# Patient Record
Sex: MALE | Race: WHITE | Employment: FULL TIME | URBAN - METROPOLITAN AREA
[De-identification: names, ages, dates, MRNs, and addresses within clinical notes are randomized per-mention and may not be internally consistent; named-entity substitution may affect disease eponyms.]

---

## 2021-10-05 ENCOUNTER — OFFICE VISIT (OUTPATIENT)
Dept: URGENT CARE | Facility: CLINIC | Age: 56
End: 2021-10-05
Payer: COMMERCIAL

## 2021-10-05 ENCOUNTER — APPOINTMENT (OUTPATIENT)
Dept: RADIOLOGY | Facility: CLINIC | Age: 56
End: 2021-10-05
Payer: COMMERCIAL

## 2021-10-05 VITALS
HEIGHT: 72 IN | HEART RATE: 96 BPM | BODY MASS INDEX: 31.02 KG/M2 | TEMPERATURE: 99.7 F | OXYGEN SATURATION: 98 % | WEIGHT: 229 LBS | DIASTOLIC BLOOD PRESSURE: 92 MMHG | RESPIRATION RATE: 22 BRPM | SYSTOLIC BLOOD PRESSURE: 165 MMHG

## 2021-10-05 DIAGNOSIS — J18.9 WALKING PNEUMONIA: Primary | ICD-10-CM

## 2021-10-05 DIAGNOSIS — R05.9 COUGH: ICD-10-CM

## 2021-10-05 PROCEDURE — 99213 OFFICE O/P EST LOW 20 MIN: CPT | Performed by: PHYSICIAN ASSISTANT

## 2021-10-05 PROCEDURE — U0003 INFECTIOUS AGENT DETECTION BY NUCLEIC ACID (DNA OR RNA); SEVERE ACUTE RESPIRATORY SYNDROME CORONAVIRUS 2 (SARS-COV-2) (CORONAVIRUS DISEASE [COVID-19]), AMPLIFIED PROBE TECHNIQUE, MAKING USE OF HIGH THROUGHPUT TECHNOLOGIES AS DESCRIBED BY CMS-2020-01-R: HCPCS | Performed by: PHYSICIAN ASSISTANT

## 2021-10-05 PROCEDURE — 71046 X-RAY EXAM CHEST 2 VIEWS: CPT

## 2021-10-05 PROCEDURE — U0005 INFEC AGEN DETEC AMPLI PROBE: HCPCS | Performed by: PHYSICIAN ASSISTANT

## 2021-10-05 RX ORDER — ATORVASTATIN CALCIUM 40 MG/1
40 TABLET, FILM COATED ORAL DAILY
COMMUNITY

## 2021-10-05 RX ORDER — LISINOPRIL 20 MG/1
20 TABLET ORAL DAILY
COMMUNITY

## 2021-10-05 RX ORDER — DOXYCYCLINE 100 MG/1
100 TABLET ORAL 2 TIMES DAILY
Qty: 14 TABLET | Refills: 0 | Status: SHIPPED | OUTPATIENT
Start: 2021-10-05 | End: 2021-10-12

## 2021-10-05 RX ORDER — OMEPRAZOLE 20 MG/1
20 CAPSULE, DELAYED RELEASE ORAL DAILY
COMMUNITY

## 2021-10-06 LAB — SARS-COV-2 RNA RESP QL NAA+PROBE: NEGATIVE

## 2024-01-14 ENCOUNTER — OFFICE VISIT (OUTPATIENT)
Dept: URGENT CARE | Facility: CLINIC | Age: 59
End: 2024-01-14
Payer: COMMERCIAL

## 2024-01-14 VITALS
DIASTOLIC BLOOD PRESSURE: 84 MMHG | SYSTOLIC BLOOD PRESSURE: 126 MMHG | TEMPERATURE: 97.9 F | RESPIRATION RATE: 18 BRPM | OXYGEN SATURATION: 98 % | HEART RATE: 64 BPM

## 2024-01-14 DIAGNOSIS — R05.1 ACUTE COUGH: ICD-10-CM

## 2024-01-14 DIAGNOSIS — B34.9 VIRAL ILLNESS: Primary | ICD-10-CM

## 2024-01-14 PROBLEM — E11.40 DIABETIC NEUROPATHY ASSOCIATED WITH TYPE 2 DIABETES MELLITUS (HCC): Status: ACTIVE | Noted: 2023-10-15

## 2024-01-14 PROBLEM — K21.9 GASTROESOPHAGEAL REFLUX DISEASE WITHOUT ESOPHAGITIS: Status: ACTIVE | Noted: 2023-10-15

## 2024-01-14 PROBLEM — E11.65 TYPE 2 DIABETES MELLITUS WITH HYPERGLYCEMIA (HCC): Status: ACTIVE | Noted: 2023-10-15

## 2024-01-14 PROBLEM — I10 ESSENTIAL HYPERTENSION: Status: ACTIVE | Noted: 2023-10-15

## 2024-01-14 PROBLEM — I35.0 AORTIC VALVE STENOSIS: Status: ACTIVE | Noted: 2023-10-15

## 2024-01-14 PROBLEM — J96.01 ACUTE RESPIRATORY FAILURE WITH HYPOXIA (HCC): Status: ACTIVE | Noted: 2023-10-15

## 2024-01-14 PROBLEM — N17.9 AKI (ACUTE KIDNEY INJURY) (HCC): Status: ACTIVE | Noted: 2023-10-15

## 2024-01-14 PROBLEM — R79.89 ELEVATED TROPONIN: Status: ACTIVE | Noted: 2020-01-15

## 2024-01-14 PROBLEM — I25.10 CAD (CORONARY ARTERY DISEASE): Status: ACTIVE | Noted: 2023-10-15

## 2024-01-14 PROBLEM — I50.9 ACUTE EXACERBATION OF CHF (CONGESTIVE HEART FAILURE) (HCC): Status: ACTIVE | Noted: 2023-10-15

## 2024-01-14 PROCEDURE — G0382 LEV 3 HOSP TYPE B ED VISIT: HCPCS

## 2024-01-14 PROCEDURE — 87636 SARSCOV2 & INF A&B AMP PRB: CPT

## 2024-01-14 PROCEDURE — 99283 EMERGENCY DEPT VISIT LOW MDM: CPT

## 2024-01-14 RX ORDER — ASPIRIN 81 MG/1
81 TABLET ORAL DAILY
COMMUNITY

## 2024-01-14 RX ORDER — CLOPIDOGREL BISULFATE 75 MG/1
TABLET ORAL
COMMUNITY
Start: 2023-10-26

## 2024-01-14 RX ORDER — AMLODIPINE BESYLATE 10 MG/1
10 TABLET ORAL DAILY
COMMUNITY
Start: 2023-09-01

## 2024-01-14 NOTE — PROGRESS NOTES
[facility]    NAME: Zain Gayle is a 58 y.o. male  : 1965    MRN: [id number]  DATE: 2024  TIME: 9:45 AM    Assessment and Plan   Viral illness [B34.9]  1. Viral illness        2. Acute cough  Cov/Flu - Lab Collect        Symptoms consistent with viral illness. Swabbed for COVID/Flu. Noted medical history -- pt outside of 48 hour range for considering Tamiflu.   Given education on supportive measures for symptoms in discharge instructions.  Follow up with primary care in 3-5 days.  Go to ER if symptoms get worse.     Patient Instructions     --Rest, drink plenty of fluids     --For nasal/sinus congestion, oral pills do not help symptoms. If you do not have hypertension/cardiac conditions -- take nasal sprays such as Afrin (for 3 days use only) or Sudafed (buy at the pharmacy counter). You can also try Flonase, steroid nasal sprays to help.     --For cough, you can take an OTC expectorant such as plain Robitussion or Mucinex. A spoonful of honey at bedtime may also be helpful.    --For sore throat, you can take OTC lozenges, use warm gargles (salt water or apple cider vinegar and honey), herbal teas, or an OTC throat spray (Chloraseptic).    --You can take Tylenol or Motrin/Advil as needed for fever, headache, body aches. Do not take Motrin/Advil if you have a history of heart disease, bleeding ulcers, or if you take blood thinners.     -For cold symptoms with high blood pressure take Coricidin cough/cold.     --You should contact your primary care provider and/or go to the ER if your symptoms are not improved or get worse over the next 7 days. This includes new onset fever, localized ear pain, sinus pain, as well as worsening cough, chest pain, shortness of breath, or significant weakness/fatigue.      Chief Complaint     Chief Complaint   Patient presents with    Flu Symptoms     Pt c/o fever, chills, body aches, cough with head and chest congestion that started 2 days ago          History of Present Illness       Presents with sick symptoms including fever, chills, body aches, cough and head/chest congestion that began 2 days ago. Known sick contacts. He has not taken medications for symptoms. Recently started job and would need a doctors note.         Review of Systems   Review of Systems   Constitutional:  Positive for chills and fever.   HENT:  Positive for congestion. Negative for ear pain and sore throat.    Respiratory:  Positive for cough. Negative for shortness of breath.    Cardiovascular:  Negative for chest pain and palpitations.   Gastrointestinal:  Negative for diarrhea, nausea and vomiting.   Musculoskeletal:  Positive for myalgias.   Skin:  Negative for color change and rash.   Neurological:  Positive for light-headedness. Negative for syncope.   Psychiatric/Behavioral:  Negative for confusion.    All other systems reviewed and are negative.        Current Medications       Current Outpatient Medications:     amLODIPine (NORVASC) 10 mg tablet, Take 10 mg by mouth daily, Disp: , Rfl:     aspirin (ECOTRIN LOW STRENGTH) 81 mg EC tablet, Take 81 mg by mouth daily, Disp: , Rfl:     atorvastatin (LIPITOR) 40 mg tablet, Take 40 mg by mouth daily, Disp: , Rfl:     clopidogrel (PLAVIX) 75 mg tablet, , Disp: , Rfl:     omeprazole (PriLOSEC) 20 mg delayed release capsule, Take 20 mg by mouth daily, Disp: , Rfl:     lisinopril (ZESTRIL) 20 mg tablet, Take 20 mg by mouth daily (Patient not taking: Reported on 1/14/2024), Disp: , Rfl:     pseudoephedrine-acetaminophen (TYLENOL SINUS)  MG TABS, Take 1 tablet by mouth every 4 (four) hours as needed for congestion, Disp: , Rfl:     Current Allergies     Allergies as of 01/14/2024    (No Known Allergies)            The following portions of the patient's history were reviewed and updated as appropriate: allergies, current medications, past family history, past medical history, past social history, past surgical history and problem  list.     Past Medical History:   Diagnosis Date    Allergic     Arthritis     Diabetes mellitus (HCC)     Hypertension     MRSA exposure        Past Surgical History:   Procedure Laterality Date    CARPAL TUNNEL RELEASE      KNEE SURGERY      TOE AMPUTATION         Family History   Problem Relation Age of Onset    Heart disease Mother     Heart disease Father     Cancer Father          Medications have been verified.        Objective   /84   Pulse 64   Temp 97.9 °F (36.6 °C) (Oral)   Resp 18   SpO2 98%        Physical Exam     Physical Exam  Vitals reviewed.   Constitutional:       General: He is not in acute distress.     Appearance: Normal appearance.   HENT:      Right Ear: Tympanic membrane, ear canal and external ear normal.      Left Ear: Tympanic membrane, ear canal and external ear normal.      Nose: Nose normal.      Mouth/Throat:      Mouth: Mucous membranes are moist.      Pharynx: No posterior oropharyngeal erythema.   Eyes:      Conjunctiva/sclera: Conjunctivae normal.   Cardiovascular:      Rate and Rhythm: Normal rate and regular rhythm.      Pulses: Normal pulses.      Heart sounds: Normal heart sounds. No murmur heard.  Pulmonary:      Effort: Pulmonary effort is normal. No respiratory distress.      Breath sounds: Normal breath sounds.   Skin:     General: Skin is warm and dry.   Neurological:      General: No focal deficit present.      Mental Status: He is alert and oriented to person, place, and time.   Psychiatric:         Mood and Affect: Mood normal.         Behavior: Behavior normal.

## 2024-01-14 NOTE — LETTER
January 14, 2024     Patient: Zain Gayle   YOB: 1965   Date of Visit: 1/14/2024       To Whom it May Concern:    Zain Gayle was seen in my clinic on 1/14/2024. He should be excused 1/15/24 and until fever free 24 hours.     If you have any questions or concerns, please don't hesitate to call.         Sincerely,          CHERIE Maldonado        CC: No Recipients

## 2024-01-15 LAB
FLUAV RNA RESP QL NAA+PROBE: NEGATIVE
FLUBV RNA RESP QL NAA+PROBE: NEGATIVE
SARS-COV-2 RNA RESP QL NAA+PROBE: NEGATIVE

## 2024-01-17 NOTE — RESULT ENCOUNTER NOTE
Your COVID/Flu test result was negative. Continue supportive care with over the counter medications. If any additional problems/concerns please follow up with your family doctor or return visit to care now. I hope you feel better soon!

## 2024-01-22 ENCOUNTER — OFFICE VISIT (OUTPATIENT)
Dept: URGENT CARE | Facility: CLINIC | Age: 59
End: 2024-01-22
Payer: COMMERCIAL

## 2024-01-22 VITALS
TEMPERATURE: 99.6 F | OXYGEN SATURATION: 96 % | SYSTOLIC BLOOD PRESSURE: 176 MMHG | DIASTOLIC BLOOD PRESSURE: 86 MMHG | HEART RATE: 75 BPM | RESPIRATION RATE: 22 BRPM

## 2024-01-22 DIAGNOSIS — J20.9 ACUTE BRONCHITIS, UNSPECIFIED ORGANISM: Primary | ICD-10-CM

## 2024-01-22 DIAGNOSIS — R05.1 ACUTE COUGH: ICD-10-CM

## 2024-01-22 PROCEDURE — 99283 EMERGENCY DEPT VISIT LOW MDM: CPT

## 2024-01-22 PROCEDURE — G0382 LEV 3 HOSP TYPE B ED VISIT: HCPCS

## 2024-01-22 RX ORDER — AZITHROMYCIN 250 MG/1
TABLET, FILM COATED ORAL
Qty: 6 TABLET | Refills: 0 | Status: SHIPPED | OUTPATIENT
Start: 2024-01-22 | End: 2024-01-26

## 2024-01-22 RX ORDER — BENZONATATE 200 MG/1
200 CAPSULE ORAL 3 TIMES DAILY PRN
Qty: 20 CAPSULE | Refills: 0 | Status: SHIPPED | OUTPATIENT
Start: 2024-01-22

## 2024-01-22 NOTE — PROGRESS NOTES
[facility]        NAME: Zain Gayle is a 58 y.o. male  : 1965    MRN: [id number]  DATE: 2024  TIME: 2:33 PM    Assessment and Plan   Acute bronchitis, unspecified organism [J20.9]  1. Acute bronchitis, unspecified organism  azithromycin (ZITHROMAX) 250 mg tablet      2. Acute cough  benzonatate (TESSALON) 200 MG capsule        Declined COVID/flu testing.  Work note given.     Patient Instructions     Start azithromycin.  Mucinex over-the-counter for cough and congestion.  Benzonatate as needed for cough.   Tylenol and Motrin over-the-counter for pain and fever.    Follow up with PCP.  Go to the nearest emergency department if you have difficulty breathing, worsening symptoms.     Chief Complaint     Chief Complaint   Patient presents with    Cough     Cough and chest congestion worsening since here last on .  States cough is making abdomen and ribs hurt in addition to coughing fits are making him dizzy         History of Present Illness       The patient presents today with complaints of productive cough for yellow/green sputum, chest congestion x 1 week. Yesterday he missed work due to nausea and diarrhea. He states he will cough to the point of vomiting at times. He was seen here on 23 and tested negative for COVID/flu and diagnosed with a viral URI. He denies fever/chills, SOB, wheezing. He is a former smoker. Is requesting a work note.         Review of Systems   Review of Systems   Constitutional:  Negative for chills and fever.   HENT:  Negative for congestion, ear pain, postnasal drip, rhinorrhea and sore throat.    Respiratory:  Positive for cough. Negative for shortness of breath and wheezing.    Gastrointestinal:  Positive for diarrhea, nausea and vomiting. Negative for abdominal pain.   Musculoskeletal:  Positive for myalgias (related to coughing).   Skin:  Negative for rash.   Neurological:  Positive for dizziness (when coughing).         Current Medications        Current Outpatient Medications:     azithromycin (ZITHROMAX) 250 mg tablet, Take 2 tablets today then 1 tablet daily x 4 days, Disp: 6 tablet, Rfl: 0    benzonatate (TESSALON) 200 MG capsule, Take 1 capsule (200 mg total) by mouth 3 (three) times a day as needed for cough, Disp: 20 capsule, Rfl: 0    amLODIPine (NORVASC) 10 mg tablet, Take 10 mg by mouth daily, Disp: , Rfl:     aspirin (ECOTRIN LOW STRENGTH) 81 mg EC tablet, Take 81 mg by mouth daily, Disp: , Rfl:     atorvastatin (LIPITOR) 40 mg tablet, Take 40 mg by mouth daily, Disp: , Rfl:     clopidogrel (PLAVIX) 75 mg tablet, , Disp: , Rfl:     lisinopril (ZESTRIL) 20 mg tablet, Take 20 mg by mouth daily (Patient not taking: Reported on 1/14/2024), Disp: , Rfl:     omeprazole (PriLOSEC) 20 mg delayed release capsule, Take 20 mg by mouth daily, Disp: , Rfl:     pseudoephedrine-acetaminophen (TYLENOL SINUS)  MG TABS, Take 1 tablet by mouth every 4 (four) hours as needed for congestion, Disp: , Rfl:     Current Allergies     Allergies as of 01/22/2024    (No Known Allergies)            The following portions of the patient's history were reviewed and updated as appropriate: allergies, current medications, past family history, past medical history, past social history, past surgical history and problem list.     Past Medical History:   Diagnosis Date    Allergic     Arthritis     Diabetes mellitus (HCC)     Hypertension     MRSA exposure        Past Surgical History:   Procedure Laterality Date    CARPAL TUNNEL RELEASE      KNEE SURGERY      TOE AMPUTATION         Family History   Problem Relation Age of Onset    Heart disease Mother     Heart disease Father     Cancer Father          Medications have been verified.        Objective   BP (!) 176/86   Pulse 75   Temp 99.6 °F (37.6 °C)   Resp 22   SpO2 96%        Physical Exam     Physical Exam  Vitals and nursing note reviewed.   Constitutional:       General: He is not in acute distress.      Appearance: Normal appearance. He is not ill-appearing.   HENT:      Head: Normocephalic and atraumatic.      Right Ear: Tympanic membrane, ear canal and external ear normal.      Left Ear: Tympanic membrane, ear canal and external ear normal.      Nose: Nose normal. No congestion or rhinorrhea.      Mouth/Throat:      Lips: Pink.      Mouth: Mucous membranes are moist.      Pharynx: No oropharyngeal exudate or posterior oropharyngeal erythema.      Tonsils: No tonsillar exudate.   Eyes:      General: Vision grossly intact.      Extraocular Movements: Extraocular movements intact.      Pupils: Pupils are equal, round, and reactive to light.   Cardiovascular:      Rate and Rhythm: Normal rate and regular rhythm.      Heart sounds: Normal heart sounds. No murmur heard.  Pulmonary:      Effort: Pulmonary effort is normal. No respiratory distress.      Breath sounds: Normal breath sounds. No decreased air movement. No decreased breath sounds, wheezing, rhonchi or rales.   Musculoskeletal:         General: Normal range of motion.      Cervical back: Normal range of motion.   Skin:     General: Skin is warm.      Findings: No rash.   Neurological:      Mental Status: He is alert and oriented to person, place, and time.      Motor: Motor function is intact.      Gait: Gait is intact.   Psychiatric:         Attention and Perception: Attention normal.         Mood and Affect: Mood normal.

## 2024-01-22 NOTE — PATIENT INSTRUCTIONS
Start azithromycin.  Mucinex over-the-counter for cough and congestion.  Benzonatate as needed for cough.   Tylenol and Motrin over-the-counter for pain and fever.    Follow up with PCP.  Go to the nearest emergency department if you have difficulty breathing, worsening symptoms.         Acute Bronchitis   WHAT YOU NEED TO KNOW:   Acute bronchitis is swelling and irritation in your lungs. It is usually caused by a virus and most often happens in the winter. Bronchitis may also be caused by bacteria or by a chemical irritant, such as smoke.  DISCHARGE INSTRUCTIONS:   Return to the emergency department if:   You cough up blood.     Your lips or fingernails turn blue.     You feel like you are not getting enough air when you breathe.     Call your doctor if:   Your symptoms do not go away or get worse, even after treatment.     Your cough does not get better within 4 weeks.     You have questions or concerns about your condition or care.     Medicines:  You may  need any of the following:  Cough suppressants  decrease your urge to cough.      Decongestants  help loosen mucus in your lungs and make it easier to cough up. This can help you breathe easier.     Inhalers  may be given. Your healthcare provider may give you one or more inhalers to help you breathe easier and cough less. An inhaler gives your medicine to open your airways. Ask your healthcare provider to show you how to use your inhaler correctly.          Antibiotics  may be given for up to 5 days if your bronchitis is caused by bacteria.     Acetaminophen  decreases pain and fever. It is available without a doctor's order. Ask how much to take and how often to take it. Follow directions. Read the labels of all other medicines you are using to see if they also contain acetaminophen, or ask your doctor or pharmacist. Acetaminophen can cause liver damage if not taken correctly. Do not use more than 4 grams (4,000 milligrams) total of acetaminophen in one day.       NSAIDs  help decrease swelling and pain or fever. This medicine is available with or without a doctor's order. NSAIDs can cause stomach bleeding or kidney problems in certain people. If you take blood thinner medicine, always ask your healthcare provider if NSAIDs are safe for you. Always read the medicine label and follow directions.     Take your medicine as directed.  Contact your healthcare provider if you think your medicine is not helping or if you have side effects. Tell him of her if you are allergic to any medicine. Keep a list of the medicines, vitamins, and herbs you take. Include the amounts, and when and why you take them. Bring the list or the pill bottles to follow-up visits. Carry your medicine list with you in case of an emergency.     Self-care:   Drink liquids as directed.  You may need to drink more liquids than usual to stay hydrated. Ask how much liquid to drink each day and which liquids are best for you.     Use a cool mist humidifier  to increase air moisture in your home. This may make it easier for you to breathe and help decrease your cough.      Get more rest.  Rest helps your body to heal. Slowly start to do more each day. Rest when you feel it is needed.     Avoid irritants in the air.  Avoid chemicals, fumes, and dust. Wear a face mask if you must work around dust or fumes. Stay inside on days when air pollution levels are high. If you have allergies, stay inside when pollen counts are high. Do not use aerosol products, such as spray-on deodorant, bug spray, and hair spray.     Do not smoke or be around others who are smoking.  Nicotine and other chemicals in cigarettes and cigars can cause lung damage. Ask your healthcare provider for information if you currently smoke and need help to quit. E-cigarettes or smokeless tobacco still contain nicotine. Talk to your healthcare provider before you use these products.     Prevent acute bronchitis:       Ask about vaccines you may need.   Get a flu vaccine each year as soon as recommended, usually in September or October. Ask your healthcare provider if you should also get a pneumonia or COVID-19 vaccine. Your healthcare provider can tell you if you should also get other vaccines, and when to get them.     Prevent the spread of germs.  You can decrease your risk for acute bronchitis and other illnesses by doing the following:      Wash your hands often with soap and water. Carry germ-killing hand lotion or gel with you. You can use the lotion or gel to clean your hands when soap and water are not available.          Do not touch your eyes, nose, or mouth unless you have washed your hands first.     Always cover your mouth when you cough to prevent the spread of germs. It is best to cough into a tissue or your shirt sleeve instead of into your hand. Ask those around you to cover their mouths when they cough.     Try to avoid people who have a cold or the flu. If you are sick, stay away from others as much as possible.     Follow up with your doctor as directed:  Write down questions you have so you will remember to ask them during your follow-up visits.  © Copyright [employer] 2021 Information is for End User's use only and may not be sold, redistributed or otherwise used for commercial purposes. All illustrations and images included in CareNotes® are the copyrighted property of [employer] or [employer]  The above information is an  only. It is not intended as medical advice for individual conditions or treatments. Talk to your doctor, nurse or pharmacist before following any medical regimen to see if it is safe and effective for you.

## 2024-01-22 NOTE — LETTER
January 22, 2024     Patient: Zain Gayle   YOB: 1965   Date of Visit: 1/22/2024       To Whom it May Concern:    Zain Gayle was seen in my clinic on 1/22/2024. He may return to work on 1/25/2024 .    If you have any questions or concerns, please don't hesitate to call.         Sincerely,          CHERIE Joseph        CC: No Recipients

## 2024-08-20 ENCOUNTER — APPOINTMENT (EMERGENCY)
Dept: RADIOLOGY | Facility: HOSPITAL | Age: 59
End: 2024-08-20
Payer: COMMERCIAL

## 2024-08-20 ENCOUNTER — APPOINTMENT (EMERGENCY)
Dept: CT IMAGING | Facility: HOSPITAL | Age: 59
End: 2024-08-20
Payer: COMMERCIAL

## 2024-08-20 ENCOUNTER — HOSPITAL ENCOUNTER (EMERGENCY)
Facility: HOSPITAL | Age: 59
Discharge: HOME | End: 2024-08-20
Attending: EMERGENCY MEDICINE
Payer: COMMERCIAL

## 2024-08-20 VITALS
SYSTOLIC BLOOD PRESSURE: 105 MMHG | DIASTOLIC BLOOD PRESSURE: 61 MMHG | OXYGEN SATURATION: 98 % | HEART RATE: 62 BPM | RESPIRATION RATE: 18 BRPM | TEMPERATURE: 98.2 F

## 2024-08-20 DIAGNOSIS — R00.2 PALPITATIONS: Primary | ICD-10-CM

## 2024-08-20 DIAGNOSIS — R06.02 SOB (SHORTNESS OF BREATH): ICD-10-CM

## 2024-08-20 LAB
2HR DELTA HS TROPONIN: -4 NG/L
ANION GAP SERPL CALCULATED.3IONS-SCNC: 7 MMOL/L (ref 4–13)
ATRIAL RATE: 74 BPM
BASOPHILS # BLD AUTO: 0.07 THOUSANDS/ÂΜL (ref 0–0.1)
BASOPHILS NFR BLD AUTO: 1 % (ref 0–1)
BNP SERPL-MCNC: 1013 PG/ML (ref 0–100)
BUN SERPL-MCNC: 18 MG/DL (ref 5–25)
CALCIUM SERPL-MCNC: 9.3 MG/DL (ref 8.4–10.2)
CARDIAC TROPONIN I PNL SERPL HS: 41 NG/L
CARDIAC TROPONIN I PNL SERPL HS: 45 NG/L
CHLORIDE SERPL-SCNC: 100 MMOL/L (ref 96–108)
CO2 SERPL-SCNC: 28 MMOL/L (ref 21–32)
CREAT SERPL-MCNC: 0.99 MG/DL (ref 0.6–1.3)
D DIMER PPP FEU-MCNC: 1.01 UG/ML FEU
EOSINOPHIL # BLD AUTO: 0.18 THOUSAND/ÂΜL (ref 0–0.61)
EOSINOPHIL NFR BLD AUTO: 2 % (ref 0–6)
ERYTHROCYTE [DISTWIDTH] IN BLOOD BY AUTOMATED COUNT: 13.4 % (ref 11.6–15.1)
FLUAV RNA RESP QL NAA+PROBE: NEGATIVE
FLUBV RNA RESP QL NAA+PROBE: NEGATIVE
GFR SERPL CREATININE-BSD FRML MDRD: 84 ML/MIN/1.73SQ M
GLUCOSE SERPL-MCNC: 260 MG/DL (ref 65–140)
HCT VFR BLD AUTO: 44.5 % (ref 36.5–49.3)
HGB BLD-MCNC: 14.1 G/DL (ref 12–17)
IMM GRANULOCYTES # BLD AUTO: 0.12 THOUSAND/UL (ref 0–0.2)
IMM GRANULOCYTES NFR BLD AUTO: 1 % (ref 0–2)
LYMPHOCYTES # BLD AUTO: 1.25 THOUSANDS/ÂΜL (ref 0.6–4.47)
LYMPHOCYTES NFR BLD AUTO: 11 % (ref 14–44)
MCH RBC QN AUTO: 27.1 PG (ref 26.8–34.3)
MCHC RBC AUTO-ENTMCNC: 31.7 G/DL (ref 31.4–37.4)
MCV RBC AUTO: 85 FL (ref 82–98)
MONOCYTES # BLD AUTO: 0.39 THOUSAND/ÂΜL (ref 0.17–1.22)
MONOCYTES NFR BLD AUTO: 4 % (ref 4–12)
NEUTROPHILS # BLD AUTO: 8.95 THOUSANDS/ÂΜL (ref 1.85–7.62)
NEUTS SEG NFR BLD AUTO: 81 % (ref 43–75)
NRBC BLD AUTO-RTO: 0 /100 WBCS
P AXIS: 54 DEGREES
PLATELET # BLD AUTO: 370 THOUSANDS/UL (ref 149–390)
PMV BLD AUTO: 10.1 FL (ref 8.9–12.7)
POTASSIUM SERPL-SCNC: 4.5 MMOL/L (ref 3.5–5.3)
PR INTERVAL: 168 MS
QRS AXIS: 47 DEGREES
QRSD INTERVAL: 104 MS
QT INTERVAL: 414 MS
QTC INTERVAL: 459 MS
RBC # BLD AUTO: 5.21 MILLION/UL (ref 3.88–5.62)
RSV RNA RESP QL NAA+PROBE: NEGATIVE
SARS-COV-2 RNA RESP QL NAA+PROBE: NEGATIVE
SODIUM SERPL-SCNC: 135 MMOL/L (ref 135–147)
T WAVE AXIS: 15 DEGREES
TSH SERPL DL<=0.05 MIU/L-ACNC: 2.01 UIU/ML (ref 0.45–4.5)
VENTRICULAR RATE: 74 BPM
WBC # BLD AUTO: 10.96 THOUSAND/UL (ref 4.31–10.16)

## 2024-08-20 PROCEDURE — 99285 EMERGENCY DEPT VISIT HI MDM: CPT

## 2024-08-20 PROCEDURE — 0241U HB NFCT DS VIR RESP RNA 4 TRGT: CPT

## 2024-08-20 PROCEDURE — 71046 X-RAY EXAM CHEST 2 VIEWS: CPT

## 2024-08-20 PROCEDURE — 93010 ELECTROCARDIOGRAM REPORT: CPT | Performed by: INTERNAL MEDICINE

## 2024-08-20 PROCEDURE — 84484 ASSAY OF TROPONIN QUANT: CPT

## 2024-08-20 PROCEDURE — 85379 FIBRIN DEGRADATION QUANT: CPT

## 2024-08-20 PROCEDURE — 83880 ASSAY OF NATRIURETIC PEPTIDE: CPT

## 2024-08-20 PROCEDURE — 84443 ASSAY THYROID STIM HORMONE: CPT

## 2024-08-20 PROCEDURE — 71275 CT ANGIOGRAPHY CHEST: CPT

## 2024-08-20 PROCEDURE — 36415 COLL VENOUS BLD VENIPUNCTURE: CPT

## 2024-08-20 PROCEDURE — 85025 COMPLETE CBC W/AUTO DIFF WBC: CPT

## 2024-08-20 PROCEDURE — 80048 BASIC METABOLIC PNL TOTAL CA: CPT

## 2024-08-20 PROCEDURE — 93005 ELECTROCARDIOGRAM TRACING: CPT

## 2024-08-20 RX ORDER — DULAGLUTIDE 0.75 MG/.5ML
0.75 INJECTION, SOLUTION SUBCUTANEOUS
Status: ON HOLD | COMMUNITY

## 2024-08-20 RX ORDER — METOPROLOL SUCCINATE 50 MG/1
50 TABLET, EXTENDED RELEASE ORAL DAILY
Status: ON HOLD | COMMUNITY

## 2024-08-20 RX ORDER — FUROSEMIDE 40 MG
40 TABLET ORAL DAILY
Status: ON HOLD | COMMUNITY

## 2024-08-20 RX ADMIN — IOHEXOL 85 ML: 350 INJECTION, SOLUTION INTRAVENOUS at 09:38

## 2024-08-20 NOTE — DISCHARGE INSTRUCTIONS
Follow-up with cardiology.   Follow-up with your PCP and return to the ER for any worsening symptoms.

## 2024-08-20 NOTE — ED PROVIDER NOTES
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 Hx of CHF     Patient is a 59-year-old male who presents to the emergency room for symptoms that started this morning.  Patient reports he woke up around 5:15AM and did not feel right.  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  Reports palpitations and shortness of breath with inspiration.  Denies any other symptoms.  Patient reports he is asymptomatic on initial evaluation.  No medication for symptoms.          Prior to Admission Medications   Prescriptions Last Dose Informant Patient Reported? Taking?   amLODIPine (NORVASC) 10 mg tablet 8/20/2024  Yes Yes   Sig: Take 10 mg by mouth daily   aspirin (ECOTRIN LOW STRENGTH) 81 mg EC tablet 8/20/2024  Yes Yes   Sig: Take 81 mg by mouth daily   atorvastatin (LIPITOR) 40 mg tablet 8/19/2024  Yes Yes   Sig: Take 40 mg by mouth daily   benzonatate (TESSALON) 200 MG capsule Not Taking  No No   Sig: Take 1 capsule (200 mg total) by mouth 3 (three) times a day as needed for cough   Patient not taking: Reported on 8/20/2024   clopidogrel (PLAVIX) 75 mg tablet Not Taking  Yes No   Patient not taking: Reported on 8/20/2024   dulaglutide (Trulicity) 0.75 MG/0.5ML injection Past Week  Yes Yes   Sig: Inject 0.75 mg under the skin every 7 days Pt. Takes on Fridays   furosemide (LASIX) 40 mg tablet 8/20/2024  Yes Yes   Sig: Take 40 mg by mouth daily   lisinopril (ZESTRIL) 20 mg tablet Not Taking  Yes No   Sig: Take 20 mg by mouth daily   Patient not taking: Reported on 1/14/2024   metoprolol succinate (TOPROL-XL) 50 mg 24 hr tablet 8/20/2024  Yes Yes   Sig: Take 50 mg by mouth daily   omeprazole (PriLOSEC) 20 mg delayed release capsule 8/20/2024  Yes Yes   Sig: Take 20 mg by mouth daily   pseudoephedrine-acetaminophen (TYLENOL SINUS)  MG TABS Not Taking  Yes No   Sig: Take 1 tablet by mouth  every 4 (four) hours as needed for congestion   Patient not taking: Reported on 8/20/2024      Facility-Administered Medications: None       Past Medical History:   Diagnosis Date    Allergic     Arthritis     Diabetes mellitus (HCC)     Hypertension     MRSA exposure        Past Surgical History:   Procedure Laterality Date    CARPAL TUNNEL RELEASE      KNEE SURGERY      TOE AMPUTATION         Family History   Problem Relation Age of Onset    Heart disease Mother     Heart disease Father     Cancer Father      I have reviewed and agree with the history as documented.    E-Cigarette/Vaping    E-Cigarette Use Never User      E-Cigarette/Vaping Substances     Social History     Tobacco Use    Smoking status: Former    Smokeless tobacco: Never   Vaping Use    Vaping status: Never Used       Review of Systems   Constitutional:  Negative for chills and fever.   HENT:  Negative for ear pain, sore throat, trouble swallowing and voice change.    Eyes:  Negative for pain and visual disturbance.   Respiratory:  Positive for shortness of breath. Negative for cough.    Cardiovascular:  Positive for palpitations. Negative for chest pain.   Gastrointestinal:  Negative for abdominal pain, nausea and vomiting.   Genitourinary:  Negative for dysuria and hematuria.   Musculoskeletal:  Negative for arthralgias and back pain.   Skin:  Negative for color change and rash.   Neurological:  Negative for seizures and syncope.   Psychiatric/Behavioral:  Negative for confusion.    All other systems reviewed and are negative.      Physical Exam  Physical Exam  Vitals and nursing note reviewed.   Constitutional:       General: He is not in acute distress.     Appearance: Normal appearance. He is well-developed.   HENT:      Head: Normocephalic and atraumatic.   Eyes:      Conjunctiva/sclera: Conjunctivae normal.   Cardiovascular:      Rate and Rhythm: Normal rate and regular rhythm.      Pulses: Normal pulses.   Pulmonary:      Effort:  Pulmonary effort is normal. No respiratory distress.      Breath sounds: Normal breath sounds.   Abdominal:      Palpations: Abdomen is soft.      Tenderness: There is no abdominal tenderness.   Musculoskeletal:         General: No swelling.      Cervical back: Neck supple.   Skin:     General: Skin is warm and dry.      Capillary Refill: Capillary refill takes less than 2 seconds.   Neurological:      Mental Status: He is alert and oriented to person, place, and time.   Psychiatric:         Mood and Affect: Mood normal.         Vital Signs  ED Triage Vitals   Temperature Pulse Respirations Blood Pressure SpO2   08/20/24 0740 08/20/24 0740 08/20/24 0740 08/20/24 0740 08/20/24 0740   98.2 °F (36.8 °C) 72 18 157/89 94 %      Temp src Heart Rate Source Patient Position - Orthostatic VS BP Location FiO2 (%)   -- 08/20/24 0740 08/20/24 1153 08/20/24 1153 --    Monitor Sitting Right arm       Pain Score       08/20/24 1153       No Pain           Vitals:    08/20/24 0740 08/20/24 1153   BP: 157/89 105/61   Pulse: 72 62   Patient Position - Orthostatic VS:  Sitting         Visual Acuity      ED Medications  Medications   iohexol (OMNIPAQUE) 350 MG/ML injection (MULTI-DOSE) 85 mL (85 mL Intravenous Given 8/20/24 0938)       Diagnostic Studies  Results Reviewed       Procedure Component Value Units Date/Time    HS Troponin I 2hr [[id number]]  (Normal) Collected: 08/20/24 1151    Lab Status: Final result Specimen: Blood from Arm, Left Updated: 08/20/24 1233     hs TnI 2hr 41 ng/L      Delta 2hr hsTnI -4 ng/L     FLU/RSV/COVID - if FLU/RSV clinically relevant [[id number]]  (Normal) Collected: 08/20/24 0841    Lab Status: Final result Specimen: Nares from Nose Updated: 08/20/24 0939     SARS-CoV-2 Negative     INFLUENZA A PCR Negative     INFLUENZA B PCR Negative     RSV PCR Negative    Narrative:      This test has been performed using the CoV-2/Flu/RSV plus assay on the "[employer]" GeneXpert platform. This test has been validated  by the  and verified by the performing laboratory.     This test is designed to amplify and detect the following: nucleocapsid (N), envelope (E), and RNA-dependent RNA polymerase (RdRP) genes of the SARS-CoV-2 genome; matrix (M), basic polymerase (PB2), and acidic protein (PA) segments of the influenza A genome; matrix (M) and non-structural protein (NS) segments of the influenza B genome, and the nucleocapsid genes of RSV A and RSV B.     Positive results are indicative of the presence of Flu A, Flu B, RSV, and/or SARS-CoV-2 RNA. Positive results for SARS-CoV-2 or suspected novel influenza should be reported to state, local, or federal health departments according to local reporting requirements.      All results should be assessed in conjunction with clinical presentation and other laboratory markers for clinical management.     FOR PEDIATRIC PATIENTS - copy/paste COVID Guidelines URL to browser: [email or web address]       TSH, 3rd generation with Free T4 reflex [[id number]]  (Normal) Collected: 08/20/24 0841    Lab Status: Final result Specimen: Blood from Arm, Left Updated: 08/20/24 0927     TSH 3RD GENERATON 2.006 uIU/mL     D-Dimer [[id number]]  (Abnormal) Collected: 08/20/24 0841    Lab Status: Final result Specimen: Blood from Arm, Left Updated: 08/20/24 0917     D-Dimer, Quant 1.01 ug/ml FEU     Narrative:      In the evaluation for possible pulmonary embolism, in the appropriate (Well's Score of 4 or less) patient, the age adjusted d-dimer cutoff for this patient can be calculated as:    Age x 0.01 (in ug/mL) for Age-adjusted D-dimer exclusion threshold for a patient over 50 years.    B-Type Natriuretic Peptide(BNP) [[id number]]  (Abnormal) Collected: 08/20/24 0841    Lab Status: Final result Specimen: Blood from Arm, Left Updated: 08/20/24 0916     BNP 1,013 pg/mL     HS Troponin 0hr (reflex protocol) [[id number]]  (Normal) Collected: 08/20/24  0841    Lab Status: Final result Specimen: Blood from Arm, Left Updated: 08/20/24 0915     hs TnI 0hr 45 ng/L     Basic metabolic panel [[id number]]  (Abnormal) Collected: 08/20/24 0841    Lab Status: Final result Specimen: Blood from Arm, Left Updated: 08/20/24 0908     Sodium 135 mmol/L      Potassium 4.5 mmol/L      Chloride 100 mmol/L      CO2 28 mmol/L      ANION GAP 7 mmol/L      BUN 18 mg/dL      Creatinine 0.99 mg/dL      Glucose 260 mg/dL      Calcium 9.3 mg/dL      eGFR 84 ml/min/1.73sq m     Narrative:      National Kidney Disease Foundation guidelines for Chronic Kidney Disease (CKD):     Stage 1 with normal or high GFR (GFR > 90 mL/min/1.73 square meters)    Stage 2 Mild CKD (GFR = 60-89 mL/min/1.73 square meters)    Stage 3A Moderate CKD (GFR = 45-59 mL/min/1.73 square meters)    Stage 3B Moderate CKD (GFR = 30-44 mL/min/1.73 square meters)    Stage 4 Severe CKD (GFR = 15-29 mL/min/1.73 square meters)    Stage 5 End Stage CKD (GFR <15 mL/min/1.73 square meters)  Note: GFR calculation is accurate only with a steady state creatinine    CBC and differential [[id number]]  (Abnormal) Collected: 08/20/24 0841    Lab Status: Final result Specimen: Blood from Arm, Left Updated: 08/20/24 0852     WBC 10.96 Thousand/uL      RBC 5.21 Million/uL      Hemoglobin 14.1 g/dL      Hematocrit 44.5 %      MCV 85 fL      MCH 27.1 pg      MCHC 31.7 g/dL      RDW 13.4 %      MPV 10.1 fL      Platelets 370 Thousands/uL      nRBC 0 /100 WBCs      Segmented % 81 %      Immature Grans % 1 %      Lymphocytes % 11 %      Monocytes % 4 %      Eosinophils Relative 2 %      Basophils Relative 1 %      Absolute Neutrophils 8.95 Thousands/µL      Absolute Immature Grans 0.12 Thousand/uL      Absolute Lymphocytes 1.25 Thousands/µL      Absolute Monocytes 0.39 Thousand/µL      Eosinophils Absolute 0.18 Thousand/µL      Basophils Absolute 0.07 Thousands/µL                    CTA ED chest PE Study   Final Result by Angie Quintanilla MD (08/20  0953)      No evidence for pulmonary embolism.                  Workstation performed: [id number]         XR chest 2 views   ED Interpretation by Nia Moreno PA-C (08/20 1001)   No acute cardiopulmonary abnormality                 Procedures  ECG 12 Lead Documentation Only    Date/Time: 8/20/2024 7:44 AM    Performed by: Nia Moreno PA-C  Authorized by: Nia Moreno PA-C    Indications / Diagnosis:  Cardiac work-up  ECG reviewed by me, the ED Provider: yes    Patient location:  ED  Interpretation:     Interpretation: normal    Rate:     ECG rate:  74    ECG rate assessment: normal    Rhythm:     Rhythm: sinus rhythm    ST segments:     ST segments:  Normal  T waves:     T waves: normal             ED Course  ED Course as of 08/20/24 1433   Tue Aug 20, 2024   1001 CTA ED chest PE Study  IMPRESSION:     No evidence for pulmonary embolism.                 HEART Risk Score      Flowsheet Row Most Recent Value   Heart Score Risk Calculator    History 1 Filed at: 08/20/2024 1253   ECG 0 Filed at: 08/20/2024 1253   Age 1 Filed at: 08/20/2024 1253   Risk Factors 2 Filed at: 08/20/2024 1253   Troponin 2 Filed at: 08/20/2024 1253   HEART Score 6 Filed at: 08/20/2024 1253                          SBIRT 22yo+      Flowsheet Row Most Recent Value   Initial Alcohol Screen: US AUDIT-C     1. How often do you have a drink containing alcohol? 1 Filed at: 08/20/2024 0742   2. How many drinks containing alcohol do you have on a typical day you are drinking?  1 Filed at: 08/20/2024 0742   3a. Male UNDER 65: How often do you have five or more drinks on one occasion? 1 Filed at: 08/20/2024 0742   Audit-C Score 3 Filed at: 08/20/2024 0742   AJAY: How many times in the past year have you...    Used an illegal drug or used a prescription medication for non-medical reasons? Never Filed at: 08/20/2024 0742            Naveen' Criteria for PE      Flowsheet Row Most Recent Value   Wells' Criteria for PE    Clinical signs and  symptoms of DVT 0 Filed at: 08/20/2024 0922   PE is primary diagnosis or equally likely 3 Filed at: 08/20/2024 0922   HR >100 0 Filed at: 08/20/2024 0922   Immobilization at least 3 days or Surgery in the previous 4 weeks 0 Filed at: 08/20/2024 0922   Previous, objectively diagnosed PE or DVT 0 Filed at: 08/20/2024 0922   Hemoptysis 0 Filed at: 08/20/2024 0922   Malignancy with treatment within 6 months or palliative 0 Filed at: 08/20/2024 0922   Wells' Criteria Total 3 Filed at: 08/20/2024 0922                  Medical Decision Making  59-year-old male presenting for palpitations and SOB with inspiration x1 day.  Asymptomatic on initial evaluation and throughout ED course.  Vital signs stable throughout ED course.  No acute physical exam findings.  Elevated D-dimer, CT PE study with no evidence of PE.  Otherwise, labs stable.  Negative cardiac workup.  Troponin stable.  Provided patient education and discussed return precautions.  Patient to follow-up with cardiology, referral placed.  Patient to follow-up with his PCP and return to the ER for any worsening symptoms.  Patient understands and agrees with discharge plan.    Amount and/or Complexity of Data Reviewed  Labs: ordered.  Radiology: ordered and independent interpretation performed. Decision-making details documented in ED Course.    Risk  Prescription drug management.                 Disposition  Final diagnoses:   Palpitations   SOB (shortness of breath)     Time reflects when diagnosis was documented in both MDM as applicable and the Disposition within this note       Time User Action Codes Description Comment    8/20/2024 12:56 PM Nia Moreno Add [R00.2] Palpitations     8/20/2024 12:56 PM Nia Moreno Add [R06.02] SOB (shortness of breath)           ED Disposition       ED Disposition   Discharge    Condition   Stable    Date/Time   Tue Aug 20, 2024 1256    Comment   Zain Gayle discharge to home/self care.                   Follow-up Information        Follow up With Specialties Details Why Contact Info Additional Information    Your PCP         [facility] Emergency Medicine Go to  If symptoms worsen [address] [facility] [address] [phone] [facility], [address], [place], [address]    [facility]   [address]  [address]  [facility] [address] [phone] [facility], [address], [address], [place], [place], [id number] [phone]            Discharge Medication List as of 8/20/2024 12:58 PM        CONTINUE these medications which have NOT CHANGED    Details   amLODIPine (NORVASC) 10 mg tablet Take 10 mg by mouth daily, Starting Fri 9/1/2023, Historical Med      aspirin (ECOTRIN LOW STRENGTH) 81 mg EC tablet Take 81 mg by mouth daily, Historical Med      atorvastatin (LIPITOR) 40 mg tablet Take 40 mg by mouth daily, Historical Med      dulaglutide (Trulicity) 0.75 MG/0.5ML injection Inject 0.75 mg under the skin every 7 days Pt. Takes on Fridays, Historical Med      furosemide (LASIX) 40 mg tablet Take 40 mg by mouth daily, Historical Med      metoprolol succinate (TOPROL-XL) 50 mg 24 hr tablet Take 50 mg by mouth daily, Historical Med      omeprazole (PriLOSEC) 20 mg delayed release capsule Take 20 mg by mouth daily, Historical Med      benzonatate (TESSALON) 200 MG capsule Take 1 capsule (200 mg total) by mouth 3 (three) times a day as needed for cough, Starting Mon 1/22/2024, Normal      clopidogrel (PLAVIX) 75 mg tablet Historical Med      lisinopril (ZESTRIL) 20 mg tablet Take 20 mg by mouth daily, Historical Med      pseudoephedrine-acetaminophen (TYLENOL SINUS)  MG TABS Take 1 tablet by mouth every 4 (four) hours as needed for congestion, Historical Med                 PDMP Review       None             ED Provider  Electronically Signed by             Nia Moreno PA-C  08/20/24 2196

## 2024-09-01 ENCOUNTER — APPOINTMENT (EMERGENCY)
Dept: CT IMAGING | Facility: HOSPITAL | Age: 59
DRG: 710 | End: 2024-09-01
Payer: COMMERCIAL

## 2024-09-01 ENCOUNTER — APPOINTMENT (INPATIENT)
Dept: RADIOLOGY | Facility: HOSPITAL | Age: 59
DRG: 710 | End: 2024-09-01
Payer: COMMERCIAL

## 2024-09-01 ENCOUNTER — HOSPITAL ENCOUNTER (INPATIENT)
Facility: HOSPITAL | Age: 59
LOS: 11 days | Discharge: HOME HEALTH | DRG: 710 | End: 2024-09-12
Attending: EMERGENCY MEDICINE | Admitting: STUDENT IN AN ORGANIZED HEALTH CARE EDUCATION/TRAINING PROGRAM
Payer: COMMERCIAL

## 2024-09-01 ENCOUNTER — APPOINTMENT (INPATIENT)
Dept: NON INVASIVE DIAGNOSTICS | Facility: HOSPITAL | Age: 59
DRG: 710 | End: 2024-09-01
Payer: COMMERCIAL

## 2024-09-01 ENCOUNTER — APPOINTMENT (EMERGENCY)
Dept: RADIOLOGY | Facility: HOSPITAL | Age: 59
DRG: 710 | End: 2024-09-01
Payer: COMMERCIAL

## 2024-09-01 DIAGNOSIS — I10 ESSENTIAL HYPERTENSION: ICD-10-CM

## 2024-09-01 DIAGNOSIS — M86.171 ACUTE OSTEOMYELITIS OF RIGHT FOOT (HCC): ICD-10-CM

## 2024-09-01 DIAGNOSIS — R09.02 HYPOXIA: Primary | ICD-10-CM

## 2024-09-01 DIAGNOSIS — I50.9 CHF (CONGESTIVE HEART FAILURE) (HCC): ICD-10-CM

## 2024-09-01 DIAGNOSIS — L97.519 RIGHT FOOT ULCER (HCC): ICD-10-CM

## 2024-09-01 DIAGNOSIS — L08.9 DIABETIC FOOT INFECTION  (HCC): ICD-10-CM

## 2024-09-01 DIAGNOSIS — J96.01 ACUTE RESPIRATORY FAILURE WITH HYPOXIA (HCC): ICD-10-CM

## 2024-09-01 DIAGNOSIS — I50.9 ACUTE EXACERBATION OF CHF (CONGESTIVE HEART FAILURE) (HCC): ICD-10-CM

## 2024-09-01 DIAGNOSIS — L97.529: ICD-10-CM

## 2024-09-01 DIAGNOSIS — R05.8 PRODUCTIVE COUGH: ICD-10-CM

## 2024-09-01 DIAGNOSIS — E11.628 DIABETIC FOOT INFECTION  (HCC): ICD-10-CM

## 2024-09-01 DIAGNOSIS — R65.10 SIRS (SYSTEMIC INFLAMMATORY RESPONSE SYNDROME) (HCC): ICD-10-CM

## 2024-09-01 PROBLEM — L89.899: Status: ACTIVE | Noted: 2024-09-01

## 2024-09-01 LAB
2HR DELTA HS TROPONIN: 51 NG/L
4HR DELTA HS TROPONIN: 426 NG/L
ALBUMIN SERPL BCG-MCNC: 3.7 G/DL (ref 3.5–5)
ALP SERPL-CCNC: 124 U/L (ref 34–104)
ALT SERPL W P-5'-P-CCNC: 15 U/L (ref 7–52)
ANION GAP SERPL CALCULATED.3IONS-SCNC: 10 MMOL/L (ref 4–13)
AORTIC ROOT: 3.6 CM
AORTIC VALVE MEAN VELOCITY: 23.7 M/S
ASCENDING AORTA: 3.2 CM
AST SERPL W P-5'-P-CCNC: 16 U/L (ref 13–39)
ATRIAL RATE: 126 BPM
AV AREA BY CONTINUOUS VTI: 1 CM2
AV AREA PEAK VELOCITY: 0.8 CM2
AV LVOT MEAN GRADIENT: 1 MMHG
AV LVOT PEAK GRADIENT: 2 MMHG
AV MEAN PRESS GRAD SYS DOP V1V2: 27 MMHG
AV ORIFICE AREA US: 1.03 CM2
AV PEAK GRADIENT: 53 MMHG
AV VELOCITY RATIO: 0.2
AV VMAX SYS DOP: 3.65 M/S
BASOPHILS # BLD AUTO: 0.11 THOUSANDS/ÂΜL (ref 0–0.1)
BASOPHILS NFR BLD AUTO: 1 % (ref 0–1)
BILIRUB SERPL-MCNC: 0.45 MG/DL (ref 0.2–1)
BNP SERPL-MCNC: 655 PG/ML (ref 0–100)
BSA FOR ECHO PROCEDURE: 2.17 M2
BUN SERPL-MCNC: 31 MG/DL (ref 5–25)
CALCIUM SERPL-MCNC: 9.2 MG/DL (ref 8.4–10.2)
CARDIAC TROPONIN I PNL SERPL HS: 105 NG/L
CARDIAC TROPONIN I PNL SERPL HS: 1205 NG/L (ref 8–18)
CARDIAC TROPONIN I PNL SERPL HS: 480 NG/L
CARDIAC TROPONIN I PNL SERPL HS: 54 NG/L
CHLORIDE SERPL-SCNC: 101 MMOL/L (ref 96–108)
CO2 SERPL-SCNC: 24 MMOL/L (ref 21–32)
CREAT SERPL-MCNC: 1.57 MG/DL (ref 0.6–1.3)
DOP CALC AO VTI: 85.22 CM
DOP CALC LVOT AREA: 4.15 CM2
DOP CALC LVOT CARDIAC INDEX: 2.91 L/MIN/M2
DOP CALC LVOT CARDIAC OUTPUT: 6.33 L/MIN
DOP CALC LVOT DIAMETER: 2.3 CM
DOP CALC LVOT PEAK VEL VTI: 21.05 CM
DOP CALC LVOT PEAK VEL: 0.72 M/S
DOP CALC LVOT STROKE INDEX: 41.3 ML/M2
DOP CALC LVOT STROKE VOLUME: 87.41
E WAVE DECELERATION TIME: 152 MS
E/A RATIO: 1.17
EOSINOPHIL # BLD AUTO: 0.47 THOUSAND/ÂΜL (ref 0–0.61)
EOSINOPHIL NFR BLD AUTO: 3 % (ref 0–6)
ERYTHROCYTE [DISTWIDTH] IN BLOOD BY AUTOMATED COUNT: 13.7 % (ref 11.6–15.1)
EST. AVERAGE GLUCOSE BLD GHB EST-MCNC: 235 MG/DL
FLUAV RNA RESP QL NAA+PROBE: NEGATIVE
FLUBV RNA RESP QL NAA+PROBE: NEGATIVE
FRACTIONAL SHORTENING: 20 (ref 28–44)
GFR SERPL CREATININE-BSD FRML MDRD: 48 ML/MIN/1.73SQ M
GLUCOSE SERPL-MCNC: 309 MG/DL (ref 65–140)
GLUCOSE SERPL-MCNC: 361 MG/DL (ref 65–140)
GLUCOSE SERPL-MCNC: 391 MG/DL (ref 65–140)
GLUCOSE SERPL-MCNC: 421 MG/DL (ref 65–140)
GLUCOSE SERPL-MCNC: 500 MG/DL (ref 65–140)
HBA1C MFR BLD: 9.8 %
HCT VFR BLD AUTO: 43.4 % (ref 36.5–49.3)
HGB BLD-MCNC: 13.8 G/DL (ref 12–17)
IMM GRANULOCYTES # BLD AUTO: 0.21 THOUSAND/UL (ref 0–0.2)
IMM GRANULOCYTES NFR BLD AUTO: 1 % (ref 0–2)
INTERVENTRICULAR SEPTUM IN DIASTOLE (PARASTERNAL SHORT AXIS VIEW): 1.2 CM
INTERVENTRICULAR SEPTUM: 1.2 CM (ref 0.6–1.1)
LAAS-AP2: 21.2 CM2
LAAS-AP4: 20.7 CM2
LACTATE SERPL-SCNC: 1.7 MMOL/L (ref 0.5–2)
LEFT ATRIUM SIZE: 3.9 CM
LEFT ATRIUM VOLUME (MOD BIPLANE): 66 ML
LEFT ATRIUM VOLUME INDEX (MOD BIPLANE): 30.3 ML/M2
LEFT INTERNAL DIMENSION IN SYSTOLE: 3.9 CM (ref 2.1–4)
LEFT VENTRICULAR INTERNAL DIMENSION IN DIASTOLE: 4.9 CM (ref 3.5–6)
LEFT VENTRICULAR POSTERIOR WALL IN END DIASTOLE: 1.1 CM
LEFT VENTRICULAR STROKE VOLUME: 47 ML
LV EF US.2D.A4C+ESTIMATED: 47 %
LVSV (TEICH): 47 ML
LYMPHOCYTES # BLD AUTO: 4.73 THOUSANDS/ÂΜL (ref 0.6–4.47)
LYMPHOCYTES NFR BLD AUTO: 27 % (ref 14–44)
MCH RBC QN AUTO: 27.2 PG (ref 26.8–34.3)
MCHC RBC AUTO-ENTMCNC: 31.8 G/DL (ref 31.4–37.4)
MCV RBC AUTO: 85 FL (ref 82–98)
MONOCYTES # BLD AUTO: 1.31 THOUSAND/ÂΜL (ref 0.17–1.22)
MONOCYTES NFR BLD AUTO: 8 % (ref 4–12)
MV E'TISSUE VEL-SEP: 6 CM/S
MV PEAK A VEL: 0.94 M/S
MV PEAK E VEL: 110 CM/S
MV STENOSIS PRESSURE HALF TIME: 44 MS
MV VALVE AREA P 1/2 METHOD: 5
NEUTROPHILS # BLD AUTO: 10.62 THOUSANDS/ÂΜL (ref 1.85–7.62)
NEUTS SEG NFR BLD AUTO: 60 % (ref 43–75)
NRBC BLD AUTO-RTO: 0 /100 WBCS
P AXIS: 74 DEGREES
PLATELET # BLD AUTO: 286 THOUSANDS/UL (ref 149–390)
PLATELET # BLD AUTO: 423 THOUSANDS/UL (ref 149–390)
PMV BLD AUTO: 10.5 FL (ref 8.9–12.7)
PMV BLD AUTO: 10.7 FL (ref 8.9–12.7)
POTASSIUM SERPL-SCNC: 3.6 MMOL/L (ref 3.5–5.3)
PR INTERVAL: 156 MS
PROCALCITONIN SERPL-MCNC: 0.09 NG/ML
PROT SERPL-MCNC: 8.4 G/DL (ref 6.4–8.4)
QRS AXIS: 61 DEGREES
QRSD INTERVAL: 94 MS
QT INTERVAL: 300 MS
QTC INTERVAL: 434 MS
RBC # BLD AUTO: 5.08 MILLION/UL (ref 3.88–5.62)
RIGHT ATRIUM AREA SYSTOLE A4C: 18.7 CM2
RIGHT VENTRICLE ID DIMENSION: 3.9 CM
RSV RNA RESP QL NAA+PROBE: NEGATIVE
SARS-COV-2 RNA RESP QL NAA+PROBE: NEGATIVE
SL CV LEFT ATRIUM LENGTH A2C: 5.3 CM
SL CV PED ECHO LEFT VENTRICLE DIASTOLIC VOLUME (MOD BIPLANE) 2D: 115 ML
SL CV PED ECHO LEFT VENTRICLE SYSTOLIC VOLUME (MOD BIPLANE) 2D: 68 ML
SODIUM SERPL-SCNC: 135 MMOL/L (ref 135–147)
T WAVE AXIS: 20 DEGREES
TRICUSPID ANNULAR PLANE SYSTOLIC EXCURSION: 2.3 CM
VENTRICULAR RATE: 126 BPM
WBC # BLD AUTO: 17.45 THOUSAND/UL (ref 4.31–10.16)

## 2024-09-01 PROCEDURE — 93010 ELECTROCARDIOGRAM REPORT: CPT | Performed by: STUDENT IN AN ORGANIZED HEALTH CARE EDUCATION/TRAINING PROGRAM

## 2024-09-01 PROCEDURE — 99285 EMERGENCY DEPT VISIT HI MDM: CPT

## 2024-09-01 PROCEDURE — 84484 ASSAY OF TROPONIN QUANT: CPT | Performed by: EMERGENCY MEDICINE

## 2024-09-01 PROCEDURE — 36600 WITHDRAWAL OF ARTERIAL BLOOD: CPT

## 2024-09-01 PROCEDURE — 82948 REAGENT STRIP/BLOOD GLUCOSE: CPT

## 2024-09-01 PROCEDURE — 93306 TTE W/DOPPLER COMPLETE: CPT | Performed by: STUDENT IN AN ORGANIZED HEALTH CARE EDUCATION/TRAINING PROGRAM

## 2024-09-01 PROCEDURE — 94760 N-INVAS EAR/PLS OXIMETRY 1: CPT

## 2024-09-01 PROCEDURE — 36415 COLL VENOUS BLD VENIPUNCTURE: CPT

## 2024-09-01 PROCEDURE — 85049 AUTOMATED PLATELET COUNT: CPT | Performed by: NURSE PRACTITIONER

## 2024-09-01 PROCEDURE — 71275 CT ANGIOGRAPHY CHEST: CPT

## 2024-09-01 PROCEDURE — 84484 ASSAY OF TROPONIN QUANT: CPT

## 2024-09-01 PROCEDURE — 73620 X-RAY EXAM OF FOOT: CPT

## 2024-09-01 PROCEDURE — 83605 ASSAY OF LACTIC ACID: CPT

## 2024-09-01 PROCEDURE — 87040 BLOOD CULTURE FOR BACTERIA: CPT

## 2024-09-01 PROCEDURE — 84145 PROCALCITONIN (PCT): CPT

## 2024-09-01 PROCEDURE — 84484 ASSAY OF TROPONIN QUANT: CPT | Performed by: NURSE PRACTITIONER

## 2024-09-01 PROCEDURE — 99223 1ST HOSP IP/OBS HIGH 75: CPT | Performed by: STUDENT IN AN ORGANIZED HEALTH CARE EDUCATION/TRAINING PROGRAM

## 2024-09-01 PROCEDURE — 96374 THER/PROPH/DIAG INJ IV PUSH: CPT

## 2024-09-01 PROCEDURE — 96375 TX/PRO/DX INJ NEW DRUG ADDON: CPT

## 2024-09-01 PROCEDURE — 71045 X-RAY EXAM CHEST 1 VIEW: CPT

## 2024-09-01 PROCEDURE — 83036 HEMOGLOBIN GLYCOSYLATED A1C: CPT | Performed by: NURSE PRACTITIONER

## 2024-09-01 PROCEDURE — 85025 COMPLETE CBC W/AUTO DIFF WBC: CPT | Performed by: EMERGENCY MEDICINE

## 2024-09-01 PROCEDURE — 0241U HB NFCT DS VIR RESP RNA 4 TRGT: CPT

## 2024-09-01 PROCEDURE — 99222 1ST HOSP IP/OBS MODERATE 55: CPT | Performed by: STUDENT IN AN ORGANIZED HEALTH CARE EDUCATION/TRAINING PROGRAM

## 2024-09-01 PROCEDURE — 93306 TTE W/DOPPLER COMPLETE: CPT

## 2024-09-01 PROCEDURE — 87081 CULTURE SCREEN ONLY: CPT | Performed by: NURSE PRACTITIONER

## 2024-09-01 PROCEDURE — 93005 ELECTROCARDIOGRAM TRACING: CPT

## 2024-09-01 PROCEDURE — 94002 VENT MGMT INPAT INIT DAY: CPT

## 2024-09-01 PROCEDURE — 80053 COMPREHEN METABOLIC PANEL: CPT | Performed by: EMERGENCY MEDICINE

## 2024-09-01 PROCEDURE — 83880 ASSAY OF NATRIURETIC PEPTIDE: CPT

## 2024-09-01 RX ORDER — VANCOMYCIN HYDROCHLORIDE 750 MG/150ML
750 INJECTION, SOLUTION INTRAVENOUS EVERY 12 HOURS
Status: DISCONTINUED | OUTPATIENT
Start: 2024-09-01 | End: 2024-09-02

## 2024-09-01 RX ORDER — INSULIN LISPRO 100 [IU]/ML
1-6 INJECTION, SOLUTION INTRAVENOUS; SUBCUTANEOUS
Status: DISCONTINUED | OUTPATIENT
Start: 2024-09-01 | End: 2024-09-01

## 2024-09-01 RX ORDER — ONDANSETRON 2 MG/ML
4 INJECTION INTRAMUSCULAR; INTRAVENOUS EVERY 6 HOURS PRN
Status: DISCONTINUED | OUTPATIENT
Start: 2024-09-01 | End: 2024-09-12 | Stop reason: HOSPADM

## 2024-09-01 RX ORDER — FUROSEMIDE 10 MG/ML
20 INJECTION INTRAMUSCULAR; INTRAVENOUS ONCE
Status: COMPLETED | OUTPATIENT
Start: 2024-09-01 | End: 2024-09-01

## 2024-09-01 RX ORDER — FUROSEMIDE 20 MG/1
20 TABLET ORAL ONCE
Status: DISCONTINUED | OUTPATIENT
Start: 2024-09-01 | End: 2024-09-01

## 2024-09-01 RX ORDER — PANTOPRAZOLE SODIUM 40 MG/1
40 TABLET, DELAYED RELEASE ORAL
Status: DISCONTINUED | OUTPATIENT
Start: 2024-09-01 | End: 2024-09-12 | Stop reason: HOSPADM

## 2024-09-01 RX ORDER — FUROSEMIDE 40 MG/1
40 TABLET ORAL DAILY
Status: DISCONTINUED | OUTPATIENT
Start: 2024-09-01 | End: 2024-09-12 | Stop reason: HOSPADM

## 2024-09-01 RX ORDER — HEPARIN SODIUM 5000 [USP'U]/ML
5000 INJECTION, SOLUTION INTRAVENOUS; SUBCUTANEOUS EVERY 8 HOURS SCHEDULED
Status: DISCONTINUED | OUTPATIENT
Start: 2024-09-01 | End: 2024-09-12 | Stop reason: HOSPADM

## 2024-09-01 RX ORDER — CHLORHEXIDINE GLUCONATE ORAL RINSE 1.2 MG/ML
15 SOLUTION DENTAL EVERY 12 HOURS SCHEDULED
Status: DISCONTINUED | OUTPATIENT
Start: 2024-09-01 | End: 2024-09-02

## 2024-09-01 RX ORDER — LISINOPRIL 20 MG/1
20 TABLET ORAL DAILY
Status: DISCONTINUED | OUTPATIENT
Start: 2024-09-01 | End: 2024-09-03

## 2024-09-01 RX ORDER — CLOPIDOGREL BISULFATE 75 MG/1
75 TABLET ORAL DAILY
Status: DISCONTINUED | OUTPATIENT
Start: 2024-09-01 | End: 2024-09-01

## 2024-09-01 RX ORDER — ATORVASTATIN CALCIUM 40 MG/1
40 TABLET, FILM COATED ORAL DAILY
Status: DISCONTINUED | OUTPATIENT
Start: 2024-09-01 | End: 2024-09-12 | Stop reason: HOSPADM

## 2024-09-01 RX ORDER — METOPROLOL SUCCINATE 50 MG/1
50 TABLET, EXTENDED RELEASE ORAL DAILY
Status: DISCONTINUED | OUTPATIENT
Start: 2024-09-01 | End: 2024-09-12 | Stop reason: HOSPADM

## 2024-09-01 RX ORDER — INSULIN LISPRO 100 [IU]/ML
1-6 INJECTION, SOLUTION INTRAVENOUS; SUBCUTANEOUS
Status: DISCONTINUED | OUTPATIENT
Start: 2024-09-01 | End: 2024-09-12 | Stop reason: HOSPADM

## 2024-09-01 RX ORDER — INSULIN LISPRO 100 [IU]/ML
2-12 INJECTION, SOLUTION INTRAVENOUS; SUBCUTANEOUS
Status: DISCONTINUED | OUTPATIENT
Start: 2024-09-01 | End: 2024-09-12 | Stop reason: HOSPADM

## 2024-09-01 RX ORDER — METHYLPREDNISOLONE SOD SUCC 125 MG
1 VIAL (EA) INJECTION ONCE
Status: COMPLETED | OUTPATIENT
Start: 2024-09-01 | End: 2024-09-01

## 2024-09-01 RX ORDER — IPRATROPIUM BROMIDE AND ALBUTEROL SULFATE .5; 3 MG/3ML; MG/3ML
2 SOLUTION RESPIRATORY (INHALATION) ONCE
Status: COMPLETED | OUTPATIENT
Start: 2024-09-01 | End: 2024-09-01

## 2024-09-01 RX ORDER — AMLODIPINE BESYLATE 10 MG/1
10 TABLET ORAL DAILY
Status: DISCONTINUED | OUTPATIENT
Start: 2024-09-01 | End: 2024-09-10

## 2024-09-01 RX ADMIN — INSULIN LISPRO 12 UNITS: 100 INJECTION, SOLUTION INTRAVENOUS; SUBCUTANEOUS at 11:16

## 2024-09-01 RX ADMIN — HEPARIN SODIUM 5000 UNITS: 5000 INJECTION INTRAVENOUS; SUBCUTANEOUS at 21:09

## 2024-09-01 RX ADMIN — HEPARIN SODIUM 5000 UNITS: 5000 INJECTION INTRAVENOUS; SUBCUTANEOUS at 07:55

## 2024-09-01 RX ADMIN — FUROSEMIDE 20 MG: 10 INJECTION, SOLUTION INTRAMUSCULAR; INTRAVENOUS at 04:07

## 2024-09-01 RX ADMIN — CEFTRIAXONE SODIUM 1000 MG: 10 INJECTION, POWDER, FOR SOLUTION INTRAVENOUS at 05:59

## 2024-09-01 RX ADMIN — INSULIN LISPRO 6 UNITS: 100 INJECTION, SOLUTION INTRAVENOUS; SUBCUTANEOUS at 21:09

## 2024-09-01 RX ADMIN — CLOPIDOGREL 75 MG: 75 TABLET ORAL at 08:23

## 2024-09-01 RX ADMIN — CHLORHEXIDINE GLUCONATE 0.12% ORAL RINSE 15 ML: 1.2 LIQUID ORAL at 21:09

## 2024-09-01 RX ADMIN — FUROSEMIDE 40 MG: 40 TABLET ORAL at 08:23

## 2024-09-01 RX ADMIN — LISINOPRIL 20 MG: 20 TABLET ORAL at 08:23

## 2024-09-01 RX ADMIN — AMLODIPINE BESYLATE 10 MG: 10 TABLET ORAL at 08:23

## 2024-09-01 RX ADMIN — PANTOPRAZOLE SODIUM 40 MG: 40 TABLET, DELAYED RELEASE ORAL at 07:55

## 2024-09-01 RX ADMIN — VANCOMYCIN HYDROCHLORIDE 750 MG: 750 INJECTION, SOLUTION INTRAVENOUS at 19:39

## 2024-09-01 RX ADMIN — ASPIRIN 81 MG: 81 TABLET, COATED ORAL at 08:23

## 2024-09-01 RX ADMIN — INSULIN LISPRO 12 UNITS: 100 INJECTION, SOLUTION INTRAVENOUS; SUBCUTANEOUS at 08:22

## 2024-09-01 RX ADMIN — VANCOMYCIN HYDROCHLORIDE 2000 MG: 1 INJECTION, POWDER, LYOPHILIZED, FOR SOLUTION INTRAVENOUS at 08:22

## 2024-09-01 RX ADMIN — CHLORHEXIDINE GLUCONATE 0.12% ORAL RINSE 15 ML: 1.2 LIQUID ORAL at 08:23

## 2024-09-01 RX ADMIN — ATORVASTATIN CALCIUM 40 MG: 40 TABLET, FILM COATED ORAL at 08:23

## 2024-09-01 RX ADMIN — HEPARIN SODIUM 5000 UNITS: 5000 INJECTION INTRAVENOUS; SUBCUTANEOUS at 17:10

## 2024-09-01 RX ADMIN — IOHEXOL 100 ML: 350 INJECTION, SOLUTION INTRAVENOUS at 04:46

## 2024-09-01 RX ADMIN — INSULIN LISPRO 8 UNITS: 100 INJECTION, SOLUTION INTRAVENOUS; SUBCUTANEOUS at 17:10

## 2024-09-01 RX ADMIN — METOPROLOL SUCCINATE 50 MG: 50 TABLET, EXTENDED RELEASE ORAL at 08:23

## 2024-09-01 RX ADMIN — ONDANSETRON 4 MG: 2 INJECTION INTRAMUSCULAR; INTRAVENOUS at 11:30

## 2024-09-01 NOTE — ASSESSMENT & PLAN NOTE
Baseline crt 1.1-1.5  Presenting with crt 1.57  Likely secondary to chf exacerbation   Appears overall euvolemic   Will monitor scrt  Avoid nephrotoxic agents

## 2024-09-01 NOTE — ASSESSMENT & PLAN NOTE
Lab Results   Component Value Date    HGBA1C 9.0 (H) 10/15/2023       Recent Labs     09/01/24  0722 09/01/24  1042   POCGLU 500* 421*       Blood Sugar Average: Last 72 hrs:  (P) 460.5

## 2024-09-01 NOTE — PLAN OF CARE
Problem: SAFETY ADULT  Goal: Patient will remain free of falls  Description: INTERVENTIONS:  - Educate patient/family on patient safety including physical limitations  - Instruct patient to call for assistance with activity   - Consult OT/PT to assist with strengthening/mobility   - Keep Call bell within reach  - Keep bed low and locked with side rails adjusted as appropriate  - Keep care items and personal belongings within reach  - Initiate and maintain comfort rounds  - Make Fall Risk Sign visible to staff  - Initiate/Maintain bed alarm  - Apply yellow socks and bracelet for high fall risk patients  - Consider moving patient to room near nurses station  Outcome: Progressing

## 2024-09-01 NOTE — H&P
[facility]  H&P  Name: Zain Gayle 59 y.o. male I MRN: [id number]  Unit/Bed#: ICU 12 I Date of Admission: 9/1/2024   Date of Service: 9/1/2024 I Hospital Day: 0      Assessment & Plan   Acute respiratory failure with hypoxia (HCC)  Assessment & Plan  Likely secondary to CHF exacerbation   CT negative for PE with overall appearance of volume overload  Required BIPAP for a short period of time   Rapid improvement in respiratory status with 20 mg IV lasix  Will continue with PRN IV lasix to maintain negative fluid balance   Can restart home dose lasix tomorrow likely   Continue with traditional nasal cannula   Early mobilization   Incentive spirometry     Acute exacerbation of CHF (congestive heart failure) (Formerly [facility])  Assessment & Plan  Wt Readings from Last 3 Encounters:   09/01/24 97.7 kg (215 lb 6.2 oz)   10/05/21 104 kg (229 lb)     Presented with acute onset shortness of breath  CT negative for PE with overall appearance of volume overload  Clinically, he appears fairly euvolemic  Rapid improvement in respiratory symptoms with diuretic challenge   Has history of moderate aortic stenosis  Patient states that he has been discussing valve replacement options with his cardiologist  Will repeat echocardiogram to evaluate for advanced stenosis   Consult to cardiology   Will continue lasix as needed to maintain negative fluid balance           Aortic valve stenosis  Assessment & Plan  Noted to have moderate aortic stenosis with fused valve on echocardiogram in 2023  Patient has been discussing valve replacement with his outpatient cardiology   Has had several admissions for heart failure exacerbation     ODALYS (acute kidney injury) (Formerly [facility])  Assessment & Plan  Baseline crt 1.1-1.5  Presenting with crt 1.57  Likely secondary to chf exacerbation   Appears overall euvolemic   Will monitor scrt  Avoid nephrotoxic agents     Bilateral pressure ulcer of feet  Assessment & Plan  Right foot ulcer initially  "caused by brown recluse spider 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  Patient states that he \"stubbed his toe\"  Foul smelling drainage with area of wound bed necrosis  Will obtain xrays of each feet  Consult wound care   Consult podiatry     Type 2 diabetes mellitus with hyperglycemia (HCC)  Assessment & Plan  Lab Results   Component Value Date    HGBA1C 9.0 (H) 10/15/2023       Recent Labs     09/01/24  0722   POCGLU 500*       Blood Sugar Average: Last 72 hrs:  (P) 500      Gastroesophageal reflux disease without esophagitis  Assessment & Plan  Continue with PPI     Essential hypertension  Assessment & Plan  Will continue with home dose antihypertensives            History of Present Illness     HPI: Zain Gayle is a 59 y.o. who presents with a past medical history of CAD, CHF, DM, HTN, bilateral foot ulcerations, moderate aortic stenosis.  He presents to the emergency department for acute onset of shortness of breath at approximately 0230.  He denies fever, chills, chest pain, palpitations.  He feels this is similar to previous chf exacerbations that he has had.  In the emergency department he required brief trial of BIPAP.  He is referred to the SD unit for further management and care.      History obtained from chart review and the patient.  Review of Systems: Review of Systems   Constitutional:  Negative for chills, diaphoresis, fatigue and fever.   HENT: Negative.     Eyes: Negative.    Respiratory:  Positive for shortness of breath.    Cardiovascular: Negative.    Gastrointestinal: Negative.    Endocrine: Negative.    Genitourinary: Negative.    Musculoskeletal: Negative.    Skin: Negative.    Allergic/Immunologic: Negative.    Neurological: Negative.    Hematological: Negative.    Psychiatric/Behavioral: Negative.       Disposition: Stepdown Level 1  Historical Information   Past Medical History:  No date: Allergic  No date: Arthritis  No date: " Diabetes mellitus (HCC)  No date: Hypertension  No date: MRSA exposure Past Surgical History:  No date: CARPAL TUNNEL RELEASE  No date: KNEE SURGERY  No date: TOE AMPUTATION   Current Outpatient Medications   Medication Instructions    amLODIPine (NORVASC) 10 mg, Oral, Daily    aspirin (ECOTRIN LOW STRENGTH) 81 mg, Oral, Daily    atorvastatin (LIPITOR) 40 mg, Oral, Daily    benzonatate (TESSALON) 200 mg, Oral, 3 times daily PRN    clopidogrel (PLAVIX) 75 mg tablet     furosemide (LASIX) 40 mg, Oral, Daily    lisinopril (ZESTRIL) 20 mg, Daily    metoprolol succinate (TOPROL-XL) 50 mg, Oral, Daily    omeprazole (PRILOSEC) 20 mg, Oral, Daily    pseudoephedrine-acetaminophen (TYLENOL SINUS)  MG TABS 1 tablet, Every 4 hours PRN    Trulicity 0.75 mg, Subcutaneous, Every 7 days, Pt. Takes on Fridays     No Known Allergies   Social History     Tobacco Use    Smoking status: Former    Smokeless tobacco: Never   Vaping Use    Vaping status: Never Used    Family History   Problem Relation Age of Onset    Heart disease Mother     Heart disease Father     Cancer Father           Objective                            Vitals I/O      Most Recent Min/Max in 24hrs   Temp 98.3 °F (36.8 °C) Temp  Min: 98.3 °F (36.8 °C)  Max: 98.5 °F (36.9 °C)   Pulse 78 Pulse  Min: 78  Max: 126   Resp (!) 29 Resp  Min: 24  Max: 29   /75 BP  Min: 114/61  Max: 171/101   O2 Sat 96 % SpO2  Min: 91 %  Max: 96 %      Intake/Output Summary (Last 24 hours) at 9/1/2024 0827  Last data filed at 9/1/2024 0801  Gross per 24 hour   Intake 480 ml   Output 400 ml   Net 80 ml       Diet Cardiovascular; Cardiac; Consistent Carbohydrate Diet Level 3 (6 carb servings/90 grams CHO/meal)    Invasive Monitoring           Physical Exam   Physical Exam  Eyes:      General: Lids are normal.      Extraocular Movements: Extraocular movements intact.      Conjunctiva/sclera: Conjunctivae normal.   Feet:      Comments: Chronic ulcerations of bilateral feet   Chronic  digit amputation   Skin:     General: Skin is warm and dry.   HENT:      Head: Normocephalic and atraumatic.   Neck:      Trachea: Trachea normal.   Cardiovascular:      Rate and Rhythm: Normal rate.      Pulses: Normal pulses.   Musculoskeletal:      Cervical back: Normal range of motion.      Right lower leg: No edema.      Left lower leg: No edema.      Right Lower Extremity: Right leg is amputated below ankle.   Abdominal:      Palpations: Abdomen is soft.      Tenderness: There is abdominal tenderness.   Constitutional:       General: He is awake.      Appearance: He is overweight.      Interventions: Nasal cannula in place.   Pulmonary:      Breath sounds: Decreased breath sounds present.   Psychiatric:         Behavior: Behavior is cooperative.   Neurological:      Mental Status: He is alert.      GCS: GCS eye subscore is 4. GCS verbal subscore is 5. GCS motor subscore is 6.      Sensory: Sensation is intact.      Coordination: Coordination is intact.            Diagnostic Studies      EKG: NSR   Imaging:   CTA ED chest PE Study   Final Result   Diffuse bilateral multifocal groundglass opacities throughout the lungs most consistent with multifocal infectious process. Early ARDS can have a similar appearance. Clinical correlation is advised   No central or segmental pulmonary embolus.                  Workstation performed: [id number]         XR chest 1 view portable   ED Interpretation   Mild pulmonary edema      XR foot 2 vw right    (Results Pending)   XR foot 2 vw left    (Results Pending)         Medications:  Scheduled PRN   amLODIPine, 10 mg, Daily  aspirin, 81 mg, Daily  atorvastatin, 40 mg, Daily  [START ON 9/2/2024] cefTRIAXone, 1,000 mg, Q24H  chlorhexidine, 15 mL, Q12H CHRISTIANO  clopidogrel, 75 mg, Daily  furosemide, 40 mg, Daily  heparin (porcine), 5,000 Units, Q8H CHRISTIANO  insulin lispro, 1-6 Units, HS  insulin lispro, 2-12 Units, TID AC  lisinopril, 20 mg, Daily  metoprolol succinate, 50 mg,  Daily  pantoprazole, 40 mg, Early Morning  vancomycin, 25 mg/kg (Adjusted), Once  vancomycin, 750 mg, Q12H          Continuous          Labs:    CBC    Recent Labs     09/01/24  0351 09/01/24  0756   WBC 17.45*  --    HGB 13.8  --    HCT 43.4  --    * 286     BMP    Recent Labs     09/01/24  0351   SODIUM 135   K 3.6      CO2 24   AGAP 10   BUN 31*   CREATININE 1.57*   CALCIUM 9.2       Coags    No recent results     Additional Electrolytes  No recent results       Blood Gas    No recent results  No recent results LFTs  Recent Labs     09/01/24  0351   ALT 15   AST 16   ALKPHOS 124*   ALB 3.7   TBILI 0.45       Infectious  Recent Labs     09/01/24  0547   PROCALCITONI 0.09     Glucose  Recent Labs     09/01/24  0351   GLUC 391*             Anticipated Length of Stay is > 2 midnights  CHERIE Bates

## 2024-09-01 NOTE — ASSESSMENT & PLAN NOTE
Wt Readings from Last 3 Encounters:   09/01/24 97.5 kg (215 lb)   10/05/21 104 kg (229 lb)   Responded well to Lasix 20 mg IV on admission.  Patient now appears euvolemic  Transition to patient's home dose of Lasix 40 mg p.o. daily.

## 2024-09-01 NOTE — ASSESSMENT & PLAN NOTE
S/p BiPAP and IV Lasix.  Significantly improved  CT scan concerning for respiratory illness as well, on IV abx, management per primary team

## 2024-09-01 NOTE — ASSESSMENT & PLAN NOTE
Lab Results   Component Value Date    HGBA1C 9.0 (H) 10/15/2023       Recent Labs     09/01/24  0722   POCGLU 500*       Blood Sugar Average: Last 72 hrs:  (P) 500

## 2024-09-01 NOTE — ED PROVIDER NOTES
History  Chief Complaint   Patient presents with    Shortness of Breath     Pt. Arrives ED via ambulance for acute SOB with cough. Duoneb x2 and solumed 125mg. enroute. Home air sat was 88%     Patient is a 59-year-old male who presents to the emergency room for shortness of breath.  Patient reports he woke up around 2:30 AM with shortness of breath and productive cough.  Denies any other symptoms.  Patient reports he feels fluid overloaded, feels similar to previous CHF exacerbations. Patient is compliant with home Lasix.  Patient was given DuoNebs and Solu-Medrol en route by EMS.  Per EMS, home O2 saturation 88%.  New O2 requirement in the ED, requiring 2L NC with 93% O2 on initial evaluation.       Shortness of Breath  Associated symptoms: cough    Associated symptoms: no abdominal pain, no chest pain, no ear pain, no fever, no headaches, no rash, no sore throat and no vomiting        Prior to Admission Medications   Prescriptions Last Dose Informant Patient Reported? Taking?   amLODIPine (NORVASC) 10 mg tablet   Yes No   Sig: Take 10 mg by mouth daily   aspirin (ECOTRIN LOW STRENGTH) 81 mg EC tablet   Yes No   Sig: Take 81 mg by mouth daily   atorvastatin (LIPITOR) 40 mg tablet   Yes No   Sig: Take 40 mg by mouth daily   benzonatate (TESSALON) 200 MG capsule   No No   Sig: Take 1 capsule (200 mg total) by mouth 3 (three) times a day as needed for cough   Patient not taking: Reported on 8/20/2024   clopidogrel (PLAVIX) 75 mg tablet   Yes No   Patient not taking: Reported on 8/20/2024   dulaglutide (Trulicity) 0.75 MG/0.5ML injection   Yes No   Sig: Inject 0.75 mg under the skin every 7 days Pt. Takes on Fridays   furosemide (LASIX) 40 mg tablet   Yes No   Sig: Take 40 mg by mouth daily   lisinopril (ZESTRIL) 20 mg tablet   Yes No   Sig: Take 20 mg by mouth daily   Patient not taking: Reported on 1/14/2024   metoprolol succinate (TOPROL-XL) 50 mg 24 hr tablet   Yes No   Sig: Take 50 mg by mouth daily   omeprazole  (PriLOSEC) 20 mg delayed release capsule   Yes No   Sig: Take 20 mg by mouth daily   pseudoephedrine-acetaminophen (TYLENOL SINUS)  MG TABS   Yes No   Sig: Take 1 tablet by mouth every 4 (four) hours as needed for congestion   Patient not taking: Reported on 8/20/2024      Facility-Administered Medications: None       Past Medical History:   Diagnosis Date    Allergic     Arthritis     Diabetes mellitus (HCC)     Hypertension     MRSA exposure        Past Surgical History:   Procedure Laterality Date    CARPAL TUNNEL RELEASE      KNEE SURGERY      TOE AMPUTATION         Family History   Problem Relation Age of Onset    Heart disease Mother     Heart disease Father     Cancer Father      I have reviewed and agree with the history as documented.    E-Cigarette/Vaping    E-Cigarette Use Never User      E-Cigarette/Vaping Substances     Social History     Tobacco Use    Smoking status: Former    Smokeless tobacco: Never   Vaping Use    Vaping status: Never Used       Review of Systems   Constitutional:  Negative for chills and fever.   HENT:  Negative for ear pain, sore throat, trouble swallowing and voice change.    Eyes:  Negative for pain and visual disturbance.   Respiratory:  Positive for cough and shortness of breath.    Cardiovascular:  Negative for chest pain and palpitations.   Gastrointestinal:  Negative for abdominal pain, nausea and vomiting.   Genitourinary:  Negative for dysuria, flank pain and hematuria.   Musculoskeletal:  Negative for arthralgias and back pain.   Skin:  Negative for color change and rash.   Neurological:  Negative for seizures, syncope and headaches.   Psychiatric/Behavioral:  Negative for confusion.    All other systems reviewed and are negative.      Physical Exam  Physical Exam  Vitals and nursing note reviewed.   Constitutional:       General: He is not in acute distress.     Appearance: He is well-developed. He is obese. He is ill-appearing and diaphoretic.   HENT:      Head:  Normocephalic and atraumatic.   Eyes:      Conjunctiva/sclera: Conjunctivae normal.   Cardiovascular:      Rate and Rhythm: Regular rhythm. Tachycardia present.      Pulses: Normal pulses.      Heart sounds: No murmur heard.  Pulmonary:      Effort: Tachypnea and accessory muscle usage present. No respiratory distress.      Breath sounds: Rhonchi and rales present.   Abdominal:      Palpations: Abdomen is soft.      Tenderness: There is no abdominal tenderness.   Musculoskeletal:         General: No swelling.      Cervical back: Neck supple.   Skin:     General: Skin is warm.      Capillary Refill: Capillary refill takes less than 2 seconds.   Neurological:      General: No focal deficit present.      Mental Status: He is alert and oriented to person, place, and time.   Psychiatric:         Mood and Affect: Mood normal.         Vital Signs  ED Triage Vitals   Temperature Pulse Respirations Blood Pressure SpO2   09/01/24 0349 09/01/24 0345 09/01/24 0345 09/01/24 0345 09/01/24 0345   98.5 °F (36.9 °C) (!) 126 (!) 24 (!) 171/101 93 %      Temp Source Heart Rate Source Patient Position - Orthostatic VS BP Location FiO2 (%)   09/01/24 0349 09/01/24 0345 09/01/24 0345 -- 09/01/24 0600   Oral Monitor Sitting  40      Pain Score       09/01/24 0345       No Pain           Vitals:    09/01/24 0345 09/01/24 0500 09/01/24 0600   BP: (!) 171/101 114/61 120/64   Pulse: (!) 126 94 81   Patient Position - Orthostatic VS: Sitting           Visual Acuity      ED Medications  Medications   ceftriaxone (ROCEPHIN) 1 g/50 mL in dextrose IVPB (1,000 mg Intravenous New Bag 9/1/24 5741)   ipratropium-albuterol (FOR EMS ONLY) (DUO-NEB) 0.5-2.5 mg/3 mL inhalation solution 6 mL (0 mL Does not apply Given to EMS 9/1/24 0351)   methylPREDNISolone sodium succinate (FOR EMS ONLY) (Solu-MEDROL) 125 MG injection 125 mg (0 mg Does not apply Given to EMS 9/1/24 0351)   furosemide (LASIX) injection 20 mg (20 mg Intravenous Given 9/1/24 3957)   iohexol  (OMNIPAQUE) 350 MG/ML injection (MULTI-DOSE) 100 mL (100 mL Intravenous Given 9/1/24 0446)       Diagnostic Studies  Results Reviewed       Procedure Component Value Units Date/Time    Lactic acid, plasma (w/reflex if result > 2.0) [[id number]]  (Normal) Collected: 09/01/24 0547    Lab Status: Final result Specimen: Blood from Arm, Left Updated: 09/01/24 0613     LACTIC ACID 1.7 mmol/L     Narrative:      Result may be elevated if tourniquet was used during collection.    Blood culture #1 [[id number]] Collected: 09/01/24 0557    Lab Status: In process Specimen: Blood from Arm, Right Updated: 09/01/24 0559    Procalcitonin [[id number]] Collected: 09/01/24 0547    Lab Status: In process Specimen: Blood from Arm, Left Updated: 09/01/24 0551    HS Troponin I 2hr [[id number]] Collected: 09/01/24 0547    Lab Status: In process Specimen: Blood from Arm, Left Updated: 09/01/24 0551    Blood culture #2 [[id number]] Collected: 09/01/24 0547    Lab Status: In process Specimen: Blood from Arm, Left Updated: 09/01/24 0551    FLU/RSV/COVID - if FLU/RSV clinically relevant [[id number]]  (Normal) Collected: 09/01/24 0402    Lab Status: Final result Specimen: Nares from Nose Updated: 09/01/24 0450     SARS-CoV-2 Negative     INFLUENZA A PCR Negative     INFLUENZA B PCR Negative     RSV PCR Negative    Narrative:      This test has been performed using the CoV-2/Flu/RSV plus assay on the [employer] GeneXpert platform. This test has been validated by the  and verified by the performing laboratory.     This test is designed to amplify and detect the following: nucleocapsid (N), envelope (E), and RNA-dependent RNA polymerase (RdRP) genes of the SARS-CoV-2 genome; matrix (M), basic polymerase (PB2), and acidic protein (PA) segments of the influenza A genome; matrix (M) and non-structural protein (NS) segments of the influenza B genome, and the nucleocapsid genes of RSV A and RSV B.     Positive results are indicative of the presence  of Flu A, Flu B, RSV, and/or SARS-CoV-2 RNA. Positive results for SARS-CoV-2 or suspected novel influenza should be reported to state, local, or federal health departments according to local reporting requirements.      All results should be assessed in conjunction with clinical presentation and other laboratory markers for clinical management.     FOR PEDIATRIC PATIENTS - copy/paste COVID Guidelines URL to browser: [email or web address]       B-Type Natriuretic Peptide(BNP) [[id number]]  (Abnormal) Collected: 09/01/24 0351    Lab Status: Final result Specimen: Blood from Arm, Left Updated: 09/01/24 0429      pg/mL     HS Troponin 0hr (reflex protocol) [[id number]]  (Abnormal) Collected: 09/01/24 0351    Lab Status: Final result Specimen: Blood from Arm, Left Updated: 09/01/24 0422     hs TnI 0hr 54 ng/L     HS Troponin I 4hr [[id number]]     Lab Status: No result Specimen: Blood     Comprehensive metabolic panel [[id number]]  (Abnormal) Collected: 09/01/24 0351    Lab Status: Final result Specimen: Blood from Arm, Left Updated: 09/01/24 0418     Sodium 135 mmol/L      Potassium 3.6 mmol/L      Chloride 101 mmol/L      CO2 24 mmol/L      ANION GAP 10 mmol/L      BUN 31 mg/dL      Creatinine 1.57 mg/dL      Glucose 391 mg/dL      Calcium 9.2 mg/dL      AST 16 U/L      ALT 15 U/L      Alkaline Phosphatase 124 U/L      Total Protein 8.4 g/dL      Albumin 3.7 g/dL      Total Bilirubin 0.45 mg/dL      eGFR 48 ml/min/1.73sq m     Narrative:      National Kidney Disease Foundation guidelines for Chronic Kidney Disease (CKD):     Stage 1 with normal or high GFR (GFR > 90 mL/min/1.73 square meters)    Stage 2 Mild CKD (GFR = 60-89 mL/min/1.73 square meters)    Stage 3A Moderate CKD (GFR = 45-59 mL/min/1.73 square meters)    Stage 3B Moderate CKD (GFR = 30-44 mL/min/1.73 square meters)    Stage 4 Severe CKD (GFR = 15-29 mL/min/1.73 square meters)    Stage 5 End Stage CKD  (GFR <15 mL/min/1.73 square meters)  Note: GFR calculation is accurate only with a steady state creatinine    CBC and differential [[id number]]  (Abnormal) Collected: 09/01/24 0351    Lab Status: Final result Specimen: Blood from Arm, Left Updated: 09/01/24 0400     WBC 17.45 Thousand/uL      RBC 5.08 Million/uL      Hemoglobin 13.8 g/dL      Hematocrit 43.4 %      MCV 85 fL      MCH 27.2 pg      MCHC 31.8 g/dL      RDW 13.7 %      MPV 10.5 fL      Platelets 423 Thousands/uL      nRBC 0 /100 WBCs      Segmented % 60 %      Immature Grans % 1 %      Lymphocytes % 27 %      Monocytes % 8 %      Eosinophils Relative 3 %      Basophils Relative 1 %      Absolute Neutrophils 10.62 Thousands/µL      Absolute Immature Grans 0.21 Thousand/uL      Absolute Lymphocytes 4.73 Thousands/µL      Absolute Monocytes 1.31 Thousand/µL      Eosinophils Absolute 0.47 Thousand/µL      Basophils Absolute 0.11 Thousands/µL                    CTA ED chest PE Study   Final Result by Justyn Chawla DO (09/01 0507)   Diffuse bilateral multifocal groundglass opacities throughout the lungs most consistent with multifocal infectious process. Early ARDS can have a similar appearance. Clinical correlation is advised   No central or segmental pulmonary embolus.                  Workstation performed: [id number]         XR chest 1 view portable   ED Interpretation by Nia Moreno PA-C (09/01 0406)   Mild pulmonary edema                 Procedures  ECG 12 Lead Documentation Only    Date/Time: 9/1/2024 3:47 AM    Performed by: Nia Moreno PA-C  Authorized by: Nia Moreno PA-C    Indications / Diagnosis:  Cardiac work-up  ECG reviewed by me, the ED Provider: yes    Patient location:  ED  Interpretation:     Interpretation: abnormal    Rate:     ECG rate:  126    ECG rate assessment: tachycardic    Rhythm:     Rhythm: sinus tachycardia    ST segments:     ST segments:  Normal  T waves:     T waves: normal             ED Course    "            HEART Risk Score      Flowsheet Row Most Recent Value   Heart Score Risk Calculator    History 1 Filed at: 09/01/2024 0523   ECG 1 Filed at: 09/01/2024 0523   Age 1 Filed at: 09/01/2024 0523   Risk Factors 2 Filed at: 09/01/2024 0523   Troponin 2 Filed at: 09/01/2024 0523   HEART Score 7 Filed at: 09/01/2024 0523                          SBIRT 20yo+      Flowsheet Row Most Recent Value   Initial Alcohol Screen: US AUDIT-C     1. How often do you have a drink containing alcohol? 1 Filed at: 09/01/2024 0349   2. How many drinks containing alcohol do you have on a typical day you are drinking?  0 Filed at: 09/01/2024 0349   3a. Male UNDER 65: How often do you have five or more drinks on one occasion? 0 Filed at: 09/01/2024 0349   Audit-C Score 1 Filed at: 09/01/2024 0349            Wells' Criteria for PE      Flowsheet Row Most Recent Value   Wells' Criteria for PE    Clinical signs and symptoms of DVT 0 Filed at: 09/01/2024 0402   PE is primary diagnosis or equally likely 3 Filed at: 09/01/2024 0402   HR >100 1.5 Filed at: 09/01/2024 0402   Immobilization at least 3 days or Surgery in the previous 4 weeks 0 Filed at: 09/01/2024 0402   Previous, objectively diagnosed PE or DVT 0 Filed at: 09/01/2024 0402   Hemoptysis 0 Filed at: 09/01/2024 0402   Malignancy with treatment within 6 months or palliative 0 Filed at: 09/01/2024 0402   Wells' Criteria Total 4.5 Filed at: 09/01/2024 0402                  Medical Decision Making  On my initial evaluation, patient appears uncomfortable, ill-appearing, diaphoretic, tachycardic and tachypneic with accessory muscle use. Rales and rhonchi heard throughout lung fields. Sinus tachycardia on EKG with 0-hour troponin 54. Leukocytosis, 17.45. Elevated BNP, 655. Patient on BiPAP in the ED. CT scan showing, \"IMPRESSION:  Diffuse bilateral multifocal groundglass opacities throughout the lungs most consistent with multifocal infectious process. Early ARDS can have a similar " "appearance. Clinical correlation is advised  No central or segmental pulmonary embolus\".  Awaiting Pro-Mono, blood cultures and lactic acid.  Started IV ceftriaxone in the ED to cover for infectious etiology.  IV Lasix given in the ED.  Awaiting ABG.  Due to presentation, lab results and CT findings concerning for early ARDS, discussed with ICU.  Patient admitted to stepdown 1.    Amount and/or Complexity of Data Reviewed  Labs: ordered.  Radiology: ordered and independent interpretation performed.    Risk  Prescription drug management.  Decision regarding hospitalization.                 Disposition  Final diagnoses:   Hypoxia   Productive cough   CHF (congestive heart failure) (HCC)   SIRS (systemic inflammatory response syndrome) (HCC)     Time reflects when diagnosis was documented in both MDM as applicable and the Disposition within this note       Time User Action Codes Description Comment    9/1/2024  5:58 AM Nia Moreno [R09.02] Hypoxia     9/1/2024  5:58 AM Nia Moreno [R05.8] Productive cough     9/1/2024  5:59 AM Nia Moreno [I50.9] CHF (congestive heart failure) (HCC)     9/1/2024  5:59 AM Nia Moreno [R65.10] SIRS (systemic inflammatory response syndrome) ([facility])           ED Disposition       ED Disposition   Admit    Condition   Stable    Date/Time   Sun Sep 1, 2024 0475    Comment   Case was discussed with CHERIE Ortega and the patient's admission status was agreed to be Admission Status: inpatient status to the service of Dr. Sasha Terrell .               Follow-up Information    None         Patient's Medications   Discharge Prescriptions    No medications on file       No discharge procedures on file.    PDMP Review       None            ED Provider  Electronically Signed by             Nia Moreno PA-C  09/01/24 0615    "

## 2024-09-01 NOTE — ASSESSMENT & PLAN NOTE
Noted to have moderate aortic stenosis with fused valve on echocardiogram in 2023  Patient has been discussing valve replacement with his outpatient cardiology   Has had several admissions for heart failure exacerbation

## 2024-09-01 NOTE — RESPIRATORY THERAPY NOTE
09/01/24 0511   Respiratory Assessment   Assessment Type Assess only   General Appearance Awake;Alert   Respiratory Pattern Dyspnea at rest   Chest Assessment Chest expansion symmetrical   Bilateral Breath Sounds Coarse;Crackles   Resp Comments patient placed on BiPAP for resp distress and increased WOB   O2 Device V60   Non-Invasive Information   O2 Interface Device Face mask   Non-Invasive Ventilation Mode BiPAP   $ Continous NIV Initial   $ Pulse Oximetry Spot Check Charge Completed   Non-Invasive Settings   IPAP (cm) 14 cm   EPAP (cm) 8 cm   Rate (Set) 8   FiO2 (%) 40   Pressure Support (cm H2O) 6   Rise Time 2   Trigger Sensitivity Flow (lpm) 2   Non-Invasive Readings   Skin Intervention Skin intact   Total Rate 25   MV (Mech) 21.1   Peak Pressure (Obs) 16   Spontaneous Vt (mL) 839   Leak (lpm) 34   Non-Invasive Alarms   Insp Pressure High (cm H20) 30   Insp Pressure Low (cm H20) 5   Low Insp Pressure Time (sec) 20 sec   MV Low (L/min) 3   Vt High (mL) 1600   Vt Low (mL) 200   High Resp Rate (BPM) 50 BPM   Low Resp Rate (BPM) 8 BPM

## 2024-09-01 NOTE — CONSULTS
Consultation - Cardiology   Zain Gayle 59 y.o. male MRN: [id number]  Unit/Bed#: ICU 12 Encounter: [id number]  09/01/24  11:02 AM    Assessment/ Plan:  Assessment & Plan  Acute exacerbation of CHF (congestive heart failure) (HCC)  Wt Readings from Last 3 Encounters:   09/01/24 97.5 kg (215 lb)   10/05/21 104 kg (229 lb)   Responded well to Lasix 20 mg IV on admission.  Patient now appears euvolemic  Transition to patient's home dose of Lasix 40 mg p.o. daily.  Acute respiratory failure with hypoxia (HCC)  S/p BiPAP and IV Lasix.  Significantly improved  CT scan concerning for respiratory illness as well, on IV abx, management per primary team  Aortic valve stenosis  Noted to have probably bicuspid AV with fusion of the right and left cusps, moderate stenosis  TTE done, report pending  ODALYS (acute kidney injury) (HCC)  Creatinine 1.57  Essential hypertension  Continue metoprolol succinate 50 mg daily.  Type 2 diabetes mellitus with hyperglycemia (formerly [facility])  Lab Results   Component Value Date    HGBA1C 9.0 (H) 10/15/2023       Recent Labs     09/01/24  0722 09/01/24  1042   POCGLU 500* 421*       Blood Sugar Average: Last 72 hrs:  (P) 460.5    Bilateral pressure ulcer of feet          History of Present Illness   Physician Requesting Consult: Sasha Terrell DO    Reason for Consult / Principal Problem: CHF    HPI: Zain Gayle is a 59 y.o. year old male with PMH of CAD, chronic HFpEF, diabetes, hypertension, bilateral foot ulcerations, moderate aortic stenosis/bicuspid aortic valve who presents with shortness of breath. Patient woke up with shortness of breath and called EMS. He has had multiple admission for CHF recently. Patient denies fevers , chills, or known sick contacts. Patient reports adherence to medication.     Upon further evaluation in the ED, patient had CTA which showed diffuse bilateral multifold call groundglass opacities throughout the lungs most consistent with multifocal infectious process.  Patient  also noted to have ODALYS with creatinine of 1.57, , high-sensitivity troponin 54/105/480 and WBC 17.45    [facility]. Patient had cath in 2022 which showed patent stents to circumflex and RCA.     Patient notes that he was due to TTE in September. He was told that he would likely need aortic valve replaced in the next year/year and a half.     Inpatient consult to Cardiology  Consult performed by: CHERIE Weeks  Consult ordered by: CHERIE Bates        EKG: Sinus tachycardia  Nonspecific ST and T wave abnormality      Review of Systems   Constitutional:  Negative for chills, diaphoresis and fever.   Respiratory:  Positive for shortness of breath. Negative for cough and chest tightness.    Cardiovascular:  Negative for chest pain, palpitations and leg swelling.   Gastrointestinal:  Negative for abdominal distention, blood in stool, nausea and vomiting.   Genitourinary:  Negative for difficulty urinating.   Musculoskeletal:  Negative for arthralgias and back pain.   Neurological:  Negative for dizziness, syncope, light-headedness and headaches.   Psychiatric/Behavioral:  Negative for agitation and confusion. The patient is not nervous/anxious.        Historical Information   Past Medical History:   Diagnosis Date    Allergic     Arthritis     Diabetes mellitus (HCC)     Hypertension     MRSA exposure      Past Surgical History:   Procedure Laterality Date    CARPAL TUNNEL RELEASE      KNEE SURGERY      TOE AMPUTATION       Social History     Substance and Sexual Activity   Alcohol Use Not Currently     Social History     Substance and Sexual Activity   Drug Use Not on file     Social History     Tobacco Use   Smoking Status Former   Smokeless Tobacco Never       Family History:   Family History   Problem Relation Age of Onset    Heart disease Mother     Heart disease Father     Cancer Father        Meds/Allergies   all current active meds have been reviewed and current  "meds:   Current Facility-Administered Medications   Medication Dose Route Frequency    amLODIPine (NORVASC) tablet 10 mg  10 mg Oral Daily    aspirin (ECOTRIN LOW STRENGTH) EC tablet 81 mg  81 mg Oral Daily    atorvastatin (LIPITOR) tablet 40 mg  40 mg Oral Daily    [START ON 2024] cefTRIAXone (ROCEPHIN) 1,000 mg in dextrose 5 % 50 mL IVPB  1,000 mg Intravenous Q24H    chlorhexidine (PERIDEX) 0.12 % oral rinse 15 mL  15 mL Mouth/Throat Q12H CHRISTIANO    furosemide (LASIX) tablet 40 mg  40 mg Oral Daily    heparin (porcine) subcutaneous injection 5,000 Units  5,000 Units Subcutaneous Q8H CHRISTIANO    insulin lispro (HumALOG/ADMELOG) 100 units/mL subcutaneous injection 1-6 Units  1-6 Units Subcutaneous HS    insulin lispro (HumALOG/ADMELOG) 100 units/mL subcutaneous injection 2-12 Units  2-12 Units Subcutaneous TID AC    lisinopril (ZESTRIL) tablet 20 mg  20 mg Oral Daily    metoprolol succinate (TOPROL-XL) 24 hr tablet 50 mg  50 mg Oral Daily    ondansetron (ZOFRAN) injection 4 mg  4 mg Intravenous Q6H PRN    pantoprazole (PROTONIX) EC tablet 40 mg  40 mg Oral Early Morning    vancomycin (VANCOCIN) IVPB (premix in dextrose) 750 mg 150 mL  750 mg Intravenous Q12H     No Known Allergies    Objective   Vitals: Blood pressure 134/75, pulse 78, temperature 98.3 °F (36.8 °C), resp. rate (!) 29, height 5' 11\" (1.803 m), weight 97.5 kg (215 lb), SpO2 96%., Body mass index is 29.99 kg/m².,   Orthostatic Blood Pressures      Flowsheet Row Most Recent Value   Blood Pressure 134/75 filed at 2024 0823   Patient Position - Orthostatic VS Sitting filed at 2024 0345            Systolic (24hrs), Av , Min:114 , Max:171     Diastolic (24hrs), Av, Min:61, Max:101        Intake/Output Summary (Last 24 hours) at 2024 1102  Last data filed at 2024 0801  Gross per 24 hour   Intake 480 ml   Output 400 ml   Net 80 ml       Invasive Devices       Peripheral Intravenous Line  Duration             Peripheral IV 24 " Distal;Left;Upper;Ventral (anterior) Arm <1 day    Peripheral IV 09/01/24 Dorsal (posterior);Right Forearm <1 day                        Physical Exam:  Physical Exam  Vitals and nursing note reviewed.   Constitutional:       General: He is not in acute distress.     Appearance: He is well-developed.   HENT:      Head: Normocephalic and atraumatic.   Eyes:      Conjunctiva/sclera: Conjunctivae normal.   Neck:      Vascular: No carotid bruit or JVD.   Cardiovascular:      Rate and Rhythm: Normal rate and regular rhythm.      Heart sounds: Murmur heard.      Systolic murmur is present.   Pulmonary:      Effort: Pulmonary effort is normal. No respiratory distress.      Breath sounds: Normal breath sounds.   Abdominal:      Palpations: Abdomen is soft.      Tenderness: There is no abdominal tenderness.   Musculoskeletal:         General: No swelling.      Cervical back: Neck supple.      Right lower leg: No edema.      Left lower leg: No edema.   Skin:     General: Skin is warm and dry.      Capillary Refill: Capillary refill takes less than 2 seconds.   Neurological:      Mental Status: He is alert and oriented to person, place, and time.   Psychiatric:         Mood and Affect: Mood normal.        Lab Results:     Cardiac Profile:   Results from last 7 days   Lab Units 09/01/24  0800 09/01/24  0547 09/01/24  0351   HS TNI 0HR ng/L  --   --  54*   HS TNI 2HR ng/L  --  105*  --    HSTNI D2 ng/L  --  51*  --    HS TNI 4HR ng/L 480*  --   --    HSTNI D4 ng/L 426*  --   --        CBC with diff:   Results from last 7 days   Lab Units 09/01/24  0756 09/01/24  0351   WBC Thousand/uL  --  17.45*   HEMOGLOBIN g/dL  --  13.8   HEMATOCRIT %  --  43.4   MCV fL  --  85   PLATELETS Thousands/uL 286 423*   RBC Million/uL  --  5.08   MCH pg  --  27.2   MCHC g/dL  --  31.8   RDW %  --  13.7   MPV fL 10.7 10.5   NRBC AUTO /100 WBCs  --  0         CMP:   Results from last 7 days   Lab Units 09/01/24  0351   POTASSIUM mmol/L 3.6   CHLORIDE  mmol/L 101   CO2 mmol/L 24   BUN mg/dL 31*   CREATININE mg/dL 1.57*   CALCIUM mg/dL 9.2   AST U/L 16   ALT U/L 15   ALK PHOS U/L 124*   EGFR ml/min/1.73sq m 48

## 2024-09-01 NOTE — ASSESSMENT & PLAN NOTE
Wt Readings from Last 3 Encounters:   09/01/24 97.7 kg (215 lb 6.2 oz)   10/05/21 104 kg (229 lb)     Presented with acute onset shortness of breath  CT negative for PE with overall appearance of volume overload  Clinically, he appears fairly euvolemic  Rapid improvement in respiratory symptoms with diuretic challenge   Has history of moderate aortic stenosis  Patient states that he has been discussing valve replacement options with his cardiologist  Will repeat echocardiogram to evaluate for advanced stenosis   Consult to cardiology   Will continue lasix as needed to maintain negative fluid balance

## 2024-09-01 NOTE — PROGRESS NOTES
Zain Gayle is a 59 y.o. male who is currently ordered Vancomycin IV with management by the Pharmacy Consult service.  Relevant clinical data and objective / subjective history reviewed.  Vancomycin Assessment:  Indication and Goal AUC/Trough: Soft tissue (goal -600, trough >10); Bone/joint infection (goal -600, trough >10)  Clinical Status: new admission  Micro:     Renal Function:  SCr: 1.57 mg/dL  CrCl: 61.9 mL/min  Renal replacement: Not on dialysis  Days of Therapy: 1  Current Dose: 1500mg IV q24h  Vancomycin Plan:  New Dosinmg IV q12h  Estimated AUC: 448 mcg*hr/mL  Estimated Trough: 14.4 mcg/mL  Next Level: 9/3  Renal Function Monitoring: Daily BMP and UOP  Pharmacy will continue to follow closely for s/sx of nephrotoxicity, infusion reactions and appropriateness of therapy.  BMP and CBC will be ordered per protocol. We will continue to follow the patient’s culture results and clinical progress daily.    Aida Berg, Pharmacist

## 2024-09-01 NOTE — ASSESSMENT & PLAN NOTE
Likely secondary to CHF exacerbation   CT negative for PE with overall appearance of volume overload  Required BIPAP for a short period of time   Rapid improvement in respiratory status with 20 mg IV lasix  Will continue with PRN IV lasix to maintain negative fluid balance   Can restart home dose lasix tomorrow likely   Continue with traditional nasal cannula   Early mobilization   Incentive spirometry

## 2024-09-01 NOTE — SEPSIS NOTE
"  Sepsis Note   Zain Gayle 59 y.o. male MRN: [id number]  Unit/Bed#: ICU 12 Encounter: [id number]       Initial Sepsis Screening       Row Name 09/01/24 0655                Is the patient's history suggestive of a new or worsening infection? Yes (Proceed)  -KS        Suspected source of infection soft tissue  -KS        Indicate SIRS criteria Leukocytosis (WBC > 07585 IJL) OR Leukopenia (WBC <4000 IJL) OR Bandemia (WBC >10% bands);Tachypnea > 20 resp per min;Tachycardia > 90 bpm  -KS        Are two or more of the above signs & symptoms of infection both present and new to the patient? Yes (Proceed)  -KS        Assess for evidence of organ dysfunction: Are any of the below criteria present within 6 hours of suspected infection and SIRS criteria that are NOT considered to be chronic conditions? Creatinine > 2.0 AND > 0.5 above baseline;New need for invasive/non-invasive ventilation  -KS        Date of presentation of severe sepsis 09/01/24  -KS        Time of presentation of severe sepsis 0656  -KS        Sepsis Note: Click \"NEXT\" below (NOT \"close\") to generate sepsis note based on above information. YES (proceed by clicking \"NEXT\")  -KS                  User Key  (r) = Recorded By, (t) = Taken By, (c) = Cosigned By      Initials Name Provider Type    CHERIE Conroy Nurse Practitioner                        Body mass index is 30.04 kg/m².  Wt Readings from Last 1 Encounters:   09/01/24 97.7 kg (215 lb 6.2 oz)     IBW (Ideal Body Weight): 75.3 kg    Ideal body weight: 75.3 kg (166 lb 0.1 oz)  Adjusted ideal body weight: 84.3 kg (185 lb 12.2 oz)    "

## 2024-09-02 LAB
ALBUMIN SERPL BCG-MCNC: 3.1 G/DL (ref 3.5–5)
ALP SERPL-CCNC: 95 U/L (ref 34–104)
ALT SERPL W P-5'-P-CCNC: 13 U/L (ref 7–52)
ANION GAP SERPL CALCULATED.3IONS-SCNC: 7 MMOL/L (ref 4–13)
AST SERPL W P-5'-P-CCNC: 13 U/L (ref 13–39)
ATRIAL RATE: 59 BPM
BASOPHILS # BLD AUTO: 0.03 THOUSANDS/ÂΜL (ref 0–0.1)
BASOPHILS NFR BLD AUTO: 0 % (ref 0–1)
BILIRUB SERPL-MCNC: 0.39 MG/DL (ref 0.2–1)
BUN SERPL-MCNC: 34 MG/DL (ref 5–25)
CA-I BLD-SCNC: 1.12 MMOL/L (ref 1.12–1.32)
CALCIUM ALBUM COR SERPL-MCNC: 9.3 MG/DL (ref 8.3–10.1)
CALCIUM SERPL-MCNC: 8.6 MG/DL (ref 8.4–10.2)
CARDIAC TROPONIN I PNL SERPL HS: 1045 NG/L (ref 8–18)
CHLORIDE SERPL-SCNC: 100 MMOL/L (ref 96–108)
CO2 SERPL-SCNC: 25 MMOL/L (ref 21–32)
CREAT SERPL-MCNC: 1.05 MG/DL (ref 0.6–1.3)
EOSINOPHIL # BLD AUTO: 0.01 THOUSAND/ÂΜL (ref 0–0.61)
EOSINOPHIL NFR BLD AUTO: 0 % (ref 0–6)
ERYTHROCYTE [DISTWIDTH] IN BLOOD BY AUTOMATED COUNT: 13.8 % (ref 11.6–15.1)
GFR SERPL CREATININE-BSD FRML MDRD: 78 ML/MIN/1.73SQ M
GLUCOSE SERPL-MCNC: 212 MG/DL (ref 65–140)
GLUCOSE SERPL-MCNC: 309 MG/DL (ref 65–140)
GLUCOSE SERPL-MCNC: 324 MG/DL (ref 65–140)
GLUCOSE SERPL-MCNC: 330 MG/DL (ref 65–140)
GLUCOSE SERPL-MCNC: 375 MG/DL (ref 65–140)
HCT VFR BLD AUTO: 35.3 % (ref 36.5–49.3)
HGB BLD-MCNC: 11.5 G/DL (ref 12–17)
IMM GRANULOCYTES # BLD AUTO: 0.15 THOUSAND/UL (ref 0–0.2)
IMM GRANULOCYTES NFR BLD AUTO: 1 % (ref 0–2)
LYMPHOCYTES # BLD AUTO: 1.12 THOUSANDS/ÂΜL (ref 0.6–4.47)
LYMPHOCYTES NFR BLD AUTO: 7 % (ref 14–44)
MAGNESIUM SERPL-MCNC: 1.9 MG/DL (ref 1.9–2.7)
MCH RBC QN AUTO: 27.4 PG (ref 26.8–34.3)
MCHC RBC AUTO-ENTMCNC: 32.6 G/DL (ref 31.4–37.4)
MCV RBC AUTO: 84 FL (ref 82–98)
MONOCYTES # BLD AUTO: 0.63 THOUSAND/ÂΜL (ref 0.17–1.22)
MONOCYTES NFR BLD AUTO: 4 % (ref 4–12)
MRSA NOSE QL CULT: NORMAL
NEUTROPHILS # BLD AUTO: 14.78 THOUSANDS/ÂΜL (ref 1.85–7.62)
NEUTS SEG NFR BLD AUTO: 88 % (ref 43–75)
NRBC BLD AUTO-RTO: 0 /100 WBCS
P AXIS: 57 DEGREES
PHOSPHATE SERPL-MCNC: 3 MG/DL (ref 2.7–4.5)
PLATELET # BLD AUTO: 286 THOUSANDS/UL (ref 149–390)
PMV BLD AUTO: 11.2 FL (ref 8.9–12.7)
POTASSIUM SERPL-SCNC: 4.2 MMOL/L (ref 3.5–5.3)
PR INTERVAL: 180 MS
PROCALCITONIN SERPL-MCNC: 0.24 NG/ML
PROT SERPL-MCNC: 6.9 G/DL (ref 6.4–8.4)
QRS AXIS: 56 DEGREES
QRSD INTERVAL: 94 MS
QT INTERVAL: 460 MS
QTC INTERVAL: 455 MS
RBC # BLD AUTO: 4.2 MILLION/UL (ref 3.88–5.62)
SODIUM SERPL-SCNC: 132 MMOL/L (ref 135–147)
T WAVE AXIS: 113 DEGREES
VENTRICULAR RATE: 59 BPM
WBC # BLD AUTO: 16.72 THOUSAND/UL (ref 4.31–10.16)

## 2024-09-02 PROCEDURE — 99232 SBSQ HOSP IP/OBS MODERATE 35: CPT | Performed by: STUDENT IN AN ORGANIZED HEALTH CARE EDUCATION/TRAINING PROGRAM

## 2024-09-02 PROCEDURE — 84145 PROCALCITONIN (PCT): CPT | Performed by: NURSE PRACTITIONER

## 2024-09-02 PROCEDURE — 84484 ASSAY OF TROPONIN QUANT: CPT | Performed by: STUDENT IN AN ORGANIZED HEALTH CARE EDUCATION/TRAINING PROGRAM

## 2024-09-02 PROCEDURE — 85025 COMPLETE CBC W/AUTO DIFF WBC: CPT | Performed by: NURSE PRACTITIONER

## 2024-09-02 PROCEDURE — 82330 ASSAY OF CALCIUM: CPT | Performed by: NURSE PRACTITIONER

## 2024-09-02 PROCEDURE — 82948 REAGENT STRIP/BLOOD GLUCOSE: CPT

## 2024-09-02 PROCEDURE — 93005 ELECTROCARDIOGRAM TRACING: CPT

## 2024-09-02 PROCEDURE — 84100 ASSAY OF PHOSPHORUS: CPT | Performed by: NURSE PRACTITIONER

## 2024-09-02 PROCEDURE — 93010 ELECTROCARDIOGRAM REPORT: CPT | Performed by: STUDENT IN AN ORGANIZED HEALTH CARE EDUCATION/TRAINING PROGRAM

## 2024-09-02 PROCEDURE — 99233 SBSQ HOSP IP/OBS HIGH 50: CPT | Performed by: STUDENT IN AN ORGANIZED HEALTH CARE EDUCATION/TRAINING PROGRAM

## 2024-09-02 PROCEDURE — 83735 ASSAY OF MAGNESIUM: CPT | Performed by: NURSE PRACTITIONER

## 2024-09-02 PROCEDURE — 80053 COMPREHEN METABOLIC PANEL: CPT | Performed by: NURSE PRACTITIONER

## 2024-09-02 RX ORDER — VANCOMYCIN HYDROCHLORIDE 1 G/200ML
1000 INJECTION, SOLUTION INTRAVENOUS EVERY 12 HOURS
Status: DISCONTINUED | OUTPATIENT
Start: 2024-09-02 | End: 2024-09-04

## 2024-09-02 RX ORDER — INSULIN GLARGINE 100 [IU]/ML
15 INJECTION, SOLUTION SUBCUTANEOUS
Status: DISCONTINUED | OUTPATIENT
Start: 2024-09-02 | End: 2024-09-10

## 2024-09-02 RX ORDER — MAGNESIUM SULFATE HEPTAHYDRATE 40 MG/ML
2 INJECTION, SOLUTION INTRAVENOUS ONCE
Status: COMPLETED | OUTPATIENT
Start: 2024-09-02 | End: 2024-09-02

## 2024-09-02 RX ADMIN — CEFTRIAXONE SODIUM 1000 MG: 10 INJECTION, POWDER, FOR SOLUTION INTRAVENOUS at 05:44

## 2024-09-02 RX ADMIN — LISINOPRIL 20 MG: 20 TABLET ORAL at 08:06

## 2024-09-02 RX ADMIN — INSULIN LISPRO 8 UNITS: 100 INJECTION, SOLUTION INTRAVENOUS; SUBCUTANEOUS at 08:06

## 2024-09-02 RX ADMIN — AMLODIPINE BESYLATE 10 MG: 10 TABLET ORAL at 08:06

## 2024-09-02 RX ADMIN — INSULIN LISPRO 8 UNITS: 100 INJECTION, SOLUTION INTRAVENOUS; SUBCUTANEOUS at 11:18

## 2024-09-02 RX ADMIN — INSULIN LISPRO 5 UNITS: 100 INJECTION, SOLUTION INTRAVENOUS; SUBCUTANEOUS at 21:07

## 2024-09-02 RX ADMIN — METOPROLOL SUCCINATE 50 MG: 50 TABLET, EXTENDED RELEASE ORAL at 08:06

## 2024-09-02 RX ADMIN — CHLORHEXIDINE GLUCONATE 0.12% ORAL RINSE 15 ML: 1.2 LIQUID ORAL at 08:06

## 2024-09-02 RX ADMIN — PANTOPRAZOLE SODIUM 40 MG: 40 TABLET, DELAYED RELEASE ORAL at 05:44

## 2024-09-02 RX ADMIN — ASPIRIN 81 MG: 81 TABLET, COATED ORAL at 08:06

## 2024-09-02 RX ADMIN — INSULIN GLARGINE 15 UNITS: 100 INJECTION, SOLUTION SUBCUTANEOUS at 21:09

## 2024-09-02 RX ADMIN — MAGNESIUM SULFATE HEPTAHYDRATE 2 G: 40 INJECTION, SOLUTION INTRAVENOUS at 11:18

## 2024-09-02 RX ADMIN — VANCOMYCIN HYDROCHLORIDE 750 MG: 750 INJECTION, SOLUTION INTRAVENOUS at 08:17

## 2024-09-02 RX ADMIN — HEPARIN SODIUM 5000 UNITS: 5000 INJECTION INTRAVENOUS; SUBCUTANEOUS at 15:11

## 2024-09-02 RX ADMIN — INSULIN LISPRO 4 UNITS: 100 INJECTION, SOLUTION INTRAVENOUS; SUBCUTANEOUS at 17:19

## 2024-09-02 RX ADMIN — VANCOMYCIN HYDROCHLORIDE 1000 MG: 1 INJECTION, SOLUTION INTRAVENOUS at 18:34

## 2024-09-02 RX ADMIN — ATORVASTATIN CALCIUM 40 MG: 40 TABLET, FILM COATED ORAL at 08:06

## 2024-09-02 RX ADMIN — HEPARIN SODIUM 5000 UNITS: 5000 INJECTION INTRAVENOUS; SUBCUTANEOUS at 05:44

## 2024-09-02 RX ADMIN — FUROSEMIDE 40 MG: 40 TABLET ORAL at 08:06

## 2024-09-02 RX ADMIN — HEPARIN SODIUM 5000 UNITS: 5000 INJECTION INTRAVENOUS; SUBCUTANEOUS at 21:09

## 2024-09-02 NOTE — ASSESSMENT & PLAN NOTE
Lab Results   Component Value Date    HGBA1C 9.8 (H) 09/01/2024       Recent Labs     09/01/24  1546 09/01/24  2048 09/02/24  0723 09/02/24  1102   POCGLU 309* 361* 324* 309*       Blood Sugar Average: Last 72 hrs:  (P) 370.5330850493572245  Glucose has been poorly controlled so far.  Repeat A1c pending  Add 15 units Lantus, continue sliding scale for now.  Will likely need a true basal/bolus regimen

## 2024-09-02 NOTE — ASSESSMENT & PLAN NOTE
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  Continue with vancomycin for now  Podiatry consult-followed previously by podiatrist at [facility], has not established with one in this area

## 2024-09-02 NOTE — OCCUPATIONAL THERAPY NOTE
Occupational Therapy Cancellation Note        Patient Name: Zain Gayle  Today's Date: 9/2/2024 09/02/24 0745   OT Last Visit   OT Visit Date 09/02/24   Note Type   Note type Evaluation;Cancelled Session   Additional Comments OT orders received.  Chart received performed. Pt is pending a podiatry consult and the read on his foot xrays.  OT will hold for today and will continue to follow and reattempt when appropriate.     Emmy Aparicio MS OTR/L

## 2024-09-02 NOTE — ASSESSMENT & PLAN NOTE
Likely secondary to CHF exacerbation   CT negative for PE with overall appearance of volume overload  Required BIPAP for a short period of time   Weaned to room air

## 2024-09-02 NOTE — ASSESSMENT & PLAN NOTE
Noted to have moderate aortic stenosis with fused valve on echocardiogram in 2023  Patient has been discussing valve replacement with his outpatient cardiology   Cardiology following, echo here with moderate/severe AS

## 2024-09-02 NOTE — PROGRESS NOTES
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  Continue with vancomycin for now  Podiatry consult-followed previously by podiatrist at [facility], has not established with one in this area    Type 2 diabetes mellitus with hyperglycemia (HCC)  Assessment & Plan  Lab Results   Component Value Date    HGBA1C 9.8 (H) 09/01/2024       Recent Labs     09/01/24  1546 09/01/24  2048 09/02/24  0723 09/02/24  1102   POCGLU 309* 361* 324* 309*       Blood Sugar Average: Last 72 hrs:  (P) 370.1859729475260416  Glucose has been poorly controlled so far.  Repeat A1c pending  Add 15 units Lantus, continue sliding scale for now.  Will likely need a true basal/bolus regimen    Gastroesophageal reflux disease without esophagitis  Assessment & Plan  Continue with PPI     Essential hypertension  Assessment & Plan  Will continue with home dose antihypertensives     Elevated troponin  Assessment & Plan  Trop >1200, suspect nonischemic myocardial injury but in setting of mod/severe AS may need to consider additional testing   Cardiology following-he will need repeat limited echo with Definity contrast to assess for wall motion abnormalities    Aortic valve stenosis  Assessment & Plan  Noted to have moderate aortic stenosis with fused valve on echocardiogram in 2023  Patient has been discussing valve replacement with his outpatient cardiology   Cardiology following, echo here with moderate/severe AS    ODALYS (acute kidney injury) (HCC)  Assessment & Plan  Baseline crt  1.1-1.25  Presenting with crt 1.57  Likely secondary to chf exacerbation   Now resolved    Acute exacerbation of CHF (congestive heart failure) (HCC)  Assessment & Plan  Wt Readings from Last 3 Encounters:   09/02/24 101 kg (222 lb 14.2 oz)   10/05/21 104 kg (229 lb)     Presented with acute onset shortness of breath  CT negative for PE with overall appearance of volume overload  Clinically, he appears fairly euvolemic  Rapid improvement in respiratory symptoms with diuretic challenge   Cardiology following, appreciate diuretic recommendations    * Acute respiratory failure with hypoxia (HCC)  Assessment & Plan  Likely secondary to CHF exacerbation   CT negative for PE with overall appearance of volume overload  Required BIPAP for a short period of time   Weaned to room air           VTE Pharmacologic Prophylaxis: VTE Score: 7 High Risk (Score >/= 5) - Pharmacological DVT Prophylaxis Ordered: heparin. Sequential Compression Devices Ordered.    Mobility:   Basic Mobility Inpatient Raw Score: 23  JH-HLM Goal: 7: Walk 25 feet or more  JH-HLM Achieved: 5: Stand (1 or more minutes)  JH-HLM Goal NOT achieved. Continue with multidisciplinary rounding and encourage appropriate mobility to improve upon JH-HLM goals.    Patient Centered Rounds: I performed bedside rounds with nursing staff today.   Discussions with Specialists or Other Care Team Provider: Cardiology    Education and Discussions with Family / Patient: Patient declined call to .     Total Time Spent on Date of Encounter in care of patient: 55 mins. This time was spent on one or more of the following: performing physical exam; counseling and coordination of care; obtaining or reviewing history; documenting in the medical record; reviewing/ordering tests, medications or procedures; communicating with other healthcare professionals and discussing with patient's family/caregivers.    Current Length of Stay: 1 day(s)  Current Patient Status: Inpatient    Certification Statement: The patient will continue to require additional inpatient hospital stay due to CHF, bilateral foot wounds/infections, aortic stenosis, DM 2 with hyperglycemia  Discharge Plan: Anticipate discharge in >72 hrs to home.    Code Status: Level 1 - Full Code    Subjective:   Patient reports that he feels better today from a breathing standpoint.  Denies any chest pain or palpitations.    Objective:     Vitals:   Temp (24hrs), Av °F (36.7 °C), Min:97.6 °F (36.4 °C), Max:98.5 °F (36.9 °C)    Temp:  [97.6 °F (36.4 °C)-98.5 °F (36.9 °C)] 97.9 °F (36.6 °C)  HR:  [52-74] 66  Resp:  [16-29] 16  BP: (102-127)/(57-66) 127/64  SpO2:  [92 %-99 %] 94 %  Body mass index is 31.09 kg/m².     Input and Output Summary (last 24 hours):     Intake/Output Summary (Last 24 hours) at 2024 1337  Last data filed at 2024 1250  Gross per 24 hour   Intake 250 ml   Output 3325 ml   Net -3075 ml       Physical Exam:   Physical Exam  Vitals and nursing note reviewed.   Constitutional:       General: He is not in acute distress.     Appearance: He is well-developed.   HENT:      Head: Normocephalic and atraumatic.   Eyes:      Conjunctiva/sclera: Conjunctivae normal.      Pupils: Pupils are equal, round, and reactive to light.   Cardiovascular:      Rate and Rhythm: Normal rate and regular rhythm.      Heart sounds: No murmur heard.  Pulmonary:      Effort: Pulmonary effort is normal. No respiratory distress.      Breath sounds: Normal breath sounds. No wheezing or rales.   Abdominal:      General: Abdomen is flat. Bowel sounds are normal. There is no distension.      Palpations: Abdomen is soft.      Tenderness: There is no abdominal tenderness. There is no guarding or rebound.   Musculoskeletal:         General: Normal range of motion.      Cervical back: Normal range of motion.   Skin:     General: Skin is warm and dry.      Capillary Refill: Capillary refill takes less than 2 seconds.      Comments: Left foot  plantar ulcer with surrounding erythema  Right foot status post 1st-3rd digit amputation.  Dried bloody drainage and plantar ulcer noted.   Neurological:      General: No focal deficit present.      Mental Status: He is alert. Mental status is at baseline.   Psychiatric:         Mood and Affect: Mood normal.          Additional Data:     Labs:  Results from last 7 days   Lab Units 09/02/24  0545   WBC Thousand/uL 16.72*   HEMOGLOBIN g/dL 11.5*   HEMATOCRIT % 35.3*   PLATELETS Thousands/uL 286   SEGS PCT % 88*   LYMPHO PCT % 7*   MONO PCT % 4   EOS PCT % 0     Results from last 7 days   Lab Units 09/02/24  0545   SODIUM mmol/L 132*   POTASSIUM mmol/L 4.2   CHLORIDE mmol/L 100   CO2 mmol/L 25   BUN mg/dL 34*   CREATININE mg/dL 1.05   ANION GAP mmol/L 7   CALCIUM mg/dL 8.6   ALBUMIN g/dL 3.1*   TOTAL BILIRUBIN mg/dL 0.39   ALK PHOS U/L 95   ALT U/L 13   AST U/L 13   GLUCOSE RANDOM mg/dL 375*         Results from last 7 days   Lab Units 09/02/24  1102 09/02/24  0723 09/01/24  2048 09/01/24  1546 09/01/24  1042 09/01/24  0722   POC GLUCOSE mg/dl 309* 324* 361* 309* 421* 500*     Results from last 7 days   Lab Units 09/01/24  0756   HEMOGLOBIN A1C % 9.8*     Results from last 7 days   Lab Units 09/02/24  0545 09/01/24  0547   LACTIC ACID mmol/L  --  1.7   PROCALCITONIN ng/ml 0.24 0.09       Lines/Drains:  Invasive Devices       Peripheral Intravenous Line  Duration             Peripheral IV 09/01/24 Dorsal (posterior);Right Forearm 1 day                      Telemetry:  Telemetry Orders (From admission, onward)               24 Hour Telemetry Monitoring  Continuous x 24 Hours (Telem)        Question:  Reason for 24 Hour Telemetry  Answer:  PCI/EP study (including pacer and ICD implementation), Cardiac surgery, MI, abnormal cardiac cath, and chest pain- rule out MI                     Telemetry Reviewed: Normal Sinus Rhythm  Indication for Continued Telemetry Use: Acute MI/Unstable Angina/Rule out ACS             Imaging:  Reviewed radiology reports from this admission including: chest CT scan and ECHO    Recent Cultures (last 7 days):   Results from last 7 days   Lab Units 09/01/24  0557 09/01/24  0547   BLOOD CULTURE  Received in Microbiology Lab. Culture in Progress. Received in Microbiology Lab. Culture in Progress.       Last 24 Hours Medication List:   Current Facility-Administered Medications   Medication Dose Route Frequency Provider Last Rate    amLODIPine  10 mg Oral Daily Renetta Rojelio, CRNP      aspirin  81 mg Oral Daily Renetta Rojelio, CRNP      atorvastatin  40 mg Oral Daily Renetta Rojelio, CRNP      cefTRIAXone  1,000 mg Intravenous Q24H Renetta Rojelio, CRNP 1,000 mg (09/02/24 0544)    furosemide  40 mg Oral Daily Renetta Rojelio, CRNP      heparin (porcine)  5,000 Units Subcutaneous Q8H CHRISTIANO Renetta Granadoolz, CRNP      insulin glargine  15 Units Subcutaneous HS Sergo Christine MD      insulin lispro  1-6 Units Subcutaneous HS Renetta Serrato, CRNP      insulin lispro  2-12 Units Subcutaneous TID AC Renetta Rojelio, CRNP      lisinopril  20 mg Oral Daily Renetta Rojelio, CRNP      metoprolol succinate  50 mg Oral Daily Renetta Rojelio, CRNP      ondansetron  4 mg Intravenous Q6H PRN Renetta Rojelio, CRNP      pantoprazole  40 mg Oral Early Morning Renetta Rojelio, CRNP      vancomycin  1,000 mg Intravenous Q12H Sergo Christine MD          Today, Patient Was Seen By: Sergo Christine MD    **Please Note: This note may have been constructed using a voice recognition system.**

## 2024-09-02 NOTE — PLAN OF CARE
Problem: PAIN - ADULT  Goal: Verbalizes/displays adequate comfort level or baseline comfort level  Description: Interventions:  - Encourage patient to monitor pain and request assistance  - Assess pain using appropriate pain scale  - Administer analgesics based on type and severity of pain and evaluate response  - Implement non-pharmacological measures as appropriate and evaluate response  - Consider cultural and social influences on pain and pain management  - Notify physician/advanced practitioner if interventions unsuccessful or patient reports new pain  Outcome: Progressing     Problem: INFECTION - ADULT  Goal: Absence or prevention of progression during hospitalization  Description: INTERVENTIONS:  - Assess and monitor for signs and symptoms of infection  - Monitor lab/diagnostic results  - Monitor all insertion sites, i.e. indwelling lines, tubes, and drains  - Monitor endotracheal if appropriate and nasal secretions for changes in amount and color  - [place] appropriate cooling/warming therapies per order  - Administer medications as ordered  - Instruct and encourage patient and family to use good hand hygiene technique  - Identify and instruct in appropriate isolation precautions for identified infection/condition  Outcome: Progressing  Goal: Absence of fever/infection during neutropenic period  Description: INTERVENTIONS:  - Monitor WBC    Outcome: Progressing     Problem: SAFETY ADULT  Goal: Patient will remain free of falls  Description: INTERVENTIONS:  - Educate patient/family on patient safety including physical limitations  - Instruct patient to call for assistance with activity   - Consult OT/PT to assist with strengthening/mobility   - Keep Call bell within reach  - Keep bed low and locked with side rails adjusted as appropriate  - Keep care items and personal belongings within reach  - Initiate and maintain comfort rounds  - Make Fall Risk Sign visible to staff  - Apply yellow socks and bracelet  for high fall risk patients  - Consider moving patient to room near nurses station  Outcome: Progressing  Goal: Maintain or return to baseline ADL function  Description: INTERVENTIONS:  -  Assess patient's ability to carry out ADLs; assess patient's baseline for ADL function and identify physical deficits which impact ability to perform ADLs (bathing, care of mouth/teeth, toileting, grooming, dressing, etc.)  - Assess/evaluate cause of self-care deficits   - Assess range of motion  - Assess patient's mobility; develop plan if impaired  - Assess patient's need for assistive devices and provide as appropriate  - Encourage maximum independence but intervene and supervise when necessary  - Involve family in performance of ADLs  - Assess for home care needs following discharge   - Consider OT consult to assist with ADL evaluation and planning for discharge  - Provide patient education as appropriate  Outcome: Progressing  Goal: Maintains/Returns to pre admission functional level  Description: INTERVENTIONS:  - Perform AM-PAC 6 Click Basic Mobility/ Daily Activity assessment daily.  - Set and communicate daily mobility goal to care team and patient/family/caregiver.   - Collaborate with rehabilitation services on mobility goals if consulted  - Out of bed for toileting  - Record patient progress and toleration of activity level   Outcome: Progressing     Problem: DISCHARGE PLANNING  Goal: Discharge to home or other facility with appropriate resources  Description: INTERVENTIONS:  - Identify barriers to discharge w/patient and caregiver  - Arrange for needed discharge resources and transportation as appropriate  - Identify discharge learning needs (meds, wound care, etc.)  - Arrange for interpretive services to assist at discharge as needed  - Refer to Case Management Department for coordinating discharge planning if the patient needs post-hospital services based on physician/advanced practitioner order or complex needs  related to functional status, cognitive ability, or social support system  Outcome: Progressing     Problem: Knowledge Deficit  Goal: Patient/family/caregiver demonstrates understanding of disease process, treatment plan, medications, and discharge instructions  Description: Complete learning assessment and assess knowledge base.  Interventions:  - Provide teaching at level of understanding  - Provide teaching via preferred learning methods  Outcome: Progressing     Problem: Nutrition/Hydration-ADULT  Goal: Nutrient/Hydration intake appropriate for improving, restoring or maintaining nutritional needs  Description: Monitor and assess patient's nutrition/hydration status for malnutrition. Collaborate with interdisciplinary team and initiate plan and interventions as ordered.  Monitor patient's weight and dietary intake as ordered or per policy. Utilize nutrition screening tool and intervene as necessary. Determine patient's food preferences and provide high-protein, high-caloric foods as appropriate.     INTERVENTIONS:  - Monitor oral intake, urinary output, labs, and treatment plans  - Assess nutrition and hydration status and recommend course of action  - Evaluate amount of meals eaten  - Assist patient with eating if necessary   - Allow adequate time for meals  - Recommend/ encourage appropriate diets, oral nutritional supplements, and vitamin/mineral supplements  - Order, calculate, and assess calorie counts as needed  - Recommend, monitor, and adjust tube feedings and TPN/PPN based on assessed needs  - Assess need for intravenous fluids  - Provide specific nutrition/hydration education as appropriate  - Include patient/family/caregiver in decisions related to nutrition  Outcome: Progressing

## 2024-09-02 NOTE — ASSESSMENT & PLAN NOTE
Wt Readings from Last 3 Encounters:   09/02/24 101 kg (222 lb 14.2 oz)   10/05/21 104 kg (229 lb)     Presented with acute onset shortness of breath  CT negative for PE with overall appearance of volume overload  Clinically, he appears fairly euvolemic  Rapid improvement in respiratory symptoms with diuretic challenge   Cardiology following, appreciate diuretic recommendations

## 2024-09-02 NOTE — PROGRESS NOTES
Progress Note - Cardiology   Zain Gayle 59 y.o. male MRN: [id number]  Unit/Bed#: ICU 12 Encounter: [id number]    Assessment:  59-year-old male with history of CAD status post stents to circumflex and RCA, HFpEF, diabetes, moderate AS, who presents with acute dyspnea and orthopnea found to be volume overloaded requiring BiPAP and diuresis. Patient without any chest pain or anginal symptoms but with troponin elevation to 480 and BNP of 655. Other Labs significant for WBC of 17.45 with CT imaging with findings of b/l GGOs concerning for infection. POCT glucose of >500 (potential steroids?), and ODALYS. With EKG with no obvious ST elevations however baseline artifact makes isoelectric segment difficult to interpret. On bedside exam patient minimally overloaded with MARGOTH with A2 component intact. Concerning R lower leg wound. OF note patient previously followed with [facility] for cardiac care and has since transitioned to [facility]. TTE read by me which was technically difficult study, and our tech was unable to fully visualize the endocardium which made it difficult to assess for wall motion abnormality but the seemingly patient had preserved LVEF. Aortic stenosis Doppler values were on borderline of moderate to severe range.        Plan:  #Moderate-Severe Aortic Stenosis   #Troponin Elevation  #ODALYS  #Acute decompensated HFpEF   #Respiratory failure   #Sepsis with possible RLE infection  -Agree with abx  -Restart Ace-I once creatinine stabilizes   -C/w patient's oral home diuretic  -Would c/t trend troponin to peak, can be 2/2 heart failure with ODALYS or myocardial injury in setting of infection  -Repeat EKG shows some T wave flattening, Would repeat limited echo with contrast for assessment of wall motion abnormalities   -Would recommend outpatient aortic valve calcium score, give borderline   -F/u MRI LE for OM     Subjective/Objective   Chief Complaint: CHF     Subjective: Patient reports significant improvement in  "symptoms with no dyspnea, denies chest pain, palpitations, dizziness, lower extremity edema, orthopnea, or PND.    Objective:     Vitals: /60 (BP Location: Right arm)   Pulse 57   Temp 97.8 °F (36.6 °C) (Oral)   Resp 20   Ht 5' 11\" (1.803 m)   Wt 101 kg (222 lb 14.2 oz)   SpO2 96%   BMI 31.09 kg/m²   Vitals:    09/01/24 0823 09/02/24 0546   Weight: 97.5 kg (215 lb) 101 kg (222 lb 14.2 oz)     Orthostatic Blood Pressures      Flowsheet Row Most Recent Value   Blood Pressure 102/60 filed at 09/02/2024 1500   Patient Position - Orthostatic VS Sitting filed at 09/02/2024 1500              Intake/Output Summary (Last 24 hours) at 9/2/2024 1646  Last data filed at 9/2/2024 1250  Gross per 24 hour   Intake 250 ml   Output 2925 ml   Net -2675 ml       Invasive Devices       Peripheral Intravenous Line  Duration             Peripheral IV 09/01/24 Dorsal (posterior);Right Forearm 1 day                    Review of Systems: See above    Physical Exam:     Gen: NAD  Neck: No elevated JVP  Cardiac: RRR, Murmur +, I believe he still has A2 component of his S2  Lung: CTAB  LE: No edema, wound + but bandaged    Lab Results: I have personally reviewed pertinent lab results.    Imaging: I have personally reviewed pertinent reports.    EKG: Reviewed   VTE Pharmacologic Prophylaxis: Heparin  VTE Mechanical Prophylaxis: reason for no mechanical VTE prophylaxis on heparin    Counseling / Coordination of Care  Total time spent today 37 minutes. Greater than 50% of total time was spent with the patient and / or family counseling and / or coordination of care. A description of the counseling / coordination of care: Discussed next steps in hospitalization from cardiac perspective with primary team and patient  "

## 2024-09-02 NOTE — PROGRESS NOTES
Zain Gayle is a 59 y.o. male who is currently ordered Vancomycin IV with management by the Pharmacy Consult service.  Relevant clinical data and objective / subjective history reviewed.  Vancomycin Assessment:  Indication and Goal AUC/Trough: Soft tissue (goal -600, trough >10); Bone/joint infection (goal -600, trough >10)  Clinical Status: ICU admit  Micro:     Renal Function:  SCr: 1.05 mg/dL  CrCl: 94 mL/min  Renal replacement: Not on dialysis  Days of Therapy: 2  Current Dose: 750mg IV q12h  Vancomycin Plan:  New Dosinmg IV q12h  Estimated AUC: 448 mcg*hr/mL  Estimated Trough: 13.3 mcg/mL  Next Level: 9/3/24 @05:00  Renal Function Monitoring: Daily BMP and UOP  Pharmacy will continue to follow closely for s/sx of nephrotoxicity, infusion reactions and appropriateness of therapy.  BMP and CBC will be ordered per protocol. We will continue to follow the patient’s culture results and clinical progress daily.    Shraddha Irwin, Pharmacist

## 2024-09-02 NOTE — PHYSICAL THERAPY NOTE
Physical Therapy Cancellation Note                 09/02/24 1321   PT Last Visit   PT Visit Date 09/02/24   Note Type   Note type Cancelled Session   Cancel Reasons Medical status   Additional Comments PT orders received + chart review completed. pt pending podiatry c/s + reading of foot x-rays. will away podiatry recs + x-ray results prior to PT eval.     AMANDA ParkT

## 2024-09-02 NOTE — ASSESSMENT & PLAN NOTE
Baseline crt 1.1-1.25  Presenting with crt 1.57  Likely secondary to chf exacerbation   Now resolved

## 2024-09-02 NOTE — PLAN OF CARE
Problem: PAIN - ADULT  Goal: Verbalizes/displays adequate comfort level or baseline comfort level  Description: Interventions:  - Encourage patient to monitor pain and request assistance  - Assess pain using appropriate pain scale  - Administer analgesics based on type and severity of pain and evaluate response  - Implement non-pharmacological measures as appropriate and evaluate response  - Consider cultural and social influences on pain and pain management  - Notify physician/advanced practitioner if interventions unsuccessful or patient reports new pain  Outcome: Progressing     Problem: INFECTION - ADULT  Goal: Absence or prevention of progression during hospitalization  Description: INTERVENTIONS:  - Assess and monitor for signs and symptoms of infection  - Monitor lab/diagnostic results  - Monitor all insertion sites, i.e. indwelling lines, tubes, and drains  - Monitor endotracheal if appropriate and nasal secretions for changes in amount and color  - [place] appropriate cooling/warming therapies per order  - Administer medications as ordered  - Instruct and encourage patient and family to use good hand hygiene technique  - Identify and instruct in appropriate isolation precautions for identified infection/condition  Outcome: Progressing  Goal: Absence of fever/infection during neutropenic period  Description: INTERVENTIONS:  - Monitor WBC    Outcome: Progressing     Problem: SAFETY ADULT  Goal: Patient will remain free of falls  Description: INTERVENTIONS:  - Educate patient/family on patient safety including physical limitations  - Instruct patient to call for assistance with activity   - Consult OT/PT to assist with strengthening/mobility   - Keep Call bell within reach  - Keep bed low and locked with side rails adjusted as appropriate  - Keep care items and personal belongings within reach  - Initiate and maintain comfort rounds  - Make Fall Risk Sign visible to staff  - Apply yellow socks and bracelet  for high fall risk patients  - Consider moving patient to room near nurses station  Outcome: Progressing  Goal: Maintain or return to baseline ADL function  Description: INTERVENTIONS:  -  Assess patient's ability to carry out ADLs; assess patient's baseline for ADL function and identify physical deficits which impact ability to perform ADLs (bathing, care of mouth/teeth, toileting, grooming, dressing, etc.)  - Assess/evaluate cause of self-care deficits   - Assess range of motion  - Assess patient's mobility; develop plan if impaired  - Assess patient's need for assistive devices and provide as appropriate  - Encourage maximum independence but intervene and supervise when necessary  - Involve family in performance of ADLs  - Assess for home care needs following discharge   - Consider OT consult to assist with ADL evaluation and planning for discharge  - Provide patient education as appropriate  Outcome: Progressing  Goal: Maintains/Returns to pre admission functional level  Description: INTERVENTIONS:  - Perform AM-PAC 6 Click Basic Mobility/ Daily Activity assessment daily.  - Set and communicate daily mobility goal to care team and patient/family/caregiver.   - Collaborate with rehabilitation services on mobility goals if consulted  - Out of bed for toileting  - Record patient progress and toleration of activity level   Outcome: Progressing     Problem: DISCHARGE PLANNING  Goal: Discharge to home or other facility with appropriate resources  Description: INTERVENTIONS:  - Identify barriers to discharge w/patient and caregiver  - Arrange for needed discharge resources and transportation as appropriate  - Identify discharge learning needs (meds, wound care, etc.)  - Arrange for interpretive services to assist at discharge as needed  - Refer to Case Management Department for coordinating discharge planning if the patient needs post-hospital services based on physician/advanced practitioner order or complex needs  related to functional status, cognitive ability, or social support system  Outcome: Progressing     Problem: Knowledge Deficit  Goal: Patient/family/caregiver demonstrates understanding of disease process, treatment plan, medications, and discharge instructions  Description: Complete learning assessment and assess knowledge base.  Interventions:  - Provide teaching at level of understanding  - Provide teaching via preferred learning methods  Outcome: Progressing

## 2024-09-02 NOTE — ASSESSMENT & PLAN NOTE
Trop >1200, suspect nonischemic myocardial injury but in setting of mod/severe AS may need to consider additional testing   Cardiology following-he will need repeat limited echo with Definity contrast to assess for wall motion abnormalities

## 2024-09-03 ENCOUNTER — APPOINTMENT (INPATIENT)
Dept: MRI IMAGING | Facility: HOSPITAL | Age: 59
DRG: 710 | End: 2024-09-03
Payer: COMMERCIAL

## 2024-09-03 ENCOUNTER — APPOINTMENT (INPATIENT)
Dept: VASCULAR ULTRASOUND | Facility: HOSPITAL | Age: 59
DRG: 710 | End: 2024-09-03
Payer: COMMERCIAL

## 2024-09-03 ENCOUNTER — APPOINTMENT (INPATIENT)
Dept: NON INVASIVE DIAGNOSTICS | Facility: HOSPITAL | Age: 59
DRG: 710 | End: 2024-09-03
Payer: COMMERCIAL

## 2024-09-03 LAB
ANION GAP SERPL CALCULATED.3IONS-SCNC: 6 MMOL/L (ref 4–13)
BSA FOR ECHO PROCEDURE: 2.2 M2
BUN SERPL-MCNC: 31 MG/DL (ref 5–25)
CALCIUM SERPL-MCNC: 8.4 MG/DL (ref 8.4–10.2)
CHLORIDE SERPL-SCNC: 104 MMOL/L (ref 96–108)
CO2 SERPL-SCNC: 27 MMOL/L (ref 21–32)
CREAT SERPL-MCNC: 0.91 MG/DL (ref 0.6–1.3)
ERYTHROCYTE [DISTWIDTH] IN BLOOD BY AUTOMATED COUNT: 14.1 % (ref 11.6–15.1)
FRACTIONAL SHORTENING: 28 (ref 28–44)
GFR SERPL CREATININE-BSD FRML MDRD: 93 ML/MIN/1.73SQ M
GLUCOSE SERPL-MCNC: 130 MG/DL (ref 65–140)
GLUCOSE SERPL-MCNC: 157 MG/DL (ref 65–140)
GLUCOSE SERPL-MCNC: 229 MG/DL (ref 65–140)
GLUCOSE SERPL-MCNC: 247 MG/DL (ref 65–140)
GLUCOSE SERPL-MCNC: 296 MG/DL (ref 65–140)
GLUCOSE SERPL-MCNC: 312 MG/DL (ref 65–140)
HCT VFR BLD AUTO: 36.4 % (ref 36.5–49.3)
HGB BLD-MCNC: 11.5 G/DL (ref 12–17)
INTERVENTRICULAR SEPTUM IN DIASTOLE (PARASTERNAL SHORT AXIS VIEW): 1.8 CM
INTERVENTRICULAR SEPTUM: 1.8 CM (ref 0.6–1.1)
LEFT INTERNAL DIMENSION IN SYSTOLE: 3.3 CM (ref 2.1–4)
LEFT VENTRICULAR INTERNAL DIMENSION IN DIASTOLE: 4.6 CM (ref 3.5–6)
LEFT VENTRICULAR POSTERIOR WALL IN END DIASTOLE: 1.4 CM
LEFT VENTRICULAR STROKE VOLUME: 52 ML
LV EF US.2D.A4C+ESTIMATED: 57 %
LVSV (TEICH): 52 ML
MCH RBC QN AUTO: 27.1 PG (ref 26.8–34.3)
MCHC RBC AUTO-ENTMCNC: 31.6 G/DL (ref 31.4–37.4)
MCV RBC AUTO: 86 FL (ref 82–98)
PLATELET # BLD AUTO: 284 THOUSANDS/UL (ref 149–390)
PMV BLD AUTO: 10.6 FL (ref 8.9–12.7)
POTASSIUM SERPL-SCNC: 4.2 MMOL/L (ref 3.5–5.3)
RBC # BLD AUTO: 4.25 MILLION/UL (ref 3.88–5.62)
SL CV LV EF: 55
SL CV PED ECHO LEFT VENTRICLE DIASTOLIC VOLUME (MOD BIPLANE) 2D: 97 ML
SL CV PED ECHO LEFT VENTRICLE SYSTOLIC VOLUME (MOD BIPLANE) 2D: 45 ML
SODIUM SERPL-SCNC: 137 MMOL/L (ref 135–147)
VANCOMYCIN SERPL-MCNC: 12.1 UG/ML (ref 10–20)
WBC # BLD AUTO: 11.13 THOUSAND/UL (ref 4.31–10.16)

## 2024-09-03 PROCEDURE — 80048 BASIC METABOLIC PNL TOTAL CA: CPT | Performed by: STUDENT IN AN ORGANIZED HEALTH CARE EDUCATION/TRAINING PROGRAM

## 2024-09-03 PROCEDURE — 80202 ASSAY OF VANCOMYCIN: CPT | Performed by: NURSE PRACTITIONER

## 2024-09-03 PROCEDURE — 99232 SBSQ HOSP IP/OBS MODERATE 35: CPT | Performed by: INTERNAL MEDICINE

## 2024-09-03 PROCEDURE — 73720 MRI LWR EXTREMITY W/O&W/DYE: CPT

## 2024-09-03 PROCEDURE — 0JBR0ZZ EXCISION OF LEFT FOOT SUBCUTANEOUS TISSUE AND FASCIA, OPEN APPROACH: ICD-10-PCS | Performed by: STUDENT IN AN ORGANIZED HEALTH CARE EDUCATION/TRAINING PROGRAM

## 2024-09-03 PROCEDURE — 0JBQ0ZZ EXCISION OF RIGHT FOOT SUBCUTANEOUS TISSUE AND FASCIA, OPEN APPROACH: ICD-10-PCS | Performed by: STUDENT IN AN ORGANIZED HEALTH CARE EDUCATION/TRAINING PROGRAM

## 2024-09-03 PROCEDURE — 93308 TTE F-UP OR LMTD: CPT | Performed by: INTERNAL MEDICINE

## 2024-09-03 PROCEDURE — 82948 REAGENT STRIP/BLOOD GLUCOSE: CPT

## 2024-09-03 PROCEDURE — 93923 UPR/LXTR ART STDY 3+ LVLS: CPT

## 2024-09-03 PROCEDURE — A9585 GADOBUTROL INJECTION: HCPCS | Performed by: INTERNAL MEDICINE

## 2024-09-03 PROCEDURE — 85027 COMPLETE CBC AUTOMATED: CPT | Performed by: STUDENT IN AN ORGANIZED HEALTH CARE EDUCATION/TRAINING PROGRAM

## 2024-09-03 PROCEDURE — 93308 TTE F-UP OR LMTD: CPT

## 2024-09-03 RX ORDER — LISINOPRIL 5 MG/1
5 TABLET ORAL DAILY
Status: DISCONTINUED | OUTPATIENT
Start: 2024-09-04 | End: 2024-09-12 | Stop reason: HOSPADM

## 2024-09-03 RX ORDER — GADOBUTROL 604.72 MG/ML
10 INJECTION INTRAVENOUS
Status: COMPLETED | OUTPATIENT
Start: 2024-09-03 | End: 2024-09-03

## 2024-09-03 RX ADMIN — AMLODIPINE BESYLATE 10 MG: 10 TABLET ORAL at 10:06

## 2024-09-03 RX ADMIN — HEPARIN SODIUM 5000 UNITS: 5000 INJECTION INTRAVENOUS; SUBCUTANEOUS at 05:22

## 2024-09-03 RX ADMIN — INSULIN LISPRO 6 UNITS: 100 INJECTION, SOLUTION INTRAVENOUS; SUBCUTANEOUS at 09:11

## 2024-09-03 RX ADMIN — LISINOPRIL 20 MG: 20 TABLET ORAL at 10:06

## 2024-09-03 RX ADMIN — HEPARIN SODIUM 5000 UNITS: 5000 INJECTION INTRAVENOUS; SUBCUTANEOUS at 14:52

## 2024-09-03 RX ADMIN — INSULIN LISPRO 2 UNITS: 100 INJECTION, SOLUTION INTRAVENOUS; SUBCUTANEOUS at 14:58

## 2024-09-03 RX ADMIN — CEFTRIAXONE SODIUM 1000 MG: 10 INJECTION, POWDER, FOR SOLUTION INTRAVENOUS at 05:22

## 2024-09-03 RX ADMIN — GADOBUTROL 10 ML: 604.72 INJECTION INTRAVENOUS at 23:27

## 2024-09-03 RX ADMIN — VANCOMYCIN HYDROCHLORIDE 1000 MG: 1 INJECTION, SOLUTION INTRAVENOUS at 18:57

## 2024-09-03 RX ADMIN — ATORVASTATIN CALCIUM 40 MG: 40 TABLET, FILM COATED ORAL at 10:06

## 2024-09-03 RX ADMIN — ASPIRIN 81 MG: 81 TABLET, COATED ORAL at 10:06

## 2024-09-03 RX ADMIN — PERFLUTREN 0.6 ML/MIN: 6.52 INJECTION, SUSPENSION INTRAVENOUS at 07:20

## 2024-09-03 RX ADMIN — INSULIN GLARGINE 15 UNITS: 100 INJECTION, SOLUTION SUBCUTANEOUS at 21:06

## 2024-09-03 RX ADMIN — HEPARIN SODIUM 5000 UNITS: 5000 INJECTION INTRAVENOUS; SUBCUTANEOUS at 21:06

## 2024-09-03 RX ADMIN — METOPROLOL SUCCINATE 50 MG: 50 TABLET, EXTENDED RELEASE ORAL at 10:06

## 2024-09-03 RX ADMIN — INSULIN LISPRO 5 UNITS: 100 INJECTION, SOLUTION INTRAVENOUS; SUBCUTANEOUS at 21:06

## 2024-09-03 RX ADMIN — VANCOMYCIN HYDROCHLORIDE 1000 MG: 1 INJECTION, SOLUTION INTRAVENOUS at 09:00

## 2024-09-03 RX ADMIN — PANTOPRAZOLE SODIUM 40 MG: 40 TABLET, DELAYED RELEASE ORAL at 05:22

## 2024-09-03 RX ADMIN — FUROSEMIDE 40 MG: 40 TABLET ORAL at 10:06

## 2024-09-03 NOTE — PROGRESS NOTES
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  Continue with vancomycin for now  Podiatry consult-followed previously by podiatrist at [facility], has not established with one in this area    Type 2 diabetes mellitus with hyperglycemia (HCC)  Assessment & Plan  Continue Lantus 15 units nightly  Sliding-scale insulin  Monitor blood glucose    Gastroesophageal reflux disease without esophagitis  Assessment & Plan  Continue PPI    Essential hypertension  Assessment & Plan  BP controlled  Continue Lasix, lisinopril, amlodipine, beta-blocker    Elevated troponin  Assessment & Plan  Trop >1200, suspect nonischemic myocardial injury but in setting of mod/severe AS may need to consider additional testing   Cardiology following  Follow-up echo to rule out ischemia    Aortic valve stenosis  Assessment & Plan  Noted to have moderate aortic stenosis with fused valve on echocardiogram in 2023  Patient has been discussing valve replacement with his outpatient cardiology   Follow-up repeat echo    ODALYS (acute kidney injury) (HCC)  Assessment & Plan  Baseline crt 1.1-1.25  Presenting with crt 1.57  Likely secondary to chf exacerbation   Now back to baseline continue to monitor    Acute exacerbation of CHF  (congestive heart failure) (HCC)  Assessment & Plan  Wt Readings from Last 3 Encounters:   24 101 kg (222 lb)   10/05/21 104 kg (229 lb)     Presented with acute onset shortness of breath  CT negative for PE with overall appearance of volume overload  Clinically, he appears fairly euvolemic  Resolved with IV diuretics  Since been transitioned to oral Lasix 40 daily  Follow-up echo         VTE Pharmacologic Prophylaxis:   Pharmacologic: Heparin  Mechanical VTE Prophylaxis in Place: Yes    Patient Centered Rounds: I have performed bedside rounds with nursing staff today.    Discussions with Specialists or Other Care Team Provider: cm, nursing    Education and Discussions with Family / Patient: pt, declined family update    Time Spent for Care: 30 minutes.  More than 50% of total time spent on counseling and coordination of care as described above.    Current Length of Stay: 2 day(s)    Current Patient Status: Inpatient   Certification Statement: The patient will continue to require additional inpatient hospital stay due to see below    Discharge Plan: Pending echo and MRI for foot to rule out osteomyelitis.  Anticipate at least 48 hours    Code Status: Level 1 - Full Code      Subjective:   Currently denies any acute complaints.  Denies chest pain, shortness of breath, cough, fevers, chills    Objective:     Vitals:   Temp (24hrs), Av.8 °F (36.6 °C), Min:97.6 °F (36.4 °C), Max:98.3 °F (36.8 °C)    Temp:  [97.6 °F (36.4 °C)-98.3 °F (36.8 °C)] 97.6 °F (36.4 °C)  HR:  [48-66] 54  Resp:  [18-20] 18  BP: ()/(57-65) 125/65  SpO2:  [93 %-96 %] 94 %  Body mass index is 30.96 kg/m².     Input and Output Summary (last 24 hours):       Intake/Output Summary (Last 24 hours) at 9/3/2024 1034  Last data filed at 9/3/2024 1001  Gross per 24 hour   Intake 400 ml   Output 2325 ml   Net -1925 ml       Physical Exam:     Physical Exam  Constitutional:       General: He is not in acute distress.     Appearance: He is  well-developed. He is not diaphoretic.   HENT:      Head: Normocephalic and atraumatic.      Nose: Nose normal.      Mouth/Throat:      Pharynx: No oropharyngeal exudate.   Eyes:      General: No scleral icterus.     Conjunctiva/sclera: Conjunctivae normal.   Cardiovascular:      Rate and Rhythm: Normal rate and regular rhythm.      Heart sounds: Normal heart sounds. No murmur heard.     No friction rub. No gallop.   Pulmonary:      Effort: Pulmonary effort is normal. No respiratory distress.      Breath sounds: Normal breath sounds. No wheezing or rales.   Chest:      Chest wall: No tenderness.   Abdominal:      General: Bowel sounds are normal. There is no distension.      Palpations: Abdomen is soft.      Tenderness: There is no abdominal tenderness. There is no guarding.   Musculoskeletal:         General: No tenderness, deformity or edema. Normal range of motion.      Cervical back: Normal range of motion and neck supple.   Skin:     General: Skin is warm and dry.      Findings: No erythema.   Neurological:      Mental Status: He is alert. Mental status is at baseline.   Psychiatric:         Mood and Affect: Mood and affect normal.       (    Additional Data:     Labs:    Results from last 7 days   Lab Units 09/03/24  0526 09/02/24  0545   WBC Thousand/uL 11.13* 16.72*   HEMOGLOBIN g/dL 11.5* 11.5*   HEMATOCRIT % 36.4* 35.3*   PLATELETS Thousands/uL 284 286   SEGS PCT %  --  88*   LYMPHO PCT %  --  7*   MONO PCT %  --  4   EOS PCT %  --  0     Results from last 7 days   Lab Units 09/03/24  0526 09/02/24  0545   SODIUM mmol/L 137 132*   POTASSIUM mmol/L 4.2 4.2   CHLORIDE mmol/L 104 100   CO2 mmol/L 27 25   BUN mg/dL 31* 34*   CREATININE mg/dL 0.91 1.05   ANION GAP mmol/L 6 7   CALCIUM mg/dL 8.4 8.6   ALBUMIN g/dL  --  3.1*   TOTAL BILIRUBIN mg/dL  --  0.39   ALK PHOS U/L  --  95   ALT U/L  --  13   AST U/L  --  13   GLUCOSE RANDOM mg/dL 247* 375*         Results from last 7 days   Lab Units 09/03/24  1022  09/03/24  0713 09/02/24  2107 09/02/24  1605 09/02/24  1102 09/02/24  0723 09/01/24  2048 09/01/24  1546 09/01/24  1042 09/01/24  0722   POC GLUCOSE mg/dl 296* 229* 330* 212* 309* 324* 361* 309* 421* 500*     Results from last 7 days   Lab Units 09/01/24  0756   HEMOGLOBIN A1C % 9.8*     Results from last 7 days   Lab Units 09/02/24  0545 09/01/24  0547   LACTIC ACID mmol/L  --  1.7   PROCALCITONIN ng/ml 0.24 0.09           * I Have Reviewed All Lab Data Listed Above.  * Additional Pertinent Lab Tests Reviewed: All Labs Within Last 24 Hours Reviewed    Imaging:    Imaging Reports Reviewed Today Include: na  Imaging Personally Reviewed by Myself Includes:  na    Recent Cultures (last 7 days):     Results from last 7 days   Lab Units 09/01/24  0557 09/01/24  0547   BLOOD CULTURE  No Growth at 24 hrs. No Growth at 24 hrs.       Last 24 Hours Medication List:   Current Facility-Administered Medications   Medication Dose Route Frequency Provider Last Rate    amLODIPine  10 mg Oral Daily CHERIE Bates      aspirin  81 mg Oral Daily CHERIE Bates      atorvastatin  40 mg Oral Daily CHERIE Bates      cefTRIAXone  1,000 mg Intravenous Q24H KANE BatesNP 1,000 mg (09/03/24 0522)    furosemide  40 mg Oral Daily CHERIE Bates      heparin (porcine)  5,000 Units Subcutaneous Q8H CHRISTIANO CHERIE Bates      insulin glargine  15 Units Subcutaneous HS Sergo Christine MD      insulin lispro  1-6 Units Subcutaneous HS CHERIE Bates      insulin lispro  2-12 Units Subcutaneous TID AC CHERIE Bates      lisinopril  20 mg Oral Daily CHERIE Bates      metoprolol succinate  50 mg Oral Daily CHERIE Bates      ondansetron  4 mg Intravenous Q6H PRN CHERIE Bates      pantoprazole  40 mg Oral Early Morning CHERIE Bates      vancomycin  1,000 mg Intravenous Q12H Sergo Christine MD 1,000 mg (09/03/24 0900)        Today, Patient Was Seen By: Landon Dasilva,  MD    ** Please Note: Dictation voice to text software may have been used in the creation of this document. **

## 2024-09-03 NOTE — PROGRESS NOTES
"Cardiology Progress Note - Zain Gayle 59 y.o. male MRN: [id number]    Unit/Bed#: ICU 12 Encounter: [id number]      Assessment/Plan:  Assessment & Plan  Acute exacerbation of CHF (congestive heart failure) (HCC)  Wt Readings from Last 3 Encounters:   09/03/24 101 kg (222 lb)   10/05/21 104 kg (229 lb)   Responded well to Lasix 20 mg IV on admission.  Patient now appears euvolemic  Transition to patient's home dose of Lasix 40 mg p.o. daily.  Acute respiratory failure with hypoxia (HCC)  S/p BiPAP and IV Lasix.  Significantly improved  CT scan concerning for respiratory illness as well, on IV abx, management per primary team  Aortic valve stenosis  Moderate to severe aortic stenosis noted this admission  Plan for further OP evaluation  ODALYS (acute kidney injury) (HCC)  resolved  Essential hypertension  Continue metoprolol succinate 50 mg daily.  Type 2 diabetes mellitus with hyperglycemia (HCC)  Lab Results   Component Value Date    HGBA1C 9.8 (H) 09/01/2024       Recent Labs     09/02/24  2107 09/03/24  0713 09/03/24  1022 09/03/24  1451   POCGLU 330* 229* 296* 157*       Blood Sugar Average: Last 72 hrs:  (P) [id number]    Bilateral pressure ulcer of feet  Podiatry following  Elevated troponin  HS troponin peak 1205  Recommend OP ischemic evaluation as patient is currently asymptomatic and undergoing evaluation for osteomyelitis      Subjective:   Patient seen and examined.  No significant events overnight.     Objective:     Vitals: Blood pressure 125/65, pulse (!) 54, temperature 97.6 °F (36.4 °C), temperature source Oral, resp. rate 18, height 5' 11\" (1.803 m), weight 101 kg (222 lb), SpO2 94%., Body mass index is 30.96 kg/m².,   Orthostatic Blood Pressures      Flowsheet Row Most Recent Value   Blood Pressure 125/65 filed at 09/03/2024 0700   Patient Position - Orthostatic VS Lying filed at 09/03/2024 0700              Intake/Output Summary (Last 24 hours) at 9/3/2024 1515  Last data filed at 9/3/2024 " 1300  Gross per 24 hour   Intake 680 ml   Output 1950 ml   Net -1270 ml         Physical Exam:  Physical Exam  Vitals and nursing note reviewed.   Constitutional:       General: He is not in acute distress.     Appearance: He is well-developed.   HENT:      Head: Normocephalic and atraumatic.   Eyes:      Conjunctiva/sclera: Conjunctivae normal.   Neck:      Vascular: No carotid bruit.   Cardiovascular:      Rate and Rhythm: Normal rate and regular rhythm.      Heart sounds: Murmur heard.   Pulmonary:      Effort: Pulmonary effort is normal. No respiratory distress.      Breath sounds: Normal breath sounds.   Abdominal:      Palpations: Abdomen is soft.      Tenderness: There is no abdominal tenderness.   Musculoskeletal:         General: No swelling.      Cervical back: Neck supple.      Right lower leg: No edema.      Left lower leg: No edema.   Skin:     General: Skin is warm and dry.      Capillary Refill: Capillary refill takes less than 2 seconds.      Comments: Left foot wound   Neurological:      Mental Status: He is alert and oriented to person, place, and time.   Psychiatric:         Mood and Affect: Mood normal.              Medications:      Current Facility-Administered Medications:     amLODIPine (NORVASC) tablet 10 mg, 10 mg, Oral, Daily, CHERIE Bates, 10 mg at 09/03/24 1006    aspirin (ECOTRIN LOW STRENGTH) EC tablet 81 mg, 81 mg, Oral, Daily, CHERIE Bates, 81 mg at 09/03/24 1006    atorvastatin (LIPITOR) tablet 40 mg, 40 mg, Oral, Daily, CHERIE Bates, 40 mg at 09/03/24 1006    cefTRIAXone (ROCEPHIN) 1,000 mg in dextrose 5 % 50 mL IVPB, 1,000 mg, Intravenous, Q24H, CHERIE Bates, Last Rate: 100 mL/hr at 09/03/24 0522, 1,000 mg at 09/03/24 0522    furosemide (LASIX) tablet 40 mg, 40 mg, Oral, Daily, CHERIE Bates, 40 mg at 09/03/24 1006    heparin (porcine) subcutaneous injection 5,000 Units, 5,000 Units, Subcutaneous, Q8H CHRISTIANO, 5,000 Units at 09/03/24 1452 **AND**  [COMPLETED] Platelet count, , , Once, CHERIE Ortega    insulin glargine (LANTUS) subcutaneous injection 15 Units 0.15 mL, 15 Units, Subcutaneous, HS, Sergo Christine MD, 15 Units at 09/02/24 2109    insulin lispro (HumALOG/ADMELOG) 100 units/mL subcutaneous injection 1-6 Units, 1-6 Units, Subcutaneous, HS, CHERIE Bates, 5 Units at 09/02/24 2107    insulin lispro (HumALOG/ADMELOG) 100 units/mL subcutaneous injection 2-12 Units, 2-12 Units, Subcutaneous, TID AC, 2 Units at 09/03/24 1458 **AND** Fingerstick Glucose (POCT), , , TID AC, CHERIE Bates    [START ON 9/4/2024] lisinopril (ZESTRIL) tablet 5 mg, 5 mg, Oral, Daily, Charles Persaud PA-C    metoprolol succinate (TOPROL-XL) 24 hr tablet 50 mg, 50 mg, Oral, Daily, CHERIE Bates, 50 mg at 09/03/24 1006    ondansetron (ZOFRAN) injection 4 mg, 4 mg, Intravenous, Q6H PRN, CHERIE Bates, 4 mg at 09/01/24 1130    pantoprazole (PROTONIX) EC tablet 40 mg, 40 mg, Oral, Early Morning, CHERIE Bates, 40 mg at 09/03/24 0522    vancomycin (VANCOCIN) IVPB (premix in dextrose) 1,000 mg 200 mL, 1,000 mg, Intravenous, Q12H, Sergo Christine MD, Last Rate: 200 mL/hr at 09/03/24 0900, 1,000 mg at 09/03/24 0900     Labs & Results:     Results from last 7 days   Lab Units 09/01/24  0800 09/01/24  0547 09/01/24  0351   HS TNI 0HR ng/L  --   --  54*   HS TNI 2HR ng/L  --  105*  --    HSTNI D2 ng/L  --  51*  --    HS TNI 4HR ng/L 480*  --   --    HSTNI D4 ng/L 426*  --   --      Results from last 7 days   Lab Units 09/03/24 0526 09/02/24  0545 09/01/24  0756 09/01/24  0351   WBC Thousand/uL 11.13* 16.72*  --  17.45*   HEMOGLOBIN g/dL 11.5* 11.5*  --  13.8   HEMATOCRIT % 36.4* 35.3*  --  43.4   PLATELETS Thousands/uL 284 286 286 423*         Results from last 7 days   Lab Units 09/03/24 0526 09/02/24  0545 09/01/24  0351   POTASSIUM mmol/L 4.2 4.2 3.6   CHLORIDE mmol/L 104 100 101   CO2 mmol/L 27 25 24   BUN mg/dL 31* 34* 31*  "  CREATININE mg/dL 0.91 1.05 1.57*   CALCIUM mg/dL 8.4 8.6 9.2   ALK PHOS U/L  --  95 124*   ALT U/L  --  13 15   AST U/L  --  13 16         Results from last 7 days   Lab Units 24  0545   MAGNESIUM mg/dL 1.9       Vitals: Blood pressure 125/65, pulse (!) 54, temperature 97.6 °F (36.4 °C), temperature source Oral, resp. rate 18, height 5' 11\" (1.803 m), weight 101 kg (222 lb), SpO2 94%., Body mass index is 30.96 kg/m².,   Orthostatic Blood Pressures      Flowsheet Row Most Recent Value   Blood Pressure 125/65 filed at 2024 0700   Patient Position - Orthostatic VS Lying filed at 2024 0700            Systolic (24hrs), Av , Min:98 , Max:125     Diastolic (24hrs), Av, Min:57, Max:65        Intake/Output Summary (Last 24 hours) at 9/3/2024 1515  Last data filed at 9/3/2024 1300  Gross per 24 hour   Intake 680 ml   Output 1950 ml   Net -1270 ml       Invasive Devices       Peripheral Intravenous Line  Duration             Peripheral IV 24 Dorsal (posterior);Right Forearm 2 days                          Telemetry:  Telemetry Orders (From admission, onward)               24 Hour Telemetry Monitoring  Continuous x 24 Hours (Telem)        Question:  Reason for 24 Hour Telemetry  Answer:  PCI/EP study (including pacer and ICD implementation), Cardiac surgery, MI, abnormal cardiac cath, and chest pain- rule out MI                       BP Readings from Last 3 Encounters:   24 125/65   24 105/61   24 (!) 176/86      Wt Readings from Last 3 Encounters:   24 101 kg (222 lb)   10/05/21 104 kg (229 lb)                    "

## 2024-09-03 NOTE — PROGRESS NOTES
Zain Gayle is a 59 y.o. male who is currently ordered Vancomycin IV with management by the Pharmacy Consult service.  Relevant clinical data and objective / subjective history reviewed.  Vancomycin Assessment:  Indication and Goal AUC/Trough: Soft tissue (goal -600, trough >10); Bone/joint infection (goal -600, trough >10)  Clinical Status: stable  Micro:     Renal Function:  SCr: 0.91 mg/dL  CrCl: 108.4 mL/min  Renal replacement: Not on dialysis  Days of Therapy: 3  Current Dose: 1000mg q12h  Vancomycin Plan:  New Dosing: same - 1000mg IV q12h  Estimated AUC: 426 mcg*hr/mL  Estimated Trough: 12.4 mcg/mL  Next Level: 9/4 6am  Renal Function Monitoring: Daily BMP and UOP  Pharmacy will continue to follow closely for s/sx of nephrotoxicity, infusion reactions and appropriateness of therapy.  BMP and CBC will be ordered per protocol. We will continue to follow the patient’s culture results and clinical progress daily.    Conchita Serrano, Pharmacist

## 2024-09-03 NOTE — ASSESSMENT & PLAN NOTE
Noted to have moderate aortic stenosis with fused valve on echocardiogram in 2023  Patient has been discussing valve replacement with his outpatient cardiology   Follow-up repeat echo

## 2024-09-03 NOTE — WOUND OSTOMY CARE
Progress Note - Wound   Zain Gayle 59 y.o. male MRN: [id number]  Unit/Bed#: ICU 12 Encounter: [id number]      Assessment:   Patient admitted to [facility] due to acute respiratory failure with hypoxia. Wound care consulted for bilateral foot ulcers. Patient is alert and oriented x4, continent of bowel and bladder, repositions independently. Podiatry assessed bilateral foot wounds today and performed bed-side debridements, plan for MRI of bilateral foot. Podiatry is following along for these wounds. Patient declines assessment of skin and states there are no other wounds or skin changes.    Wound care will sign off at this time, please re-consult if needed. Please follow skin care recommendations written as orders for skin protection and prevention.    Skin care plans:  1-Hydraguard to bilateral sacrum, buttock and heels BID and PRN  2-Elevate heels to offload pressure.  3-Ehob cushion in chair when out of bed.  4-Moisturize skin daily with skin nourishing cream.  5-Bilateral foot ulcers- wound care per Podiatry.       Wound Care will sign off  Contact through Myand[employer] Secure Chat for any questions/concerns    Ros COLE RN CWON  Wound and Ostomy care

## 2024-09-03 NOTE — UTILIZATION REVIEW
Initial Clinical Review    Admission: Date/Time/Statement:   Admission Orders (From admission, onward)       Ordered        09/01/24 0600  INPATIENT ADMISSION  Once                          Orders Placed This Encounter   Procedures    INPATIENT ADMISSION     Standing Status:   Standing     Number of Occurrences:   1     Order Specific Question:   Level of Care     Answer:   Level 1 Stepdown [13]     Order Specific Question:   Estimated length of stay     Answer:   More than 2 Midnights     Order Specific Question:   Certification     Answer:   I certify that inpatient services are medically necessary for this patient for a duration of greater than two midnights. See H&P and MD Progress Notes for additional information about the patient's course of treatment.     ED Arrival Information       Expected   -    Arrival   9/1/2024 03:42    Acuity   Emergent              Means of arrival   Ambulance    Escorted by   [facility]    Service   Hospitalist    Admission type   Emergency              Arrival complaint   Shortness of breath             Chief Complaint   Patient presents with    Shortness of Breath     Pt. Arrives ED via ambulance for acute SOB with cough. Duoneb x2 and solumed 125mg. enroute. Home air sat was 88%       Initial Presentation: 59 y.o. male to the ED from home via EMS with complaints of shortness of breath, cough. H/O CAD, CHF, DM, HTN, bilateral foot ulcerations, moderate aortic stenosis.  Arrives with tachycardia, tachypnea, elevated bp.ADmitted to CRITICAL CARE step down for acute respiratory failure with hypoxia.  REquired bipap for short time while in the ED.  Weaned to 2 LNC.  On arrival, using accessory muscles to breathe, rhonchi and rales heard in lungs.  Given IV steroids, IV lasix in the ED.  CT chest shows volume overload.  Cardiology consult. Creat on arrival 1.57.  Likely due to jada.  Has right foot ulcer initially caused by brown recluse spider resulting in ostemyelitis,  right digit amputation.  Left toe ulcer more recent injury when he stubbed his toe.  Foul smelling drainage from area.  Consult podiatry and wound care. WBCs 17.45 on arrival.   Cardiology consult:  H/O CAD s/p stents.  REsponding well to iv lasix.  Troponin elevated. CT scan concerning for respiratory illness as well, on IV abx. Continue to trend troponins, check ECHO.     Date: 9/2   Day 2:  Continue with IV abx.  Check MRI bilateral feet. Wounds  HIghly suspicious for osteomyelitis. Troponins likely elevated for non ischemic myocardial injury in setting of mod/severe AS. Continue with IV lasix. Bilateral MRI lower extremity pending.     ED Triage Vitals   Temperature Pulse Respirations Blood Pressure SpO2 Pain Score   09/01/24 0349 09/01/24 0345 09/01/24 0345 09/01/24 0345 09/01/24 0345 09/01/24 0345   98.5 °F (36.9 °C) (!) 126 (!) 24 (!) 171/101 93 % No Pain     Weight (last 2 days)       Date/Time Weight    09/03/24 0700 101 (222)    09/03/24 0533 101 (222.44)    09/02/24 0546 101 (222.89)    09/01/24 0823 97.5 (215)    09/01/24 0634 97.7 (215.39)    09/01/24 0600 97.7 (215.39)            Vital Signs (last 3 days)       Date/Time Temp Pulse Resp BP MAP (mmHg) SpO2 FiO2 (%) Calculated FIO2 (%) - Nasal Cannula Nasal Cannula O2 Flow Rate (L/min) O2 Device O2 Interface Device Patient Position - Orthostatic VS [place] Coma Scale Score Pain    09/02/24 2000 -- -- -- -- -- -- -- -- -- -- -- -- 15 No Pain    09/02/24 1915 98.3 °F (36.8 °C) 62 20 121/65 86 96 % -- -- -- None (Room air) -- Lying -- No Pain    09/02/24 1500 97.8 °F (36.6 °C) 57 20 102/60 76 96 % -- -- -- None (Room air) -- Sitting -- No Pain    09/02/24 1250 -- 66 -- -- -- 94 % -- -- -- -- -- -- -- --    09/02/24 0806 -- 65 16 127/64 90 99 % -- -- -- -- -- -- -- --    09/02/24 0800 -- -- -- -- -- -- -- -- -- -- -- -- 15 No Pain    09/02/24 0700 97.9 °F (36.6 °C) -- -- 115/61 83 -- -- -- -- -- -- Lying -- --    09/02/24 0300 98.4 °F (36.9 °C) 52 20 102/57  72 92 % -- -- -- None (Room air) -- Lying -- --    09/01/24 2300 98.5 °F (36.9 °C) 73 29 120/57 810 95 % -- -- -- None (Room air) -- Lying -- --    09/01/24 2245 -- 74 27 -- -- 97 % -- -- -- -- -- -- -- --    09/01/24 2000 -- -- -- -- -- -- -- -- -- -- -- -- 15 No Pain    09/01/24 1844 97.6 °F (36.4 °C) 73 28 117/66 86 94 % -- -- -- -- -- Lying -- --    09/01/24 1519 97.8 °F (36.6 °C) 64 25 109/66 82 96 % -- -- -- -- -- Lying -- --    09/01/24 1100 98.2 °F (36.8 °C) -- -- 110/61 -- -- -- -- -- -- -- Sitting -- --    09/01/24 0823 -- 78 -- 134/75 -- -- -- -- -- -- -- -- -- --    09/01/24 0800 -- -- -- -- -- -- -- -- -- -- -- -- 15 No Pain    09/01/24 0700 98.3 °F (36.8 °C) -- -- -- -- -- -- -- -- -- -- -- -- --    09/01/24 0639 -- -- -- -- -- -- -- 32 3 L/min Nasal cannula -- -- -- --    09/01/24 0600 -- 81 29 120/64 86 96 % 40 -- -- BiPAP -- -- -- No Pain    09/01/24 0511 -- -- -- -- -- -- -- -- -- -- Face mask -- -- --    09/01/24 0500 -- 94 24 114/61 83 91 % -- 32 3 L/min Nasal cannula -- -- -- No Pain    09/01/24 0349 98.5 °F (36.9 °C) -- -- -- -- -- -- -- -- -- -- -- -- --    09/01/24 0345 -- 126 24 171/101 128 93 % -- -- -- None (Room air) -- Sitting -- No Pain              Pertinent Labs/Diagnostic Test Results:   Radiology:  CTA ED chest PE Study   Final Interpretation by Justyn Chawla DO (09/01 0507)   Diffuse bilateral multifocal groundglass opacities throughout the lungs most consistent with multifocal infectious process. Early ARDS can have a similar appearance. Clinical correlation is advised   No central or segmental pulmonary embolus.                  Workstation performed: [id number]         XR chest 1 view portable   ED Interpretation by Nia Moreno PA-C (09/01 0406)   Mild pulmonary edema      Final Interpretation by Cedric Rivers MD (09/02 1924)      Mild pulmonary edema, new since the prior examination.            Workstation performed: [id number]         XR foot 2 vw right    (Results Pending)    XR foot 2 vw left    (Results Pending)   MRI inpatient order    (Results Pending)     Cardiology:  Echo follow up/limited w/ contrast if indicated    by ANDREA Antunez (09/03 0735)      ECG 12 lead   Final Result by Tim Whitlock MD (09/02 2542)   Sinus bradycardia   Nonspecific ST and T wave abnormality   Abnormal ECG   When compared with ECG of 01-SEP-2024 03:47,   Vent. rate has decreased BY  67 BPM   ST no longer depressed in Lateral leads   Flat T waves now evident in Anterior leads   Confirmed by Tim Whitlock ([id number]) on 9/2/2024 5:42:24 PM      Echo complete w/ contrast if indicated   Final Result by Tim Whitlock MD (09/01 6937)        Left Ventricle: Left ventricular cavity size is normal. Wall thickness    is increased. There is mild to moderate concentric hypertrophy. LVEF is    likely 50-55% Wall motion cannot be accurately assessed. Diastolic    function is moderately abnormal, consistent with grade II (pseudonormal)    relaxation.     Left Atrium: The atrium is mildly dilated.     Right Atrium: The atrium is mildly dilated.     Aortic Valve: The leaflets are moderately thickened. The leaflets are    moderately calcified. There is moderately reduced mobility. There is    moderate to severe stenosis. LVOT VTI of 22, AV VTI of 84, DVI of 0.26,    mean gradient 29, max gradient of 53.95, with Valve area of 1.      Patient with borderline severe aortic stenosis         ECG 12 lead   Final Result by Tim Whitlock MD (09/01 9065)   Sinus tachycardia   Nonspecific ST and T wave abnormality   Abnormal ECG   When compared with ECG of 20-AUG-2024 07:43,   Vent. rate has increased BY  52 BPM   ST now depressed in Lateral leads   Inverted T waves have replaced nonspecific T wave abnormality in Lateral    leads   Confirmed by Tim Whitlock ([id number]) on 9/1/2024 5:25:08 PM          Results from last 7 days   Lab Units 09/01/24  0402   SARS-COV-2  Negative     Results from last 7 days   Lab Units  09/02/24  0545 09/01/24  0756 09/01/24  0351   WBC Thousand/uL 16.72*  --  17.45*   HEMOGLOBIN g/dL 11.5*  --  13.8   HEMATOCRIT % 35.3*  --  43.4   PLATELETS Thousands/uL 286 286 423*   TOTAL NEUT ABS Thousands/µL 14.78*  --  10.62*         Results from last 7 days   Lab Units 09/02/24  0545 09/01/24  0351   SODIUM mmol/L 132* 135   POTASSIUM mmol/L 4.2 3.6   CHLORIDE mmol/L 100 101   CO2 mmol/L 25 24   ANION GAP mmol/L 7 10   BUN mg/dL 34* 31*   CREATININE mg/dL 1.05 1.57*   EGFR ml/min/1.73sq m 78 48   CALCIUM mg/dL 8.6 9.2   CALCIUM, IONIZED mmol/L 1.12  --    MAGNESIUM mg/dL 1.9  --    PHOSPHORUS mg/dL 3.0  --      Results from last 7 days   Lab Units 09/02/24  0545 09/01/24  0351   AST U/L 13 16   ALT U/L 13 15   ALK PHOS U/L 95 124*   TOTAL PROTEIN g/dL 6.9 8.4   ALBUMIN g/dL 3.1* 3.7   TOTAL BILIRUBIN mg/dL 0.39 0.45     Results from last 7 days   Lab Units 09/03/24  0713 09/02/24  2107 09/02/24  1605 09/02/24  1102 09/02/24  0723 09/01/24  2048 09/01/24  1546 09/01/24  1042 09/01/24  0722   POC GLUCOSE mg/dl 229* 330* 212* 309* 324* 361* 309* 421* 500*     Results from last 7 days   Lab Units 09/02/24  0545 09/01/24  0351   GLUCOSE RANDOM mg/dL 375* 391*         Results from last 7 days   Lab Units 09/01/24  0756   HEMOGLOBIN A1C % 9.8*   EAG mg/dl 235       Results from last 7 days   Lab Units 09/01/24  0800 09/01/24  0547 09/01/24  0351   HS TNI 0HR ng/L  --   --  54*   HS TNI 2HR ng/L  --  105*  --    HSTNI D2 ng/L  --  51*  --    HS TNI 4HR ng/L 480*  --   --    HSTNI D4 ng/L 426*  --   --        Results from last 7 days   Lab Units 09/02/24  0545 09/01/24  0547   PROCALCITONIN ng/ml 0.24 0.09     Results from last 7 days   Lab Units 09/01/24  0547   LACTIC ACID mmol/L 1.7             Results from last 7 days   Lab Units 09/01/24  0351   BNP pg/mL 655*           Results from last 7 days   Lab Units 09/01/24  0402   INFLUENZA A PCR  Negative   INFLUENZA B PCR  Negative   RSV PCR  Negative            Results from last 7 days   Lab Units 09/01/24  0557 09/01/24  0547   BLOOD CULTURE  No Growth at 24 hrs. No Growth at 24 hrs.       Results from last 7 days   Lab Units 09/03/24  0526   VANCOMYCIN RM ug/mL 12.1       ED Treatment-Medication Administration from 09/01/2024 0342 to 09/01/2024 0632         Date/Time Order Dose Route Action     09/01/2024 0351 ipratropium-albuterol (FOR EMS ONLY) (DUO-NEB) 0.5-2.5 mg/3 mL inhalation solution 6 mL 0 mL Does not apply Given to EMS     09/01/2024 0351 methylPREDNISolone sodium succinate (FOR EMS ONLY) (Solu-MEDROL) 125 MG injection 125 mg 0 mg Does not apply Given to EMS     09/01/2024 0407 furosemide (LASIX) injection 20 mg 20 mg Intravenous Given     09/01/2024 0446 iohexol (OMNIPAQUE) 350 MG/ML injection (MULTI-DOSE) 100 mL 100 mL Intravenous Given     09/01/2024 0559 ceftriaxone (ROCEPHIN) 1 g/50 mL in dextrose IVPB 1,000 mg Intravenous New Bag            Past Medical History:   Diagnosis Date    Allergic     Arthritis     Diabetes mellitus ([facility])     Hypertension     MRSA exposure        Admitting Diagnosis: Shortness of breath [R06.02]  CHF (congestive heart failure) ([facility]) [I50.9]  Hypoxia [R09.02]  Productive cough [R05.8]  SIRS (systemic inflammatory response syndrome) ([facility]) [R65.10]  Acute respiratory failure with hypoxia ([facility]) [J96.01]  Age/Sex: 59 y.o. male  Admission Orders:  Scheduled Medications:  amLODIPine, 10 mg, Oral, Daily  aspirin, 81 mg, Oral, Daily  atorvastatin, 40 mg, Oral, Daily  cefTRIAXone, 1,000 mg, Intravenous, Q24H  furosemide, 40 mg, Oral, Daily  heparin (porcine), 5,000 Units, Subcutaneous, Q8H CHRISTIANO  insulin glargine, 15 Units, Subcutaneous, HS  insulin lispro, 1-6 Units, Subcutaneous, HS  insulin lispro, 2-12 Units, Subcutaneous, TID AC  lisinopril, 20 mg, Oral, Daily  metoprolol succinate, 50 mg, Oral, Daily  pantoprazole, 40 mg, Oral, Early Morning  vancomycin, 1,000 mg, Intravenous, Q12H      Continuous IV Infusions:     PRN  Meds:  ondansetron, 4 mg, Intravenous, Q6H PRN        IP CONSULT TO CASE MANAGEMENT  IP CONSULT TO PODIATRY  IP CONSULT TO CARDIOLOGY  IP CONSULT TO PHARMACY    Network Utilization Review Department  ATTENTION: Please call with any questions or concerns to [phone] and carefully listen to the prompts so that you are directed to the right person. All voicemails are confidential.   For Discharge needs, contact Care Management DC Support Team at [phone] opt. 2  Send all requests for admission clinical reviews, approved or denied determinations and any other requests to dedicated fax number below belonging to the campus where the patient is receiving treatment. List of dedicated fax numbers for the Facilities:  FACILITY NAME UR FAX NUMBER   ADMISSION DENIALS (Administrative/Medical Necessity) [phone]   DISCHARGE SUPPORT TEAM (NETWORK) [phone]   PARENT CHILD HEALTH (Maternity/NICU/Pediatrics) [phone]   [facility] [phone]   [facility] [phone]   Psychiatric hospital [phone]   [facility] [phone]   [facility] [phone]   [facility] [phone]   [facility] [phone]   [facility] [phone]   [facility] [phone]   [facility] [phone]   [facility] [phone]   [facility] [phone]

## 2024-09-03 NOTE — ASSESSMENT & PLAN NOTE
Baseline crt 1.1-1.25  Presenting with crt 1.57  Likely secondary to chf exacerbation   Now back to baseline continue to monitor

## 2024-09-03 NOTE — ASSESSMENT & PLAN NOTE
Patient called requesting to speak to Dr. Tran or his nurse regarding urinary frequency. Offered appointment next week with Dr. Tran, however patient refused and requested a call back. Please advise.    Trop >1200, suspect nonischemic myocardial injury but in setting of mod/severe AS may need to consider additional testing   Cardiology following  Follow-up echo to rule out ischemia

## 2024-09-03 NOTE — ASSESSMENT & PLAN NOTE
Right foot ulcer initially caused by brown recluse spider resulting in osteomyelitis and right 3-digit amputation   Also with left plantar ulcer  High suspicion for osteomyelitis,, follow-up MRI  Patient endorses history of MRSA infections in the past.  Continue with vancomycin for now  Podiatry consult-followed previously by podiatrist at [facility], has not established with one in this area

## 2024-09-03 NOTE — ASSESSMENT & PLAN NOTE
Likely secondary to CHF exacerbation   CT negative for PE with overall appearance of volume overload  Required BIPAP for a short period of time   Since resolved now saturating adequately on room air

## 2024-09-03 NOTE — CONSULTS
Consultation - Podiatric Surgery    Zain Gayle 59 y.o. male MRN: [id number]  Unit/Bed#: ICU 12 Encounter: [id number]      Assessment & Plan     Assessment:  59M diabetic presenting with B/L foot wounds, possible osteomyelitis.    Plan:  -Patient seen & examined in the ICU this morning. Clinical images are consistent with today's presentation.  -RIGHT foot: X-ray read as possible osteo of the right 3rd met head, possible cortical erosions of the right 2nd & 1st metatarsal remnants. Right foot wound does not probe to bone but there is malodor present.   -LEFT foot: X-ray read was negative for osteo of the left hallux at site of ulcer. Left hallux wound does not probe to bone but it is deep through the soft tissue level.  -Pending B/L foot MRIs for surgical planning. Spoke to the patient regarding debridements & possible bone resections pending these results. Patient is in agreement of this plan.  -NALDO/PVRs ordered for vascular assessment.  -The right foot wound & left hallux wound were sharply debrided through the subcutaneous level using a 10 surgical blade. Removal of all non-viable soft tissue was achieved.   -I am tentatively scheduling the patient for Friday morning right foot 3rd ray resection/debridement, possible left hallux amputation. This is pending MRI reads.  -Requesting Cardiology clearance/risk stratification for OR anesthesia.   -Podiatry will continue to follow.    Thank you for this consultation.        History of Present Illness   Physician Requesting Consult: Landon Dasilva MD  Reason for Consult / Principal Problem: B/L foot wounds  HPI: Zain Gayle is a 59 y.o. year old male who presents with bilateral foot wounds. States he had surgery on this right foot 3 separate times over the past 3 years in [place] and in [place]. Patient is in process of moving to this area and his truck broke down. He believes he will be in the area until his truck is fixed. Patient was originally admitted for  "chest pain, foot wounds have been present over the past month he states.     Inpatient consult to Podiatry  Consult performed by: Robb Kim DPM  Consult ordered by: CHERIE Bates          Review of Systems    Historical Information   Past Medical History:   Diagnosis Date    Allergic     Arthritis     Diabetes mellitus (HCC)     Hypertension     MRSA exposure      Past Surgical History:   Procedure Laterality Date    CARPAL TUNNEL RELEASE      KNEE SURGERY      TOE AMPUTATION       Social History   Social History     Substance and Sexual Activity   Alcohol Use Not Currently     Social History     Substance and Sexual Activity   Drug Use Not on file     E-Cigarette/Vaping    E-Cigarette Use Never User      E-Cigarette/Vaping Substances     Social History     Tobacco Use   Smoking Status Former   Smokeless Tobacco Never     Family History: non-contributory    Meds/Allergies   all current active meds have been reviewed  No Known Allergies    Objective   Vitals: Blood pressure 125/65, pulse (!) 54, temperature 97.6 °F (36.4 °C), temperature source Oral, resp. rate 18, height 5' 11\" (1.803 m), weight 101 kg (222 lb), SpO2 94%.,Body mass index is 30.96 kg/m².      Intake/Output Summary (Last 24 hours) at 9/3/2024 1051  Last data filed at 9/3/2024 1001  Gross per 24 hour   Intake 400 ml   Output 2325 ml   Net -1925 ml     I/O last 24 hours:  In: 400 [IV Piggyback:400]  Out: 2925 [Urine:2925]    Invasive Devices       Peripheral Intravenous Line  Duration             Peripheral IV 09/01/24 Dorsal (posterior);Right Forearm 2 days                    Physical Exam  Ortho Exam    Vascular:   -DP pulse is weakly palpable B/L, PT pulse is palpable B/L   -Capillary refill time is less than 3 seconds B/L  -Pedal hair growth is diminished B/L  -Temperature is warm to cool from ankle to toes B/L   -There is mild edema noted to the B/L LE    Neuro:  -Light touch and protective sensation is diminished    Ortho:  -Right " foot digital amputations 1-3.    Derm:  -Wound #1: RIGHT foot submet 3 Grade 2 ulceration measures pre & post debridement at 1.5 x 1.5 x 0.8 cm. Probes deep but not to bone. Malodor present. Serous drainage, no purulence. There is minimal periwound erythema/calor, hyperkeratotic tissue present.   -Wound #2: LEFT plantar hallux Grade 2 ulceration measures pre & post debridement at 1.0 x 1.0 x 0.6 cm. Probes deep but not to bone. No malodor present. Serous drainage, no purulence.      Lab Results:   Results from last 7 days   Lab Units 09/03/24  0526 09/02/24  0545   WBC Thousand/uL 11.13* 16.72*   HEMOGLOBIN g/dL 11.5* 11.5*   HEMATOCRIT % 36.4* 35.3*   PLATELETS Thousands/uL 284 286   SEGS PCT %  --  88*   LYMPHO PCT %  --  7*   MONO PCT %  --  4   EOS PCT %  --  0       Imaging Studies: I have personally reviewed pertinent reports.        Code Status: Level 1 - Full Code    Robb Kim DPM  [facility]  [address], [place]

## 2024-09-03 NOTE — PHYSICAL THERAPY NOTE
Physical Therapy Cancellation Note    PT order received. Chart review performed. At this time, PT evaluation cancelled as patient is pending MRI of b/l feet and podiatry work up. PT to continue to follow and evaluate when appropriate.     09/03/24 0708   PT Last Visit   PT Visit Date 09/03/24   Note Type   Note type Evaluation;Cancelled Session   Cancel Reasons Medical status       Katelin Beth, PT, DPT

## 2024-09-03 NOTE — PLAN OF CARE
Problem: PAIN - ADULT  Goal: Verbalizes/displays adequate comfort level or baseline comfort level  Description: Interventions:  - Encourage patient to monitor pain and request assistance  - Assess pain using appropriate pain scale  - Administer analgesics based on type and severity of pain and evaluate response  - Implement non-pharmacological measures as appropriate and evaluate response  - Consider cultural and social influences on pain and pain management  - Notify physician/advanced practitioner if interventions unsuccessful or patient reports new pain  Outcome: Progressing     Problem: INFECTION - ADULT  Goal: Absence or prevention of progression during hospitalization  Description: INTERVENTIONS:  - Assess and monitor for signs and symptoms of infection  - Monitor lab/diagnostic results  - Monitor all insertion sites, i.e. indwelling lines, tubes, and drains  - Monitor endotracheal if appropriate and nasal secretions for changes in amount and color  - [place]in[place] appropriate cooling/warming therapies per order  - Administer medications as ordered  - Instruct and encourage patient and family to use good hand hygiene technique  - Identify and instruct in appropriate isolation precautions for identified infection/condition  Outcome: Progressing  Goal: Absence of fever/infection during neutropenic period  Description: INTERVENTIONS:  - Monitor WBC    Outcome: Progressing     Problem: SAFETY ADULT  Goal: Patient will remain free of falls  Description: INTERVENTIONS:  - Educate patient/family on patient safety including physical limitations  - Instruct patient to call for assistance with activity   - Consult OT/PT to assist with strengthening/mobility   - Keep Call bell within reach  - Keep bed low and locked with side rails adjusted as appropriate  - Keep care items and personal belongings within reach  - Initiate and maintain comfort rounds  - Make Fall Risk Sign visible to staff  - Apply yellow socks and bracelet  for high fall risk patients  - Consider moving patient to room near nurses station  Outcome: Progressing  Goal: Maintain or return to baseline ADL function  Description: INTERVENTIONS:  -  Assess patient's ability to carry out ADLs; assess patient's baseline for ADL function and identify physical deficits which impact ability to perform ADLs (bathing, care of mouth/teeth, toileting, grooming, dressing, etc.)  - Assess/evaluate cause of self-care deficits   - Assess range of motion  - Assess patient's mobility; develop plan if impaired  - Assess patient's need for assistive devices and provide as appropriate  - Encourage maximum independence but intervene and supervise when necessary  - Involve family in performance of ADLs  - Assess for home care needs following discharge   - Consider OT consult to assist with ADL evaluation and planning for discharge  - Provide patient education as appropriate  Outcome: Progressing  Goal: Maintains/Returns to pre admission functional level  Description: INTERVENTIONS:  - Perform AM-PAC 6 Click Basic Mobility/ Daily Activity assessment daily.  - Set and communicate daily mobility goal to care team and patient/family/caregiver.   - Collaborate with rehabilitation services on mobility goals if consulted  - Out of bed for toileting  - Record patient progress and toleration of activity level   Outcome: Progressing     Problem: DISCHARGE PLANNING  Goal: Discharge to home or other facility with appropriate resources  Description: INTERVENTIONS:  - Identify barriers to discharge w/patient and caregiver  - Arrange for needed discharge resources and transportation as appropriate  - Identify discharge learning needs (meds, wound care, etc.)  - Arrange for interpretive services to assist at discharge as needed  - Refer to Case Management Department for coordinating discharge planning if the patient needs post-hospital services based on physician/advanced practitioner order or complex needs  related to functional status, cognitive ability, or social support system  Outcome: Progressing     Problem: Knowledge Deficit  Goal: Patient/family/caregiver demonstrates understanding of disease process, treatment plan, medications, and discharge instructions  Description: Complete learning assessment and assess knowledge base.  Interventions:  - Provide teaching at level of understanding  - Provide teaching via preferred learning methods  Outcome: Progressing     Problem: Nutrition/Hydration-ADULT  Goal: Nutrient/Hydration intake appropriate for improving, restoring or maintaining nutritional needs  Description: Monitor and assess patient's nutrition/hydration status for malnutrition. Collaborate with interdisciplinary team and initiate plan and interventions as ordered.  Monitor patient's weight and dietary intake as ordered or per policy. Utilize nutrition screening tool and intervene as necessary. Determine patient's food preferences and provide high-protein, high-caloric foods as appropriate.     INTERVENTIONS:  - Monitor oral intake, urinary output, labs, and treatment plans  - Assess nutrition and hydration status and recommend course of action  - Evaluate amount of meals eaten  - Assist patient with eating if necessary   - Allow adequate time for meals  - Recommend/ encourage appropriate diets, oral nutritional supplements, and vitamin/mineral supplements  - Order, calculate, and assess calorie counts as needed  - Recommend, monitor, and adjust tube feedings and TPN/PPN based on assessed needs  - Assess need for intravenous fluids  - Provide specific nutrition/hydration education as appropriate  - Include patient/family/caregiver in decisions related to nutrition  Outcome: Progressing

## 2024-09-03 NOTE — ASSESSMENT & PLAN NOTE
HS troponin peak 1205  Recommend OP ischemic evaluation as patient is currently asymptomatic and undergoing evaluation for osteomyelitis

## 2024-09-03 NOTE — UTILIZATION REVIEW
NOTIFICATION OF INPATIENT ADMISSION   AUTHORIZATION REQUEST   SERVICING FACILITY:   [place] [address]  Tax ID: [id number]  NPI: [id number] ATTENDING PROVIDER:  Attending Name and NPI#: Landon Dasilva Md [[id number]]  Address: [address] [place] [address]  Phone: [phone]     ADMISSION INFORMATION:  Place of Service: Inpatient [facility]  Place of Service Code: 21  Inpatient Admission Date/Time: 9/1/24  6:00 AM  Discharge Date/Time: No discharge date for patient encounter.  Admitting Diagnosis Code/Description:  Shortness of breath [R06.02]  CHF (congestive heart failure) (HCC) [I50.9]  Hypoxia [R09.02]  Productive cough [R05.8]  SIRS (systemic inflammatory response syndrome) (HCC) [R65.10]  Acute respiratory failure with hypoxia (HCC) [J96.01]     UTILIZATION REVIEW CONTACT:  Laura Hearn, Utilization   Network Utilization Review Department  Phone: [phone]  Fax [phone]  Email: [email or web address]  Contact for approvals/pending authorizations, clinical reviews, and discharge.     PHYSICIAN ADVISORY SERVICES:  Medical Necessity Denial & Dvbi-vf-Oefe Review  Phone: [phone]  Fax: [phone]  Email: [email or web address]     DISCHARGE SUPPORT TEAM:  For Patients Discharge Needs & Updates  Phone: [phone] opt. 2 Fax: [phone]  Email: [email or web address]

## 2024-09-03 NOTE — ASSESSMENT & PLAN NOTE
Lab Results   Component Value Date    HGBA1C 9.8 (H) 09/01/2024       Recent Labs     09/02/24  2107 09/03/24  0713 09/03/24  1022 09/03/24  1451   POCGLU 330* 229* 296* 157*       Blood Sugar Average: Last 72 hrs:  (P) [id number]

## 2024-09-03 NOTE — ASSESSMENT & PLAN NOTE
Wt Readings from Last 3 Encounters:   09/03/24 101 kg (222 lb)   10/05/21 104 kg (229 lb)     Presented with acute onset shortness of breath  CT negative for PE with overall appearance of volume overload  Clinically, he appears fairly euvolemic  Resolved with IV diuretics  Since been transitioned to oral Lasix 40 daily  Follow-up echo

## 2024-09-03 NOTE — ASSESSMENT & PLAN NOTE
Wt Readings from Last 3 Encounters:   09/03/24 101 kg (222 lb)   10/05/21 104 kg (229 lb)   Responded well to Lasix 20 mg IV on admission.  Patient now appears euvolemic  Transition to patient's home dose of Lasix 40 mg p.o. daily.

## 2024-09-04 PROBLEM — L08.9 DIABETIC FOOT INFECTION  (HCC): Status: ACTIVE | Noted: 2024-09-01

## 2024-09-04 PROBLEM — E11.628 DIABETIC FOOT INFECTION  (HCC): Status: ACTIVE | Noted: 2024-09-01

## 2024-09-04 LAB
ANION GAP SERPL CALCULATED.3IONS-SCNC: 4 MMOL/L (ref 4–13)
BASOPHILS # BLD AUTO: 0.06 THOUSANDS/ÂΜL (ref 0–0.1)
BASOPHILS NFR BLD AUTO: 1 % (ref 0–1)
BUN SERPL-MCNC: 25 MG/DL (ref 5–25)
CALCIUM SERPL-MCNC: 8.9 MG/DL (ref 8.4–10.2)
CHLORIDE SERPL-SCNC: 101 MMOL/L (ref 96–108)
CO2 SERPL-SCNC: 30 MMOL/L (ref 21–32)
CREAT SERPL-MCNC: 0.81 MG/DL (ref 0.6–1.3)
EOSINOPHIL # BLD AUTO: 0.16 THOUSAND/ÂΜL (ref 0–0.61)
EOSINOPHIL NFR BLD AUTO: 2 % (ref 0–6)
ERYTHROCYTE [DISTWIDTH] IN BLOOD BY AUTOMATED COUNT: 14.1 % (ref 11.6–15.1)
GFR SERPL CREATININE-BSD FRML MDRD: 98 ML/MIN/1.73SQ M
GLUCOSE SERPL-MCNC: 165 MG/DL (ref 65–140)
GLUCOSE SERPL-MCNC: 185 MG/DL (ref 65–140)
GLUCOSE SERPL-MCNC: 206 MG/DL (ref 65–140)
GLUCOSE SERPL-MCNC: 207 MG/DL (ref 65–140)
GLUCOSE SERPL-MCNC: 222 MG/DL (ref 65–140)
HCT VFR BLD AUTO: 39.8 % (ref 36.5–49.3)
HGB BLD-MCNC: 12.7 G/DL (ref 12–17)
IMM GRANULOCYTES # BLD AUTO: 0.09 THOUSAND/UL (ref 0–0.2)
IMM GRANULOCYTES NFR BLD AUTO: 1 % (ref 0–2)
LYMPHOCYTES # BLD AUTO: 3.03 THOUSANDS/ÂΜL (ref 0.6–4.47)
LYMPHOCYTES NFR BLD AUTO: 31 % (ref 14–44)
MCH RBC QN AUTO: 27.2 PG (ref 26.8–34.3)
MCHC RBC AUTO-ENTMCNC: 31.9 G/DL (ref 31.4–37.4)
MCV RBC AUTO: 85 FL (ref 82–98)
MONOCYTES # BLD AUTO: 0.52 THOUSAND/ÂΜL (ref 0.17–1.22)
MONOCYTES NFR BLD AUTO: 5 % (ref 4–12)
NEUTROPHILS # BLD AUTO: 6.02 THOUSANDS/ÂΜL (ref 1.85–7.62)
NEUTS SEG NFR BLD AUTO: 60 % (ref 43–75)
NRBC BLD AUTO-RTO: 0 /100 WBCS
PLATELET # BLD AUTO: 296 THOUSANDS/UL (ref 149–390)
PMV BLD AUTO: 10.6 FL (ref 8.9–12.7)
POTASSIUM SERPL-SCNC: 4.1 MMOL/L (ref 3.5–5.3)
RBC # BLD AUTO: 4.67 MILLION/UL (ref 3.88–5.62)
SODIUM SERPL-SCNC: 135 MMOL/L (ref 135–147)
VANCOMYCIN SERPL-MCNC: 12.4 UG/ML (ref 10–20)
WBC # BLD AUTO: 9.88 THOUSAND/UL (ref 4.31–10.16)

## 2024-09-04 PROCEDURE — 80202 ASSAY OF VANCOMYCIN: CPT | Performed by: INTERNAL MEDICINE

## 2024-09-04 PROCEDURE — 82948 REAGENT STRIP/BLOOD GLUCOSE: CPT

## 2024-09-04 PROCEDURE — 99232 SBSQ HOSP IP/OBS MODERATE 35: CPT | Performed by: INTERNAL MEDICINE

## 2024-09-04 PROCEDURE — 99239 HOSP IP/OBS DSCHRG MGMT >30: CPT | Performed by: INTERNAL MEDICINE

## 2024-09-04 PROCEDURE — 80048 BASIC METABOLIC PNL TOTAL CA: CPT | Performed by: INTERNAL MEDICINE

## 2024-09-04 PROCEDURE — 93923 UPR/LXTR ART STDY 3+ LVLS: CPT | Performed by: INTERNAL MEDICINE

## 2024-09-04 PROCEDURE — 85025 COMPLETE CBC W/AUTO DIFF WBC: CPT | Performed by: INTERNAL MEDICINE

## 2024-09-04 RX ADMIN — INSULIN LISPRO 4 UNITS: 100 INJECTION, SOLUTION INTRAVENOUS; SUBCUTANEOUS at 07:40

## 2024-09-04 RX ADMIN — INSULIN LISPRO 4 UNITS: 100 INJECTION, SOLUTION INTRAVENOUS; SUBCUTANEOUS at 17:26

## 2024-09-04 RX ADMIN — ATORVASTATIN CALCIUM 40 MG: 40 TABLET, FILM COATED ORAL at 11:55

## 2024-09-04 RX ADMIN — VANCOMYCIN HYDROCHLORIDE 1250 MG: 5 INJECTION, POWDER, LYOPHILIZED, FOR SOLUTION INTRAVENOUS at 17:25

## 2024-09-04 RX ADMIN — INSULIN LISPRO 1 UNITS: 100 INJECTION, SOLUTION INTRAVENOUS; SUBCUTANEOUS at 21:41

## 2024-09-04 RX ADMIN — FUROSEMIDE 40 MG: 40 TABLET ORAL at 08:03

## 2024-09-04 RX ADMIN — INSULIN LISPRO 2 UNITS: 100 INJECTION, SOLUTION INTRAVENOUS; SUBCUTANEOUS at 12:21

## 2024-09-04 RX ADMIN — HEPARIN SODIUM 5000 UNITS: 5000 INJECTION INTRAVENOUS; SUBCUTANEOUS at 17:25

## 2024-09-04 RX ADMIN — CEFTRIAXONE SODIUM 1000 MG: 10 INJECTION, POWDER, FOR SOLUTION INTRAVENOUS at 05:03

## 2024-09-04 RX ADMIN — VANCOMYCIN HYDROCHLORIDE 1000 MG: 1 INJECTION, SOLUTION INTRAVENOUS at 06:05

## 2024-09-04 RX ADMIN — HEPARIN SODIUM 5000 UNITS: 5000 INJECTION INTRAVENOUS; SUBCUTANEOUS at 21:42

## 2024-09-04 RX ADMIN — AMLODIPINE BESYLATE 10 MG: 10 TABLET ORAL at 08:03

## 2024-09-04 RX ADMIN — HEPARIN SODIUM 5000 UNITS: 5000 INJECTION INTRAVENOUS; SUBCUTANEOUS at 05:03

## 2024-09-04 RX ADMIN — PANTOPRAZOLE SODIUM 40 MG: 40 TABLET, DELAYED RELEASE ORAL at 05:03

## 2024-09-04 RX ADMIN — ASPIRIN 81 MG: 81 TABLET, COATED ORAL at 08:03

## 2024-09-04 RX ADMIN — METOPROLOL SUCCINATE 50 MG: 50 TABLET, EXTENDED RELEASE ORAL at 11:55

## 2024-09-04 RX ADMIN — INSULIN GLARGINE 15 UNITS: 100 INJECTION, SOLUTION SUBCUTANEOUS at 21:42

## 2024-09-04 RX ADMIN — LISINOPRIL 5 MG: 5 TABLET ORAL at 08:03

## 2024-09-04 NOTE — ASSESSMENT & PLAN NOTE
Wt Readings from Last 3 Encounters:   09/04/24 101 kg (222 lb 0.1 oz)   10/05/21 104 kg (229 lb)     Presented with acute onset shortness of breath  CT negative for PE with overall appearance of volume overload  Clinically, he appears fairly euvolemic  Resolved with IV diuretics  Since been transitioned to oral Lasix 40 daily  Echo revealed normal EF

## 2024-09-04 NOTE — PROGRESS NOTES
"Progress Note - Podiatric Surgery   Zain Gayle 59 y.o. male MRN: [id number]  Unit/Bed#: ICU 12 Encounter: [id number]    Assessment:  59M diabetic presenting with B/L foot wounds, osteomyelitis    Plan:  -Patient seen & examined at bedside this afternoon.  -B/L foot MRI reviewed: Osteo confirmed of the LEFT hallux, right 3rd metatarsal and right 4th proximal phalanx bones.  -Due to MRI findings of osteomyelitis in the bones above, plan is to OR for LEFT HALLUX, RIGHT PARTIAL 3RD RAY RESECTION, AND RIGHT 4TH & 5TH TOE AMPUTATIONS. The rationale for the right 5th toe amputation is that this is the only remaining toe on the right foot. This would enable the patient have better ambulation and prevent impending ulcerations. Patient is agreeable to the plan above.   -Case has been requested for Friday AM.       Subjective: Patient resting comfortably in bed.    Vitals: Blood pressure 113/63, pulse 58, temperature 98 °F (36.7 °C), temperature source Oral, resp. rate 22, height 5' 11\" (1.803 m), weight 101 kg (222 lb 0.1 oz), SpO2 97%.,Body mass index is 30.96 kg/m².      Intake/Output Summary (Last 24 hours) at 9/4/2024 1545  Last data filed at 9/4/2024 0808  Gross per 24 hour   Intake --   Output 600 ml   Net -600 ml       Invasive Devices       Peripheral Intravenous Line  Duration             Peripheral IV 09/01/24 Dorsal (posterior);Right Forearm 3 days                    Physical Exam:   Vascular:   -DP pulse is weakly palpable B/L, PT pulse is palpable B/L   -Capillary refill time is less than 3 seconds B/L  -Pedal hair growth is diminished B/L  -Temperature is warm to cool from ankle to toes B/L   -There is mild edema noted to the B/L LE     Neuro:  -Light touch and protective sensation is diminished     Ortho:  -Right foot digital amputations 1-3.     Derm:  -Wound #1: RIGHT foot submet 3 Grade 2 ulceration measures pre & post debridement at 1.5 x 1.5 x 0.8 cm. Probes deep but not to bone. Malodor present. Serous " drainage, no purulence. There is minimal periwound erythema/calor, hyperkeratotic tissue present.   -Wound #2: LEFT plantar hallux Grade 2 ulceration measures pre & post debridement at 1.0 x 1.0 x 0.6 cm. Probes deep but not to bone. No malodor present. Serous drainage, no purulence.    Lab, Imaging and other studies: I have personally reviewed pertinent lab results.    Robb Kim DPM  [facility]  [facility] [address], [place], [place]

## 2024-09-04 NOTE — CASE MANAGEMENT
Case Management Assessment & Discharge Planning Note    Patient name Zain Gayle  Location ICU 12/ICU 12 MRN [id number]  : 1965 Date 2024       Current Admission Date: 2024  Current Admission Diagnosis:Acute respiratory failure with hypoxia (HCC)   Patient Active Problem List    Diagnosis Date Noted Date Diagnosed    Diabetic foot infection  (HCC) 2024     Acute exacerbation of CHF (congestive heart failure) (HCC) 10/15/2023     Acute respiratory failure with hypoxia (HCC) 10/15/2023     ODALYS (acute kidney injury) (HCC) 10/15/2023     Aortic valve stenosis 10/15/2023     CAD (coronary artery disease) 10/15/2023     Diabetic neuropathy associated with type 2 diabetes mellitus (HCC) 10/15/2023     Essential hypertension 10/15/2023     Gastroesophageal reflux disease without esophagitis 10/15/2023     Type 2 diabetes mellitus with hyperglycemia (HCC) 10/15/2023     Elevated troponin 01/15/2020       LOS (days): 3  Geometric Mean LOS (GMLOS) (days): 3.9  Days to GMLOS:0.4     OBJECTIVE:    Risk of Unplanned Readmission Score: 11.82         Current admission status: Inpatient       Preferred Pharmacy:   [id number] [address]  [address]  [facility] [address]  Phone: [phone] Fax: [phone]    Primary Care Provider: No primary care provider on file.    Primary Insurance: [facility] AND [facility]  Secondary Insurance:     ASSESSMENT:  Active Health Care Proxies    There are no active Health Care Proxies on file.       Advance Directives  Does patient have a Health Care POA?: No  Was patient offered paperwork?: Yes (provided)  Does patient have Advance Directives?: No  Was patient offered paperwork?: Yes (provided)  Primary Contact: enma damon (Sister)  [phone] (Mobile)         Readmission Root Cause  30 Day Readmission: No    Patient Information  Admitted from:: Home  Mental Status:  Alert  During Assessment patient was accompanied by: Not accompanied during assessment  Assessment information provided by:: Patient  Primary Caregiver: Self  Support Systems: Family members  County of Residence: [place]  What city do you live in[place]  Home entry access options. Select all that apply.: Ramp  Type of Current Residence: [facility]  Living Arrangements: Lives Alone  Is patient a ?: No    Activities of Daily Living Prior to Admission  Functional Status: Independent  Completes ADLs independently?: Yes  Ambulates independently?: Yes  Does patient use assisted devices?: Yes  Assisted Devices (DME) used: Shower Chair, Other (Comment) (glucometer)  Does patient currently own DME?: Yes  What DME does the patient currently own?: Shower Chair, Straight Cane, Other (Comment) (glucometer)  Does patient have a history of Outpatient Therapy (PT/OT)?: Yes  Does the patient have a history of Short-Term Rehab?: No  Does patient have a history of HHC?: No  Does patient currently have HHC?: No         Patient Information Continued  Income Source: Employed  Does patient have prescription coverage?: Yes (reports many of his meds are not covered;  has good rx cards but says they are not helpful toward some of his meds.  he therefore doesn't take the expensive non covered meds;  pt says his PCP is aware)  Does patient receive dialysis treatments?: No  Does patient have a history of substance abuse?: No  Does patient have a history of Mental Health Diagnosis?: No         Means of Transportation  Means of Transport to Appts:: Drives Self      Social Determinants of Health (SDOH)      Flowsheet Row Most Recent Value   Housing Stability    In the last 12 months, was there a time when you were not able to pay the mortgage or rent on time? N   In the past 12 months, how many times have you moved where you were living? 3   At any time in the past 12 months, were you homeless or living in a shelter (including now)? N  "  Transportation Needs    In the past 12 months, has lack of transportation kept you from medical appointments or from getting medications? no   In the past 12 months, has lack of transportation kept you from meetings, work, or from getting things needed for daily living? No   Food Insecurity    Within the past 12 months, you worried that your food would run out before you got the money to buy more. Never true   Within the past 12 months, the food you bought just didn't last and you didn't have money to get more. Never true   Utilities    In the past 12 months has the electric, gas, oil, or water company threatened to shut off services in your home? No            DISCHARGE DETAILS:    Discharge planning discussed with:: pt  Freedom of Choice: Yes  Comments - Freedom of Choice: Met w pt at bedside; introduced self and explained role of CM, including arranging DME, STR, home health, out pt tx.  Advised pt that CM will make appropriate referrals based on treatment team recommendations and MD orders, and he can consider recommended plan of care and choose from available vendors.  CM contacted family/caregiver?: No- see comments (pt alert and oriented adult)  Were Treatment Team discharge recommendations reviewed with patient/caregiver?:  (none at present)          Contacts  Patient Contacts: enma damon (Sister)  [phone] (Mobile)  Relationship to Patient:: Family    Requested Home Health Care         Is the patient interested in HHC at discharge?: No    DME Referral Provided  Referral made for DME?: No    Other Referral/Resources/Interventions Provided:  Referral Comments: Pt answered \"no\" to direct questions re: food insecurity and paying rent on time, but described difficulties when describing his lifestyle.  Will give him info on 211 and food pantries.  Pt also reports his old car is in the shop;  they are having difficulties finding parts for the auto, per pt.  Will provide [facility] application.  Pt says " hospital provided transportation after his last admission.  Pt reports he is renting a tiny house on his family's farm and has been in area for 3 months.  Prior to that, pt says he travelled between [place] and [place].  Will follow for other post d/c needs.         Treatment Team Recommendation: Home  Discharge Destination Plan:: Home  Transport at Discharge : Other (Comment) (TBD;  pt says his car is in the shop and hospital provided transportation after his last stay.)

## 2024-09-04 NOTE — ASSESSMENT & PLAN NOTE
Noted to have moderate aortic stenosis with fused valve on echocardiogram in 2023  Patient has been discussing valve replacement with his outpatient cardiology   Continue outpatient follow-up with cardiology

## 2024-09-04 NOTE — PHYSICAL THERAPY NOTE
Physical Therapy Screen    Patient's Name: Zain Gayle    Admitting Diagnosis  Shortness of breath [R06.02]  CHF (congestive heart failure) ([facility]) [I50.9]  Hypoxia [R09.02]  Productive cough [R05.8]  SIRS (systemic inflammatory response syndrome) ([facility]) [R65.10]  Acute respiratory failure with hypoxia (HCC) [J96.01]    Problem List  Patient Active Problem List   Diagnosis    Acute exacerbation of CHF (congestive heart failure) (HCC)    Acute respiratory failure with hypoxia (HCC)    ODALYS (acute kidney injury) (HCC)    Aortic valve stenosis    CAD (coronary artery disease)    Diabetic neuropathy associated with type 2 diabetes mellitus (HCC)    Elevated troponin    Essential hypertension    Gastroesophageal reflux disease without esophagitis    Type 2 diabetes mellitus with hyperglycemia (HCC)    Diabetic foot infection  (HCC)     Past Medical History  Past Medical History:   Diagnosis Date    Allergic     Arthritis     CHF (congestive heart failure) (HCC)     Coronary artery disease     Diabetes mellitus (HCC)     Diabetic foot ulcers (HCC)     Hypertension     Moderate aortic stenosis     MRSA exposure      Past Surgical History  Past Surgical History:   Procedure Laterality Date    CARPAL TUNNEL RELEASE      KNEE SURGERY      TOE AMPUTATION          09/04/24 1437   PT Last Visit   PT Visit Date 09/04/24   Note Type   Note type Screen       Chart reviewed. PT screen performed. Pt with AMPAC score of 23. Pt independently ambulating at this time. PT to discontinue PT orders at this time. Please re-consult PT, when appropriate, following further podiatry work-up/intervention.    Katelin Beth, PT, DPT

## 2024-09-04 NOTE — PROGRESS NOTES
Zain Gayle is a 59 y.o. male who is currently ordered Vancomycin IV with management by the Pharmacy Consult service.  Relevant clinical data and objective / subjective history reviewed.  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  BMP and CBC will be ordered per protocol. We will continue to follow the patient’s culture results and clinical progress daily.    Conchita Serrano, Pharmacist

## 2024-09-04 NOTE — ASSESSMENT & PLAN NOTE
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 Some marrow change also noted distal aspect of first proximal phalanx again suspicious for osteomyelitis and presumptive intervening intermetatarsal septic arthropathy\"   MRI of right foot \" Third submetatarsal skin ulceration with marrow change involving the residual third metatarsal consistent with osteomyelitis as described.  2. T1 replacement signal throughout the fourth proximal phalanx with corresponding increased T2 signal and post gadolinium enhancement, again consistent with osteomyelitis\"  Continue IV vancomycin  Plan for OR intervention on Friday by podiatry  "

## 2024-09-04 NOTE — PROGRESS NOTES
"Cardiology Progress Note - Zain Gayle 59 y.o. male MRN: [id number]    Unit/Bed#: ICU 12 Encounter: [id number]      Assessment/Plan:  1.  Acute HFpEF  Appears euvolemic  Continue oral diuretics: Lasix 40 daily  Daily weights, salt restriction, strict I's and O's  Net -6.6 L  Continue amlodipine, aspirin, Lipitor, lisinopril, Toprol    2.  Moderate to severe aortic stenosis  Will need outpatient evaluation for SAVR versus TAVR    3.  Hypertension  Stable, 113/63  Continue Lasix, lisinopril, Toprol, amlodipine    4.  Nonischemic myocardial injury  Secondary to ODALYS/acute heart failure    5.  Sepsis with possible lower extremity infection/wounds  MRI with osteomyelitis  Management per SLIM/podiatry  Patient is considered at least moderate risk from a cardiac standpoint for planned left hallux, right partial third ray resection, right fourth and fifth toe amputation    6.  Diabetes  7.  ODALYS: Resolved    Patient stable cardiac standpoint, will sign off.  Call if needed.      Subjective:   Patient seen and examined.  No significant events overnight. Im feeling much better. Denies cp    Objective:     Vitals: Blood pressure 113/63, pulse 58, temperature 98 °F (36.7 °C), temperature source Oral, resp. rate 22, height 5' 11\" (1.803 m), weight 101 kg (222 lb 0.1 oz), SpO2 97%., Body mass index is 30.96 kg/m².,   Orthostatic Blood Pressures      Flowsheet Row Most Recent Value   Blood Pressure 113/63 filed at 09/04/2024 1457   Patient Position - Orthostatic VS Sitting filed at 09/04/2024 1457              Intake/Output Summary (Last 24 hours) at 9/4/2024 1551  Last data filed at 9/4/2024 0808  Gross per 24 hour   Intake --   Output 600 ml   Net -600 ml         Physical Exam:  GEN: Alert and oriented x 3, in no acute distress.  Well appearing and well nourished.   HEENT: Sclera anicteric, conjunctivae pink, mucous membranes moist. Oropharynx clear.   NECK: Supple, no carotid bruits, no significant JVD. Trachea midline, no " thyromegaly.   HEART: Regular rhythm, normal S1 and S2, +murmurs, No clicks, gallops or rubs. PMI nondisplaced, no thrills.   LUNGS: Clear to auscultation bilaterally; no wheezes, rales, or rhonchi. No increased work of breathing or signs of respiratory distress.   ABDOMEN: Soft, nontender, nondistended, normoactive bowel sounds.   EXTREMITIES: + Left foot wound, skin warm and well perfused, no clubbing, cyanosis, or edema.  NEURO: No focal findings. Normal speech. Mood and affect normal.   SKIN: Normal without suspicious lesions on exposed skin.      Medications:      Current Facility-Administered Medications:     amLODIPine (NORVASC) tablet 10 mg, 10 mg, Oral, Daily, CHERIE Bates, 10 mg at 09/04/24 0803    aspirin (ECOTRIN LOW STRENGTH) EC tablet 81 mg, 81 mg, Oral, Daily, CHERIE Bates, 81 mg at 09/04/24 0803    atorvastatin (LIPITOR) tablet 40 mg, 40 mg, Oral, Daily, CHERIE Bates, 40 mg at 09/04/24 1155    cefTRIAXone (ROCEPHIN) 1,000 mg in dextrose 5 % 50 mL IVPB, 1,000 mg, Intravenous, Q24H, CHERIE Bates, Last Rate: 100 mL/hr at 09/04/24 0503, 1,000 mg at 09/04/24 0503    furosemide (LASIX) tablet 40 mg, 40 mg, Oral, Daily, CHERIE Bates, 40 mg at 09/04/24 0803    heparin (porcine) subcutaneous injection 5,000 Units, 5,000 Units, Subcutaneous, Q8H CHRISTIANO, 5,000 Units at 09/04/24 0503 **AND** [COMPLETED] Platelet count, , , Once, CHERIE Ortega    insulin glargine (LANTUS) subcutaneous injection 15 Units 0.15 mL, 15 Units, Subcutaneous, HS, Sergo Christine MD, 15 Units at 09/03/24 2106    insulin lispro (HumALOG/ADMELOG) 100 units/mL subcutaneous injection 1-6 Units, 1-6 Units, Subcutaneous, HS, CHERIE Bates, 5 Units at 09/03/24 2106    insulin lispro (HumALOG/ADMELOG) 100 units/mL subcutaneous injection 2-12 Units, 2-12 Units, Subcutaneous, TID AC, 2 Units at 09/04/24 1221 **AND** Fingerstick Glucose (POCT), , , TID FRANCO, CHERIE Bates    lisinopril  (ZESTRIL) tablet 5 mg, 5 mg, Oral, Daily, Charles Persaud PA-C, 5 mg at 09/04/24 0803    metoprolol succinate (TOPROL-XL) 24 hr tablet 50 mg, 50 mg, Oral, Daily, CHERIE Bates, 50 mg at 09/04/24 1155    ondansetron (ZOFRAN) injection 4 mg, 4 mg, Intravenous, Q6H PRN, CHERIE Bates, 4 mg at 09/01/24 1130    pantoprazole (PROTONIX) EC tablet 40 mg, 40 mg, Oral, Early Morning, CHERIE Bates, 40 mg at 09/04/24 0503    vancomycin (VANCOCIN) 1,250 mg in sodium chloride 0.9 % 250 mL IVPB, 1,250 mg, Intravenous, Q12H, Landon Dasilva MD     Labs & Results:    Results from last 7 days   Lab Units 09/01/24  0800 09/01/24  0547 09/01/24  0351   HS TNI 0HR ng/L  --   --  54*   HS TNI 2HR ng/L  --  105*  --    HS TNI 4HR ng/L 480*  --   --    HSTNI D4 ng/L 426*  --   --      Results from last 7 days   Lab Units 09/04/24  0502 09/03/24  0526 09/02/24  0545   WBC Thousand/uL 9.88 11.13* 16.72*   HEMOGLOBIN g/dL 12.7 11.5* 11.5*   HEMATOCRIT % 39.8 36.4* 35.3*   PLATELETS Thousands/uL 296 284 286         Results from last 7 days   Lab Units 09/04/24  0502 09/03/24  0526 09/02/24  0545 09/01/24  0351   POTASSIUM mmol/L 4.1 4.2 4.2 3.6   CHLORIDE mmol/L 101 104 100 101   CO2 mmol/L 30 27 25 24   BUN mg/dL 25 31* 34* 31*   CREATININE mg/dL 0.81 0.91 1.05 1.57*   CALCIUM mg/dL 8.9 8.4 8.6 9.2   ALK PHOS U/L  --   --  95 124*   ALT U/L  --   --  13 15   AST U/L  --   --  13 16         Results from last 7 days   Lab Units 09/02/24  0545   MAGNESIUM mg/dL 1.9

## 2024-09-04 NOTE — DISCHARGE SUMMARY
"[facility]  Discharge- Zain Gayle 1965, 59 y.o. male MRN: [id number]  Unit/Bed#: ICU 12 Encounter: [id number]  Primary Care Provider: No primary care provider on file.   Date and time admitted to hospital: 9/1/2024  3:43 AM    * Acute respiratory failure with hypoxia (HCC)  Assessment & Plan  Likely secondary to CHF exacerbation   CT negative for PE with overall appearance of volume overload  Required BIPAP for a short period of time   Since resolved now saturating adequately on room air    Diabetic foot infection  (HCC)  Assessment & Plan  Right foot ulcer initially caused by brown recluse spider resulting in osteomyelitis and right 3-digit amputation   Also with left plantar ulcer  High suspicion for osteomyelitis,  MRI of left foot revealed \"Plantar skin ulceration great toe with marrow change first distal phalanx consistent with osteomyelitis. Some marrow change also noted distal aspect of first proximal phalanx again suspicious for osteomyelitis and presumptive intervening intermetatarsal septic arthropathy\"   MRI of right foot \" Third submetatarsal skin ulceration with marrow change involving the residual third metatarsal consistent with osteomyelitis as described.  2. T1 replacement signal throughout the fourth proximal phalanx with corresponding increased T2 signal and post gadolinium enhancement, again consistent with osteomyelitis\"  Continue IV vancomycin  Plan for OR intervention on Friday by podiatry    Type 2 diabetes mellitus with hyperglycemia (HCC)  Assessment & Plan  Continue Lantus 15 units nightly  Sliding-scale insulin  Monitor blood glucose    Gastroesophageal reflux disease without esophagitis  Assessment & Plan  Continue PPI    Essential hypertension  Assessment & Plan  BP controlled  Continue Lasix, lisinopril, amlodipine, beta-blocker    Elevated troponin  Assessment & Plan  Trop >1200, suspect nonischemic myocardial injury but in setting of mod/severe AS may " need to consider additional testing   Cardiology following  Echo ruled out ischemia  Currently without chest pain  No need for further evaluation at this time  Appreciate cardiology recommendations    Aortic valve stenosis  Assessment & Plan  Noted to have moderate aortic stenosis with fused valve on echocardiogram in 2023  Patient has been discussing valve replacement with his outpatient cardiology   Continue outpatient follow-up with cardiology    ODALYS (acute kidney injury) ([facility])  Assessment & Plan  Baseline crt 1.1-1.25  Presenting with crt 1.57  Likely secondary to chf exacerbation   Now back to baseline continue to monitor    Acute exacerbation of CHF (congestive heart failure) ([facility])  Assessment & Plan  Wt Readings from Last 3 Encounters:   09/04/24 101 kg (222 lb 0.1 oz)   10/05/21 104 kg (229 lb)     Presented with acute onset shortness of breath  CT negative for PE with overall appearance of volume overload  Clinically, he appears fairly euvolemic  Resolved with IV diuretics  Since been transitioned to oral Lasix 40 daily  Echo revealed normal EF    VTE Pharmacologic Prophylaxis:   Pharmacologic: Heparin  Mechanical VTE Prophylaxis in Place: Yes    Patient Centered Rounds: I have performed bedside rounds with nursing staff today.    Discussions with Specialists or Other Care Team Provider: cm, nursing    Education and Discussions with Family / Patient: pt    Time Spent for Care: 30 minutes.  More than 50% of total time spent on counseling and coordination of care as described above.    Current Length of Stay: 3 day(s)    Current Patient Status: Inpatient   Certification Statement: The patient will continue to require additional inpatient hospital stay due to see below    Discharge Plan: Pending OR intervention by podiatry.  Anticipate greater than 48 hours    Code Status: Level 1 - Full Code      Subjective:   Denies chest pain, shortness breath, cough or fevers, chills    Objective:     Vitals:   Temp  (24hrs), Av.8 °F (36.6 °C), Min:97.7 °F (36.5 °C), Max:97.8 °F (36.6 °C)    Temp:  [97.7 °F (36.5 °C)-97.8 °F (36.6 °C)] 97.7 °F (36.5 °C)  HR:  [57-65] 65  Resp:  [18-22] 22  BP: (123-135)/(64-65) 135/65  SpO2:  [97 %-99 %] 97 %  Body mass index is 30.96 kg/m².     Input and Output Summary (last 24 hours):       Intake/Output Summary (Last 24 hours) at 2024 1023  Last data filed at 2024 0808  Gross per 24 hour   Intake 480 ml   Output 1500 ml   Net -1020 ml       Physical Exam:     Physical Exam  Constitutional:       General: He is not in acute distress.     Appearance: He is well-developed. He is not diaphoretic.   HENT:      Head: Normocephalic and atraumatic.      Nose: Nose normal.      Mouth/Throat:      Pharynx: No oropharyngeal exudate.   Eyes:      General: No scleral icterus.     Conjunctiva/sclera: Conjunctivae normal.   Cardiovascular:      Rate and Rhythm: Normal rate and regular rhythm.      Heart sounds: Normal heart sounds. No murmur heard.     No friction rub. No gallop.   Pulmonary:      Effort: Pulmonary effort is normal. No respiratory distress.      Breath sounds: Normal breath sounds. No wheezing or rales.   Chest:      Chest wall: No tenderness.   Abdominal:      General: Bowel sounds are normal. There is no distension.      Palpations: Abdomen is soft.      Tenderness: There is no abdominal tenderness. There is no guarding.   Musculoskeletal:         General: No tenderness, deformity or edema. Normal range of motion.      Cervical back: Normal range of motion and neck supple.   Skin:     General: Skin is warm and dry.      Findings: No erythema.   Neurological:      Mental Status: He is alert. Mental status is at baseline.   Psychiatric:         Mood and Affect: Mood and affect normal.         Additional Data:     Labs:    Results from last 7 days   Lab Units 24  0502   WBC Thousand/uL 9.88   HEMOGLOBIN g/dL 12.7   HEMATOCRIT % 39.8   PLATELETS Thousands/uL 296   SEGS PCT %  60   LYMPHO PCT % 31   MONO PCT % 5   EOS PCT % 2     Results from last 7 days   Lab Units 09/04/24  0502 09/03/24  0526 09/02/24  0545   SODIUM mmol/L 135   < > 132*   POTASSIUM mmol/L 4.1   < > 4.2   CHLORIDE mmol/L 101   < > 100   CO2 mmol/L 30   < > 25   BUN mg/dL 25   < > 34*   CREATININE mg/dL 0.81   < > 1.05   ANION GAP mmol/L 4   < > 7   CALCIUM mg/dL 8.9   < > 8.6   ALBUMIN g/dL  --   --  3.1*   TOTAL BILIRUBIN mg/dL  --   --  0.39   ALK PHOS U/L  --   --  95   ALT U/L  --   --  13   AST U/L  --   --  13   GLUCOSE RANDOM mg/dL 207*   < > 375*    < > = values in this interval not displayed.         Results from last 7 days   Lab Units 09/04/24  0728 09/03/24  2055 09/03/24  1547 09/03/24  1451 09/03/24  1022 09/03/24  0713 09/02/24  2107 09/02/24  1605 09/02/24  1102 09/02/24  0723 09/01/24  2048 09/01/24  1546   POC GLUCOSE mg/dl 222* 312* 130 157* 296* 229* 330* 212* 309* 324* 361* 309*     Results from last 7 days   Lab Units 09/01/24  0756   HEMOGLOBIN A1C % 9.8*     Results from last 7 days   Lab Units 09/02/24  0545 09/01/24  0547   LACTIC ACID mmol/L  --  1.7   PROCALCITONIN ng/ml 0.24 0.09           * I Have Reviewed All Lab Data Listed Above.  * Additional Pertinent Lab Tests Reviewed: All Labs Within Last 24 Hours Reviewed    Imaging:    Imaging Reports Reviewed Today Include: na  Imaging Personally Reviewed by Myself Includes:  na    Recent Cultures (last 7 days):     Results from last 7 days   Lab Units 09/01/24  0557 09/01/24  0547   BLOOD CULTURE  No Growth at 48 hrs. No Growth at 48 hrs.       Last 24 Hours Medication List:   Current Facility-Administered Medications   Medication Dose Route Frequency Provider Last Rate    amLODIPine  10 mg Oral Daily CHERIE Bates      aspirin  81 mg Oral Daily CHERIE Bates      atorvastatin  40 mg Oral Daily CHERIE Bates      cefTRIAXone  1,000 mg Intravenous Q24H CHERIE Bates 1,000 mg (09/04/24 0503)    furosemide  40 mg Oral  Daily CHERIE Bates      heparin (porcine)  5,000 Units Subcutaneous Q8H CHRISTIANO CHERIE Bates      insulin glargine  15 Units Subcutaneous HS Sergo Christine MD      insulin lispro  1-6 Units Subcutaneous HS CHERIE Bates      insulin lispro  2-12 Units Subcutaneous TID AC CHERIE Bates      lisinopril  5 mg Oral Daily Charles Persaud PA-C      metoprolol succinate  50 mg Oral Daily CHERIE Bates      ondansetron  4 mg Intravenous Q6H PRN CHERIE Bates      pantoprazole  40 mg Oral Early Morning CHERIE Bates      vancomycin  1,250 mg Intravenous Q12H Landon Dasilva MD          Today, Patient Was Seen By: Landon Dasilva MD    ** Please Note: Dictation voice to text software may have been used in the creation of this document. **

## 2024-09-04 NOTE — PLAN OF CARE
Problem: PAIN - ADULT  Goal: Verbalizes/displays adequate comfort level or baseline comfort level  Description: Interventions:  - Encourage patient to monitor pain and request assistance  - Assess pain using appropriate pain scale  - Administer analgesics based on type and severity of pain and evaluate response  - Implement non-pharmacological measures as appropriate and evaluate response  - Consider cultural and social influences on pain and pain management  - Notify physician/advanced practitioner if interventions unsuccessful or patient reports new pain  Outcome: Progressing     Problem: INFECTION - ADULT  Goal: Absence or prevention of progression during hospitalization  Description: INTERVENTIONS:  - Assess and monitor for signs and symptoms of infection  - Monitor lab/diagnostic results  - Monitor all insertion sites, i.e. indwelling lines, tubes, and drains  - Monitor endotracheal if appropriate and nasal secretions for changes in amount and color  - [place] appropriate cooling/warming therapies per order  - Administer medications as ordered  - Instruct and encourage patient and family to use good hand hygiene technique  - Identify and instruct in appropriate isolation precautions for identified infection/condition  Outcome: Progressing  Goal: Absence of fever/infection during neutropenic period  Description: INTERVENTIONS:  - Monitor WBC    Outcome: Progressing     Problem: SAFETY ADULT  Goal: Patient will remain free of falls  Description: INTERVENTIONS:  - Educate patient/family on patient safety including physical limitations  - Instruct patient to call for assistance with activity   - Consult OT/PT to assist with strengthening/mobility   - Keep Call bell within reach  - Keep bed low and locked with side rails adjusted as appropriate  - Keep care items and personal belongings within reach  - Initiate and maintain comfort rounds  - Make Fall Risk Sign visible to staff  - Apply yellow socks and bracelet  for high fall risk patients  - Consider moving patient to room near nurses station  Outcome: Progressing  Goal: Maintain or return to baseline ADL function  Description: INTERVENTIONS:  -  Assess patient's ability to carry out ADLs; assess patient's baseline for ADL function and identify physical deficits which impact ability to perform ADLs (bathing, care of mouth/teeth, toileting, grooming, dressing, etc.)  - Assess/evaluate cause of self-care deficits   - Assess range of motion  - Assess patient's mobility; develop plan if impaired  - Assess patient's need for assistive devices and provide as appropriate  - Encourage maximum independence but intervene and supervise when necessary  - Involve family in performance of ADLs  - Assess for home care needs following discharge   - Consider OT consult to assist with ADL evaluation and planning for discharge  - Provide patient education as appropriate  Outcome: Progressing  Goal: Maintains/Returns to pre admission functional level  Description: INTERVENTIONS:  - Perform AM-PAC 6 Click Basic Mobility/ Daily Activity assessment daily.  - Set and communicate daily mobility goal to care team and patient/family/caregiver.   - Collaborate with rehabilitation services on mobility goals if consulted  - Out of bed for toileting  - Record patient progress and toleration of activity level   Outcome: Progressing     Problem: DISCHARGE PLANNING  Goal: Discharge to home or other facility with appropriate resources  Description: INTERVENTIONS:  - Identify barriers to discharge w/patient and caregiver  - Arrange for needed discharge resources and transportation as appropriate  - Identify discharge learning needs (meds, wound care, etc.)  - Arrange for interpretive services to assist at discharge as needed  - Refer to Case Management Department for coordinating discharge planning if the patient needs post-hospital services based on physician/advanced practitioner order or complex needs  related to functional status, cognitive ability, or social support system  Outcome: Progressing     Problem: Knowledge Deficit  Goal: Patient/family/caregiver demonstrates understanding of disease process, treatment plan, medications, and discharge instructions  Description: Complete learning assessment and assess knowledge base.  Interventions:  - Provide teaching at level of understanding  - Provide teaching via preferred learning methods  Outcome: Progressing     Problem: Nutrition/Hydration-ADULT  Goal: Nutrient/Hydration intake appropriate for improving, restoring or maintaining nutritional needs  Description: Monitor and assess patient's nutrition/hydration status for malnutrition. Collaborate with interdisciplinary team and initiate plan and interventions as ordered.  Monitor patient's weight and dietary intake as ordered or per policy. Utilize nutrition screening tool and intervene as necessary. Determine patient's food preferences and provide high-protein, high-caloric foods as appropriate.     INTERVENTIONS:  - Monitor oral intake, urinary output, labs, and treatment plans  - Assess nutrition and hydration status and recommend course of action  - Evaluate amount of meals eaten  - Assist patient with eating if necessary   - Allow adequate time for meals  - Recommend/ encourage appropriate diets, oral nutritional supplements, and vitamin/mineral supplements  - Order, calculate, and assess calorie counts as needed  - Recommend, monitor, and adjust tube feedings and TPN/PPN based on assessed needs  - Assess need for intravenous fluids  - Provide specific nutrition/hydration education as appropriate  - Include patient/family/caregiver in decisions related to nutrition  Outcome: Progressing

## 2024-09-04 NOTE — OCCUPATIONAL THERAPY NOTE
Occupational Therapy Screen        Patient Name: Zain Gayle  Today's Date: 9/4/2024 09/04/24 1431   OT Last Visit   OT Visit Date 09/04/24   Note Type   Note type Screen  OT consult received and chart reviewed. Pt with [facility] of 24. He is undergoing workup with Podiatry for possible intervention/medical plan. Pt notes that he is independent with all mobility and ADLs at this time. OT will complete orders. Please re-consult if pt does have surgical intervention or a change in functional status.          Lydia Nicole, OTR/L

## 2024-09-04 NOTE — ASSESSMENT & PLAN NOTE
Trop >1200, suspect nonischemic myocardial injury but in setting of mod/severe AS may need to consider additional testing   Cardiology following  Echo ruled out ischemia  Currently without chest pain  No need for further evaluation at this time  Appreciate cardiology recommendations

## 2024-09-05 LAB
ANION GAP SERPL CALCULATED.3IONS-SCNC: 4 MMOL/L (ref 4–13)
BASOPHILS # BLD AUTO: 0.07 THOUSANDS/ÂΜL (ref 0–0.1)
BASOPHILS NFR BLD AUTO: 1 % (ref 0–1)
BUN SERPL-MCNC: 19 MG/DL (ref 5–25)
CALCIUM SERPL-MCNC: 8.8 MG/DL (ref 8.4–10.2)
CHLORIDE SERPL-SCNC: 102 MMOL/L (ref 96–108)
CO2 SERPL-SCNC: 30 MMOL/L (ref 21–32)
CREAT SERPL-MCNC: 0.83 MG/DL (ref 0.6–1.3)
EOSINOPHIL # BLD AUTO: 0.26 THOUSAND/ÂΜL (ref 0–0.61)
EOSINOPHIL NFR BLD AUTO: 3 % (ref 0–6)
ERYTHROCYTE [DISTWIDTH] IN BLOOD BY AUTOMATED COUNT: 13.7 % (ref 11.6–15.1)
GFR SERPL CREATININE-BSD FRML MDRD: 97 ML/MIN/1.73SQ M
GLUCOSE SERPL-MCNC: 136 MG/DL (ref 65–140)
GLUCOSE SERPL-MCNC: 155 MG/DL (ref 65–140)
GLUCOSE SERPL-MCNC: 216 MG/DL (ref 65–140)
GLUCOSE SERPL-MCNC: 220 MG/DL (ref 65–140)
GLUCOSE SERPL-MCNC: 247 MG/DL (ref 65–140)
HCT VFR BLD AUTO: 40.7 % (ref 36.5–49.3)
HGB BLD-MCNC: 12.9 G/DL (ref 12–17)
IMM GRANULOCYTES # BLD AUTO: 0.13 THOUSAND/UL (ref 0–0.2)
IMM GRANULOCYTES NFR BLD AUTO: 1 % (ref 0–2)
LYMPHOCYTES # BLD AUTO: 3.02 THOUSANDS/ÂΜL (ref 0.6–4.47)
LYMPHOCYTES NFR BLD AUTO: 30 % (ref 14–44)
MCH RBC QN AUTO: 27 PG (ref 26.8–34.3)
MCHC RBC AUTO-ENTMCNC: 31.7 G/DL (ref 31.4–37.4)
MCV RBC AUTO: 85 FL (ref 82–98)
MONOCYTES # BLD AUTO: 0.53 THOUSAND/ÂΜL (ref 0.17–1.22)
MONOCYTES NFR BLD AUTO: 5 % (ref 4–12)
NEUTROPHILS # BLD AUTO: 6.02 THOUSANDS/ÂΜL (ref 1.85–7.62)
NEUTS SEG NFR BLD AUTO: 60 % (ref 43–75)
NRBC BLD AUTO-RTO: 0 /100 WBCS
PLATELET # BLD AUTO: 305 THOUSANDS/UL (ref 149–390)
PMV BLD AUTO: 10.7 FL (ref 8.9–12.7)
POTASSIUM SERPL-SCNC: 4 MMOL/L (ref 3.5–5.3)
RBC # BLD AUTO: 4.77 MILLION/UL (ref 3.88–5.62)
SODIUM SERPL-SCNC: 136 MMOL/L (ref 135–147)
VANCOMYCIN SERPL-MCNC: 18.9 UG/ML (ref 10–20)
WBC # BLD AUTO: 10.03 THOUSAND/UL (ref 4.31–10.16)

## 2024-09-05 PROCEDURE — 80048 BASIC METABOLIC PNL TOTAL CA: CPT | Performed by: INTERNAL MEDICINE

## 2024-09-05 PROCEDURE — 80202 ASSAY OF VANCOMYCIN: CPT | Performed by: INTERNAL MEDICINE

## 2024-09-05 PROCEDURE — 99232 SBSQ HOSP IP/OBS MODERATE 35: CPT | Performed by: INTERNAL MEDICINE

## 2024-09-05 PROCEDURE — 85025 COMPLETE CBC W/AUTO DIFF WBC: CPT | Performed by: INTERNAL MEDICINE

## 2024-09-05 PROCEDURE — 82948 REAGENT STRIP/BLOOD GLUCOSE: CPT

## 2024-09-05 RX ADMIN — INSULIN GLARGINE 15 UNITS: 100 INJECTION, SOLUTION SUBCUTANEOUS at 21:27

## 2024-09-05 RX ADMIN — HEPARIN SODIUM 5000 UNITS: 5000 INJECTION INTRAVENOUS; SUBCUTANEOUS at 21:28

## 2024-09-05 RX ADMIN — LISINOPRIL 5 MG: 5 TABLET ORAL at 10:22

## 2024-09-05 RX ADMIN — INSULIN LISPRO 4 UNITS: 100 INJECTION, SOLUTION INTRAVENOUS; SUBCUTANEOUS at 16:44

## 2024-09-05 RX ADMIN — VANCOMYCIN HYDROCHLORIDE 1250 MG: 5 INJECTION, POWDER, LYOPHILIZED, FOR SOLUTION INTRAVENOUS at 03:05

## 2024-09-05 RX ADMIN — ASPIRIN 81 MG: 81 TABLET, COATED ORAL at 10:22

## 2024-09-05 RX ADMIN — HEPARIN SODIUM 5000 UNITS: 5000 INJECTION INTRAVENOUS; SUBCUTANEOUS at 05:50

## 2024-09-05 RX ADMIN — INSULIN LISPRO 3 UNITS: 100 INJECTION, SOLUTION INTRAVENOUS; SUBCUTANEOUS at 21:27

## 2024-09-05 RX ADMIN — INSULIN LISPRO 4 UNITS: 100 INJECTION, SOLUTION INTRAVENOUS; SUBCUTANEOUS at 12:59

## 2024-09-05 RX ADMIN — METOPROLOL SUCCINATE 50 MG: 50 TABLET, EXTENDED RELEASE ORAL at 10:23

## 2024-09-05 RX ADMIN — VANCOMYCIN HYDROCHLORIDE 1250 MG: 5 INJECTION, POWDER, LYOPHILIZED, FOR SOLUTION INTRAVENOUS at 16:43

## 2024-09-05 RX ADMIN — ATORVASTATIN CALCIUM 40 MG: 40 TABLET, FILM COATED ORAL at 10:22

## 2024-09-05 RX ADMIN — PANTOPRAZOLE SODIUM 40 MG: 40 TABLET, DELAYED RELEASE ORAL at 05:50

## 2024-09-05 RX ADMIN — CEFTRIAXONE SODIUM 1000 MG: 10 INJECTION, POWDER, FOR SOLUTION INTRAVENOUS at 05:50

## 2024-09-05 RX ADMIN — FUROSEMIDE 40 MG: 40 TABLET ORAL at 10:23

## 2024-09-05 RX ADMIN — HEPARIN SODIUM 5000 UNITS: 5000 INJECTION INTRAVENOUS; SUBCUTANEOUS at 16:43

## 2024-09-05 RX ADMIN — AMLODIPINE BESYLATE 10 MG: 10 TABLET ORAL at 10:23

## 2024-09-05 NOTE — PROGRESS NOTES
"[facility]  Progress Note  Name: Zain Gayle I  MRN: [id number]  Unit/Bed#: ICU 12 I Date of Admission: 9/1/2024   Date of Service: 9/5/2024 I Hospital Day: 4    Assessment & Plan   * Acute respiratory failure with hypoxia (HCC)  Assessment & Plan  Likely secondary to CHF exacerbation   CT negative for PE with overall appearance of volume overload  Required BIPAP for a short period of time   Since resolved now saturating adequately on room air    Diabetic foot infection  (HCC)  Assessment & Plan  Right foot ulcer initially caused by brown recluse spider resulting in osteomyelitis and right 3-digit amputation   Also with left plantar ulcer  High suspicion for osteomyelitis,  MRI of left foot revealed \"Plantar skin ulceration great toe with marrow change first distal phalanx consistent with osteomyelitis. Some marrow change also noted distal aspect of first proximal phalanx again suspicious for osteomyelitis and presumptive intervening intermetatarsal septic arthropathy\"   MRI of right foot \" Third submetatarsal skin ulceration with marrow change involving the residual third metatarsal consistent with osteomyelitis as described.  2. T1 replacement signal throughout the fourth proximal phalanx with corresponding increased T2 signal and post gadolinium enhancement, again consistent with osteomyelitis\"  Continue IV vancomycin  Plan for OR intervention on Friday by podiatry    Type 2 diabetes mellitus with hyperglycemia (HCC)  Assessment & Plan  Continue Lantus 15 units nightly  Sliding-scale insulin  Monitor blood glucose    Essential hypertension  Assessment & Plan  BP controlled  Continue Lasix, lisinopril, amlodipine, beta-blocker    Elevated troponin  Assessment & Plan  Trop >1200, suspect nonischemic myocardial injury but in setting of mod/severe AS may need to consider additional testing   Cardiology following  Echo ruled out ischemia  Currently without chest pain  No need for further " evaluation at this time  Appreciate cardiology recommendations    Aortic valve stenosis  Assessment & Plan  Noted to have moderate aortic stenosis with fused valve on echocardiogram in   Patient has been discussing valve replacement with his outpatient cardiology   Continue outpatient follow-up with cardiology    ODALYS (acute kidney injury) ([facility])  Assessment & Plan  Baseline crt 1.1-1.25  Presenting with crt 1.57  Likely secondary to chf exacerbation   Now back to baseline continue to monitor    Acute exacerbation of CHF (congestive heart failure) ([facility])  Assessment & Plan  Wt Readings from Last 3 Encounters:   24 101 kg (222 lb 7.1 oz)   10/05/21 104 kg (229 lb)     Presented with acute onset shortness of breath  CT negative for PE with overall appearance of volume overload  Clinically, he appears fairly euvolemic  Resolved with IV diuretics  Since been transitioned to oral Lasix 40 daily  Echo revealed normal EF         VTE Pharmacologic Prophylaxis:   Pharmacologic: Heparin  Mechanical VTE Prophylaxis in Place: Yes    Patient Centered Rounds: I have performed bedside rounds with nursing staff today.    Discussions with Specialists or Other Care Team Provider: cm, nursing    Education and Discussions with Family / Patient: pt,   declined family update    Time Spent for Care: t30 minutes.  More than 50% of total time spent on counseling and coordination of care as described above.    Current Length of Stay: 4 day(s)    Current Patient Status: Inpatient   Certification Statement: The patient will continue to require additional inpatient hospital stay due to below    Discharge Plan: Awaiting further evaluation and treatment by podiatry.  Anticipate greater than 48 hours  Code Status: Level 1 - Full Code      Subjective:   Denies chest pain, shortness of breath, cough, fevers, chills    Objective:     Vitals:   Temp (24hrs), Av.1 °F (36.7 °C), Min:98 °F (36.7 °C), Max:98.2 °F (36.8 °C)    Temp:  [98 °F  (36.7 °C)-98.2 °F (36.8 °C)] 98.2 °F (36.8 °C)  HR:  [44-58] 46  Resp:  [16-21] 16  BP: (113-135)/(58-69) 135/65  SpO2:  [95 %-98 %] 98 %  Body mass index is 31.02 kg/m².     Input and Output Summary (last 24 hours):       Intake/Output Summary (Last 24 hours) at 9/5/2024 1046  Last data filed at 9/5/2024 0700  Gross per 24 hour   Intake 1450 ml   Output 1600 ml   Net -150 ml       Physical Exam:     Physical Exam  Constitutional:       General: He is not in acute distress.     Appearance: He is well-developed. He is not diaphoretic.   HENT:      Head: Normocephalic and atraumatic.      Nose: Nose normal.      Mouth/Throat:      Pharynx: No oropharyngeal exudate.   Eyes:      General: No scleral icterus.     Conjunctiva/sclera: Conjunctivae normal.   Cardiovascular:      Rate and Rhythm: Normal rate and regular rhythm.      Heart sounds: Normal heart sounds. No murmur heard.     No friction rub. No gallop.   Pulmonary:      Effort: Pulmonary effort is normal. No respiratory distress.      Breath sounds: Normal breath sounds. No wheezing or rales.   Chest:      Chest wall: No tenderness.   Abdominal:      General: Bowel sounds are normal. There is no distension.      Palpations: Abdomen is soft.      Tenderness: There is no abdominal tenderness. There is no guarding.   Musculoskeletal:         General: No tenderness, deformity or edema. Normal range of motion.      Cervical back: Normal range of motion and neck supple.   Skin:     General: Skin is warm and dry.      Findings: No erythema.   Neurological:      Mental Status: He is alert. Mental status is at baseline.   Psychiatric:         Mood and Affect: Mood and affect normal.       (    Additional Data:     Labs:    Results from last 7 days   Lab Units 09/05/24  0519   WBC Thousand/uL 10.03   HEMOGLOBIN g/dL 12.9   HEMATOCRIT % 40.7   PLATELETS Thousands/uL 305   SEGS PCT % 60   LYMPHO PCT % 30   MONO PCT % 5   EOS PCT % 3     Results from last 7 days   Lab Units  09/05/24  0519 09/03/24  0526 09/02/24  0545   SODIUM mmol/L 136   < > 132*   POTASSIUM mmol/L 4.0   < > 4.2   CHLORIDE mmol/L 102   < > 100   CO2 mmol/L 30   < > 25   BUN mg/dL 19   < > 34*   CREATININE mg/dL 0.83   < > 1.05   ANION GAP mmol/L 4   < > 7   CALCIUM mg/dL 8.8   < > 8.6   ALBUMIN g/dL  --   --  3.1*   TOTAL BILIRUBIN mg/dL  --   --  0.39   ALK PHOS U/L  --   --  95   ALT U/L  --   --  13   AST U/L  --   --  13   GLUCOSE RANDOM mg/dL 155*   < > 375*    < > = values in this interval not displayed.         Results from last 7 days   Lab Units 09/05/24  0739 09/04/24  2047 09/04/24  1542 09/04/24  1219 09/04/24  0728 09/03/24  2055 09/03/24  1547 09/03/24  1451 09/03/24  1022 09/03/24  0713 09/02/24  2107 09/02/24  1605   POC GLUCOSE mg/dl 136 165* 206* 185* 222* 312* 130 157* 296* 229* 330* 212*     Results from last 7 days   Lab Units 09/01/24  0756   HEMOGLOBIN A1C % 9.8*     Results from last 7 days   Lab Units 09/02/24  0545 09/01/24  0547   LACTIC ACID mmol/L  --  1.7   PROCALCITONIN ng/ml 0.24 0.09           * I Have Reviewed All Lab Data Listed Above.  * Additional Pertinent Lab Tests Reviewed: All Labs Within Last 24 Hours Reviewed    Imaging:    Imaging Reports Reviewed Today Include: na  Imaging Personally Reviewed by Myself Includes:  na    Recent Cultures (last 7 days):     Results from last 7 days   Lab Units 09/01/24  0557 09/01/24  0547   BLOOD CULTURE  No Growth at 72 hrs. No Growth at 72 hrs.       Last 24 Hours Medication List:   Current Facility-Administered Medications   Medication Dose Route Frequency Provider Last Rate    amLODIPine  10 mg Oral Daily CHERIE Bates      aspirin  81 mg Oral Daily CHERIE Bates      atorvastatin  40 mg Oral Daily CHERIE Bates      cefTRIAXone  1,000 mg Intravenous Q24H CHERIE Bates 1,000 mg (09/05/24 9250)    furosemide  40 mg Oral Daily CHERIE Bates      heparin (porcine)  5,000 Units Subcutaneous Q8H CHRISTIANO Jackson  CHERIE Serrato      insulin glargine  15 Units Subcutaneous HS Sergo Christine MD      insulin lispro  1-6 Units Subcutaneous HS CHERIE Bates      insulin lispro  2-12 Units Subcutaneous TID AC CHERIE Bates      lisinopril  5 mg Oral Daily Charles Persaud PA-C      metoprolol succinate  50 mg Oral Daily CHERIE Bates      ondansetron  4 mg Intravenous Q6H PRN CHERIE Bates      pantoprazole  40 mg Oral Early Morning CHERIE Bates      vancomycin  1,250 mg Intravenous Q12H Landon Dasilva MD Stopped (09/05/24 6695)        Today, Patient Was Seen By: Landon Dasilva MD    ** Please Note: Dictation voice to text software may have been used in the creation of this document. **

## 2024-09-05 NOTE — PROGRESS NOTES
Zain Gayle is a 59 y.o. male who is currently ordered Vancomycin IV with management by the Pharmacy Consult service.  Relevant clinical data and objective / subjective history reviewed.  Vancomycin Assessment:  Indication and Goal AUC/Trough:  Bone/joint infection (goal -600, trough >10), Soft tissue (goal -600, trough >10)   Clinical Status: stable  Micro:     Renal Function:  SCr: 0.83 mg/dL  CrCl: 118.7 mL/min  Renal replacement: Not on dialysis  Days of Therapy: 5  Current Dose: 1250mg IV q12h  Vancomycin Plan:  New Dosing: continue 1250mg IV q12h  Estimated AUC: 485 mcg*hr/mL  Estimated Trough: 13.6 mcg/mL  Next Level: 9/7/24 AM level   Renal Function Monitoring: Daily BMP and UOP  Pharmacy will continue to follow closely for s/sx of nephrotoxicity, infusion reactions and appropriateness of therapy.  BMP and CBC will be ordered per protocol. We will continue to follow the patient’s culture results and clinical progress daily.    Eleanor Tamez, Pharmacist

## 2024-09-05 NOTE — ASSESSMENT & PLAN NOTE
Wt Readings from Last 3 Encounters:   09/05/24 101 kg (222 lb 7.1 oz)   10/05/21 104 kg (229 lb)     Presented with acute onset shortness of breath  CT negative for PE with overall appearance of volume overload  Clinically, he appears fairly euvolemic  Resolved with IV diuretics  Since been transitioned to oral Lasix 40 daily  Echo revealed normal EF

## 2024-09-05 NOTE — PLAN OF CARE
Problem: INFECTION - ADULT  Goal: Absence or prevention of progression during hospitalization  Description: INTERVENTIONS:  - Assess and monitor for signs and symptoms of infection  - Monitor lab/diagnostic results  - Monitor all insertion sites, i.e. indwelling lines, tubes, and drains  - Monitor endotracheal if appropriate and nasal secretions for changes in amount and color  - [place] appropriate cooling/warming therapies per order  - Administer medications as ordered  - Instruct and encourage patient and family to use good hand hygiene technique  - Identify and instruct in appropriate isolation precautions for identified infection/condition  Outcome: Progressing  Goal: Absence of fever/infection during neutropenic period  Description: INTERVENTIONS:  - Monitor WBC    Outcome: Progressing     Problem: SAFETY ADULT  Goal: Patient will remain free of falls  Description: INTERVENTIONS:  - Educate patient/family on patient safety including physical limitations  - Instruct patient to call for assistance with activity   - Consult OT/PT to assist with strengthening/mobility   - Keep Call bell within reach  - Keep bed low and locked with side rails adjusted as appropriate  - Keep care items and personal belongings within reach  - Initiate and maintain comfort rounds  - Make Fall Risk Sign visible to staff  - Offer Toileting every 2 Hours, in advance of need  - Initiate/Maintain bed alarm  - Obtain necessary fall risk management equipment:   - Apply yellow socks and bracelet for high fall risk patients  - Consider moving patient to room near nurses station  Outcome: Progressing  Goal: Maintain or return to baseline ADL function  Description: INTERVENTIONS:  -  Assess patient's ability to carry out ADLs; assess patient's baseline for ADL function and identify physical deficits which impact ability to perform ADLs (bathing, care of mouth/teeth, toileting, grooming, dressing, etc.)  - Assess/evaluate cause of self-care  deficits   - Assess range of motion  - Assess patient's mobility; develop plan if impaired  - Assess patient's need for assistive devices and provide as appropriate  - Encourage maximum independence but intervene and supervise when necessary  - Involve family in performance of ADLs  - Assess for home care needs following discharge   - Consider OT consult to assist with ADL evaluation and planning for discharge  - Provide patient education as appropriate  Outcome: Progressing  Goal: Maintains/Returns to pre admission functional level  Description: INTERVENTIONS:  - Perform AM-PAC 6 Click Basic Mobility/ Daily Activity assessment daily.  - Set and communicate daily mobility goal to care team and patient/family/caregiver.   - Collaborate with rehabilitation services on mobility goals if consulted  - Perform Range of Motion 3 times a day.  - Reposition patient every 2 hours.  - Dangle patient 3 times a day  - Stand patient 3 times a day  - Ambulate patient 3 times a day  - Out of bed to chair 3 times a day   - Out of bed for meals 3 times a day  - Out of bed for toileting  - Record patient progress and toleration of activity level   Outcome: Progressing

## 2024-09-05 NOTE — CASE MANAGEMENT
Case Management Discharge Planning Note    Patient name Zain Gayle  Location ICU 12/ICU 12 MRN [id number]  : 1965 Date 2024       Current Admission Date: 2024  Current Admission Diagnosis:Acute respiratory failure with hypoxia (HCC)   Patient Active Problem List    Diagnosis Date Noted Date Diagnosed    Diabetic foot infection  (HCC) 2024     Acute exacerbation of CHF (congestive heart failure) (HCC) 10/15/2023     Acute respiratory failure with hypoxia (HCC) 10/15/2023     ODALYS (acute kidney injury) (HCC) 10/15/2023     Aortic valve stenosis 10/15/2023     CAD (coronary artery disease) 10/15/2023     Diabetic neuropathy associated with type 2 diabetes mellitus (HCC) 10/15/2023     Essential hypertension 10/15/2023     Gastroesophageal reflux disease without esophagitis 10/15/2023     Type 2 diabetes mellitus with hyperglycemia (HCC) 10/15/2023     Elevated troponin 01/15/2020       LOS (days): 4  Geometric Mean LOS (GMLOS) (days): 3.9  Days to GMLOS:-0.6     OBJECTIVE:  Risk of Unplanned Readmission Score: 10.78         Current admission status: Inpatient   Preferred Pharmacy:   [id number] [address]  [facility] [address]  Phone: [phone] Fax: [phone]    Primary Care Provider: No primary care provider on file.    Primary Insurance: [facility] AND [facility]  Secondary Insurance:     DISCHARGE DETAILS:                                          Other Referral/Resources/Interventions Provided:  Referral Comments: Pt for OR tomorrow for surgical resection of 3 toes.  Pt continues on IV abx.  Pt seen PT/OT; no needs identified by therapies.  Pt may have evaluation post surgery. CM will continue to follow.

## 2024-09-06 ENCOUNTER — APPOINTMENT (INPATIENT)
Dept: RADIOLOGY | Facility: HOSPITAL | Age: 59
DRG: 710 | End: 2024-09-06
Payer: COMMERCIAL

## 2024-09-06 ENCOUNTER — ANESTHESIA EVENT (INPATIENT)
Dept: PERIOP | Facility: HOSPITAL | Age: 59
DRG: 710 | End: 2024-09-06
Payer: COMMERCIAL

## 2024-09-06 ENCOUNTER — ANESTHESIA (INPATIENT)
Dept: PERIOP | Facility: HOSPITAL | Age: 59
DRG: 710 | End: 2024-09-06
Payer: COMMERCIAL

## 2024-09-06 LAB
ANION GAP SERPL CALCULATED.3IONS-SCNC: 6 MMOL/L (ref 4–13)
BACTERIA BLD CULT: NORMAL
BACTERIA BLD CULT: NORMAL
BASOPHILS # BLD MANUAL: 0.21 THOUSAND/UL (ref 0–0.1)
BASOPHILS NFR MAR MANUAL: 2 % (ref 0–1)
BUN SERPL-MCNC: 24 MG/DL (ref 5–25)
CALCIUM SERPL-MCNC: 8.6 MG/DL (ref 8.4–10.2)
CHLORIDE SERPL-SCNC: 103 MMOL/L (ref 96–108)
CO2 SERPL-SCNC: 27 MMOL/L (ref 21–32)
CREAT SERPL-MCNC: 1.06 MG/DL (ref 0.6–1.3)
EOSINOPHIL # BLD MANUAL: 0.63 THOUSAND/UL (ref 0–0.4)
EOSINOPHIL NFR BLD MANUAL: 6 % (ref 0–6)
ERYTHROCYTE [DISTWIDTH] IN BLOOD BY AUTOMATED COUNT: 13.8 % (ref 11.6–15.1)
GFR SERPL CREATININE-BSD FRML MDRD: 77 ML/MIN/1.73SQ M
GLUCOSE SERPL-MCNC: 167 MG/DL (ref 65–140)
GLUCOSE SERPL-MCNC: 184 MG/DL (ref 65–140)
GLUCOSE SERPL-MCNC: 187 MG/DL (ref 65–140)
GLUCOSE SERPL-MCNC: 207 MG/DL (ref 65–140)
GLUCOSE SERPL-MCNC: 210 MG/DL (ref 65–140)
GLUCOSE SERPL-MCNC: 225 MG/DL (ref 65–140)
HCT VFR BLD AUTO: 40.7 % (ref 36.5–49.3)
HGB BLD-MCNC: 12.5 G/DL (ref 12–17)
LYMPHOCYTES # BLD AUTO: 2.94 THOUSAND/UL (ref 0.6–4.47)
LYMPHOCYTES # BLD AUTO: 28 % (ref 14–44)
MCH RBC QN AUTO: 26.5 PG (ref 26.8–34.3)
MCHC RBC AUTO-ENTMCNC: 30.7 G/DL (ref 31.4–37.4)
MCV RBC AUTO: 86 FL (ref 82–98)
MONOCYTES # BLD AUTO: 0.42 THOUSAND/UL (ref 0–1.22)
MONOCYTES NFR BLD: 4 % (ref 4–12)
NEUTROPHILS # BLD MANUAL: 6.3 THOUSAND/UL (ref 1.85–7.62)
NEUTS BAND NFR BLD MANUAL: 5 % (ref 0–8)
NEUTS SEG NFR BLD AUTO: 55 % (ref 43–75)
PLATELET # BLD AUTO: 295 THOUSANDS/UL (ref 149–390)
PLATELET BLD QL SMEAR: ADEQUATE
PMV BLD AUTO: 10.8 FL (ref 8.9–12.7)
POTASSIUM SERPL-SCNC: 4 MMOL/L (ref 3.5–5.3)
RBC # BLD AUTO: 4.72 MILLION/UL (ref 3.88–5.62)
SODIUM SERPL-SCNC: 136 MMOL/L (ref 135–147)
WBC # BLD AUTO: 10.5 THOUSAND/UL (ref 4.31–10.16)

## 2024-09-06 PROCEDURE — 0JBQ0ZZ EXCISION OF RIGHT FOOT SUBCUTANEOUS TISSUE AND FASCIA, OPEN APPROACH: ICD-10-PCS | Performed by: STUDENT IN AN ORGANIZED HEALTH CARE EDUCATION/TRAINING PROGRAM

## 2024-09-06 PROCEDURE — 73630 X-RAY EXAM OF FOOT: CPT

## 2024-09-06 PROCEDURE — 99232 SBSQ HOSP IP/OBS MODERATE 35: CPT | Performed by: INTERNAL MEDICINE

## 2024-09-06 PROCEDURE — 0Y6V0Z0 DETACHMENT AT RIGHT 4TH TOE, COMPLETE, OPEN APPROACH: ICD-10-PCS | Performed by: STUDENT IN AN ORGANIZED HEALTH CARE EDUCATION/TRAINING PROGRAM

## 2024-09-06 PROCEDURE — 85007 BL SMEAR W/DIFF WBC COUNT: CPT | Performed by: INTERNAL MEDICINE

## 2024-09-06 PROCEDURE — 88311 DECALCIFY TISSUE: CPT | Performed by: PATHOLOGY

## 2024-09-06 PROCEDURE — 0QBN0ZZ EXCISION OF RIGHT METATARSAL, OPEN APPROACH: ICD-10-PCS | Performed by: STUDENT IN AN ORGANIZED HEALTH CARE EDUCATION/TRAINING PROGRAM

## 2024-09-06 PROCEDURE — 0Y6Q0Z0 DETACHMENT AT LEFT 1ST TOE, COMPLETE, OPEN APPROACH: ICD-10-PCS | Performed by: STUDENT IN AN ORGANIZED HEALTH CARE EDUCATION/TRAINING PROGRAM

## 2024-09-06 PROCEDURE — 85027 COMPLETE CBC AUTOMATED: CPT | Performed by: INTERNAL MEDICINE

## 2024-09-06 PROCEDURE — 82948 REAGENT STRIP/BLOOD GLUCOSE: CPT

## 2024-09-06 PROCEDURE — 80048 BASIC METABOLIC PNL TOTAL CA: CPT | Performed by: INTERNAL MEDICINE

## 2024-09-06 PROCEDURE — 88305 TISSUE EXAM BY PATHOLOGIST: CPT | Performed by: PATHOLOGY

## 2024-09-06 PROCEDURE — 0Y6X0Z0 DETACHMENT AT RIGHT 5TH TOE, COMPLETE, OPEN APPROACH: ICD-10-PCS | Performed by: STUDENT IN AN ORGANIZED HEALTH CARE EDUCATION/TRAINING PROGRAM

## 2024-09-06 PROCEDURE — 0Y6T0Z0 DETACHMENT AT RIGHT 3RD TOE, COMPLETE, OPEN APPROACH: ICD-10-PCS | Performed by: STUDENT IN AN ORGANIZED HEALTH CARE EDUCATION/TRAINING PROGRAM

## 2024-09-06 RX ORDER — LIDOCAINE HYDROCHLORIDE 10 MG/ML
INJECTION, SOLUTION EPIDURAL; INFILTRATION; INTRACAUDAL; PERINEURAL AS NEEDED
Status: DISCONTINUED | OUTPATIENT
Start: 2024-09-06 | End: 2024-09-06 | Stop reason: HOSPADM

## 2024-09-06 RX ORDER — SODIUM CHLORIDE, SODIUM LACTATE, POTASSIUM CHLORIDE, CALCIUM CHLORIDE 600; 310; 30; 20 MG/100ML; MG/100ML; MG/100ML; MG/100ML
INJECTION, SOLUTION INTRAVENOUS CONTINUOUS PRN
Status: DISCONTINUED | OUTPATIENT
Start: 2024-09-06 | End: 2024-09-06

## 2024-09-06 RX ORDER — EPHEDRINE SULFATE 50 MG/ML
INJECTION INTRAVENOUS AS NEEDED
Status: DISCONTINUED | OUTPATIENT
Start: 2024-09-06 | End: 2024-09-06

## 2024-09-06 RX ORDER — GLYCOPYRROLATE 0.2 MG/ML
INJECTION INTRAMUSCULAR; INTRAVENOUS AS NEEDED
Status: DISCONTINUED | OUTPATIENT
Start: 2024-09-06 | End: 2024-09-06

## 2024-09-06 RX ORDER — PROPOFOL 10 MG/ML
INJECTION, EMULSION INTRAVENOUS AS NEEDED
Status: DISCONTINUED | OUTPATIENT
Start: 2024-09-06 | End: 2024-09-06

## 2024-09-06 RX ORDER — MIDAZOLAM HYDROCHLORIDE 2 MG/2ML
INJECTION, SOLUTION INTRAMUSCULAR; INTRAVENOUS AS NEEDED
Status: DISCONTINUED | OUTPATIENT
Start: 2024-09-06 | End: 2024-09-06

## 2024-09-06 RX ORDER — FENTANYL CITRATE 50 UG/ML
INJECTION, SOLUTION INTRAMUSCULAR; INTRAVENOUS AS NEEDED
Status: DISCONTINUED | OUTPATIENT
Start: 2024-09-06 | End: 2024-09-06

## 2024-09-06 RX ORDER — VANCOMYCIN HYDROCHLORIDE 1 G/200ML
1000 INJECTION, SOLUTION INTRAVENOUS EVERY 12 HOURS
Status: DISCONTINUED | OUTPATIENT
Start: 2024-09-06 | End: 2024-09-07

## 2024-09-06 RX ORDER — DIPHENHYDRAMINE HYDROCHLORIDE 50 MG/ML
12.5 INJECTION, SOLUTION INTRAMUSCULAR; INTRAVENOUS ONCE AS NEEDED
Status: DISCONTINUED | OUTPATIENT
Start: 2024-09-06 | End: 2024-09-06 | Stop reason: HOSPADM

## 2024-09-06 RX ORDER — FENTANYL CITRATE/PF 50 MCG/ML
50 SYRINGE (ML) INJECTION
Status: DISCONTINUED | OUTPATIENT
Start: 2024-09-06 | End: 2024-09-06 | Stop reason: HOSPADM

## 2024-09-06 RX ORDER — HYDROMORPHONE HCL/PF 1 MG/ML
0.5 SYRINGE (ML) INJECTION
Status: DISCONTINUED | OUTPATIENT
Start: 2024-09-06 | End: 2024-09-06 | Stop reason: HOSPADM

## 2024-09-06 RX ORDER — OXYCODONE AND ACETAMINOPHEN 5; 325 MG/1; MG/1
1 TABLET ORAL ONCE AS NEEDED
Status: DISCONTINUED | OUTPATIENT
Start: 2024-09-06 | End: 2024-09-06 | Stop reason: HOSPADM

## 2024-09-06 RX ORDER — ONDANSETRON 2 MG/ML
4 INJECTION INTRAMUSCULAR; INTRAVENOUS ONCE AS NEEDED
Status: DISCONTINUED | OUTPATIENT
Start: 2024-09-06 | End: 2024-09-06 | Stop reason: HOSPADM

## 2024-09-06 RX ORDER — SODIUM CHLORIDE, SODIUM LACTATE, POTASSIUM CHLORIDE, CALCIUM CHLORIDE 600; 310; 30; 20 MG/100ML; MG/100ML; MG/100ML; MG/100ML
20 INJECTION, SOLUTION INTRAVENOUS CONTINUOUS
Status: DISCONTINUED | OUTPATIENT
Start: 2024-09-06 | End: 2024-09-09

## 2024-09-06 RX ORDER — PHENYLEPHRINE HCL IN 0.9% NACL 1 MG/10 ML
SYRINGE (ML) INTRAVENOUS AS NEEDED
Status: DISCONTINUED | OUTPATIENT
Start: 2024-09-06 | End: 2024-09-06

## 2024-09-06 RX ORDER — PROPOFOL 10 MG/ML
INJECTION, EMULSION INTRAVENOUS CONTINUOUS PRN
Status: DISCONTINUED | OUTPATIENT
Start: 2024-09-06 | End: 2024-09-06

## 2024-09-06 RX ORDER — LIDOCAINE HYDROCHLORIDE 10 MG/ML
INJECTION, SOLUTION EPIDURAL; INFILTRATION; INTRACAUDAL; PERINEURAL AS NEEDED
Status: DISCONTINUED | OUTPATIENT
Start: 2024-09-06 | End: 2024-09-06

## 2024-09-06 RX ADMIN — Medication 100 MCG: at 08:36

## 2024-09-06 RX ADMIN — CEFTRIAXONE SODIUM 1000 MG: 10 INJECTION, POWDER, FOR SOLUTION INTRAVENOUS at 05:04

## 2024-09-06 RX ADMIN — SODIUM CHLORIDE, SODIUM LACTATE, POTASSIUM CHLORIDE, AND CALCIUM CHLORIDE 20 ML/HR: .6; .31; .03; .02 INJECTION, SOLUTION INTRAVENOUS at 11:58

## 2024-09-06 RX ADMIN — SODIUM CHLORIDE, SODIUM LACTATE, POTASSIUM CHLORIDE, AND CALCIUM CHLORIDE: .6; .31; .03; .02 INJECTION, SOLUTION INTRAVENOUS at 07:55

## 2024-09-06 RX ADMIN — HEPARIN SODIUM 5000 UNITS: 5000 INJECTION INTRAVENOUS; SUBCUTANEOUS at 22:42

## 2024-09-06 RX ADMIN — HEPARIN SODIUM 5000 UNITS: 5000 INJECTION INTRAVENOUS; SUBCUTANEOUS at 13:17

## 2024-09-06 RX ADMIN — FENTANYL CITRATE 25 MCG: 50 INJECTION, SOLUTION INTRAMUSCULAR; INTRAVENOUS at 09:16

## 2024-09-06 RX ADMIN — LIDOCAINE HYDROCHLORIDE 50 MG: 10 INJECTION, SOLUTION EPIDURAL; INFILTRATION; INTRACAUDAL; PERINEURAL at 07:59

## 2024-09-06 RX ADMIN — Medication 100 MCG: at 08:17

## 2024-09-06 RX ADMIN — Medication 100 MCG: at 09:02

## 2024-09-06 RX ADMIN — EPHEDRINE SULFATE 2.5 MG: 50 INJECTION, SOLUTION INTRAVENOUS at 08:53

## 2024-09-06 RX ADMIN — Medication 50 MCG: at 08:10

## 2024-09-06 RX ADMIN — PROPOFOL 50 MCG/KG/MIN: 10 INJECTION, EMULSION INTRAVENOUS at 08:00

## 2024-09-06 RX ADMIN — PROPOFOL 50 MG: 10 INJECTION, EMULSION INTRAVENOUS at 08:00

## 2024-09-06 RX ADMIN — INSULIN LISPRO 1 UNITS: 100 INJECTION, SOLUTION INTRAVENOUS; SUBCUTANEOUS at 22:43

## 2024-09-06 RX ADMIN — Medication 100 MCG: at 08:28

## 2024-09-06 RX ADMIN — VANCOMYCIN HYDROCHLORIDE 1000 MG: 1 INJECTION, SOLUTION INTRAVENOUS at 19:08

## 2024-09-06 RX ADMIN — EPHEDRINE SULFATE 2.5 MG: 50 INJECTION, SOLUTION INTRAVENOUS at 08:46

## 2024-09-06 RX ADMIN — PANTOPRAZOLE SODIUM 40 MG: 40 TABLET, DELAYED RELEASE ORAL at 05:04

## 2024-09-06 RX ADMIN — Medication 100 MCG: at 08:46

## 2024-09-06 RX ADMIN — GLYCOPYRROLATE 0.2 MG: 0.2 INJECTION, SOLUTION INTRAMUSCULAR; INTRAVENOUS at 08:09

## 2024-09-06 RX ADMIN — VANCOMYCIN HYDROCHLORIDE 1250 MG: 5 INJECTION, POWDER, LYOPHILIZED, FOR SOLUTION INTRAVENOUS at 03:33

## 2024-09-06 RX ADMIN — FENTANYL CITRATE 25 MCG: 50 INJECTION, SOLUTION INTRAMUSCULAR; INTRAVENOUS at 09:14

## 2024-09-06 RX ADMIN — MIDAZOLAM HYDROCHLORIDE 2 MG: 1 INJECTION, SOLUTION INTRAMUSCULAR; INTRAVENOUS at 07:51

## 2024-09-06 RX ADMIN — GLYCOPYRROLATE 0.1 MG: 0.2 INJECTION, SOLUTION INTRAMUSCULAR; INTRAVENOUS at 08:17

## 2024-09-06 RX ADMIN — Medication 100 MCG: at 08:53

## 2024-09-06 RX ADMIN — GLYCOPYRROLATE 0.1 MG: 0.2 INJECTION, SOLUTION INTRAMUSCULAR; INTRAVENOUS at 08:10

## 2024-09-06 RX ADMIN — INSULIN LISPRO 2 UNITS: 100 INJECTION, SOLUTION INTRAVENOUS; SUBCUTANEOUS at 17:25

## 2024-09-06 RX ADMIN — INSULIN LISPRO 2 UNITS: 100 INJECTION, SOLUTION INTRAVENOUS; SUBCUTANEOUS at 12:30

## 2024-09-06 RX ADMIN — HEPARIN SODIUM 5000 UNITS: 5000 INJECTION INTRAVENOUS; SUBCUTANEOUS at 05:04

## 2024-09-06 RX ADMIN — INSULIN GLARGINE 15 UNITS: 100 INJECTION, SOLUTION SUBCUTANEOUS at 22:43

## 2024-09-06 RX ADMIN — FENTANYL CITRATE 50 MCG: 50 INJECTION, SOLUTION INTRAMUSCULAR; INTRAVENOUS at 07:59

## 2024-09-06 NOTE — OP NOTE
OPERATIVE REPORT  PATIENT NAME: Zain Gayle    :  1965  MRN: [id number]  Pt Location: [facility] OR ROOM 02    SURGERY DATE: 2024    Surgeons and Role:     * Robb Kim DPM - Primary    Preop Diagnosis:  Acute osteomyelitis of right foot (HCC) [M86.171]  Acute osteomyelitis of left foot [M86.172]  Chronic right foot wound [L97.512]    Post op Diagnosis:  Same    Procedure(s):  (1) Right foot 3rd metatarsal resection (Incision of bone cortex for osteomyelitis)  (2) Right foot 4th digit amputation at MTPJ  (3) Right foot 3rd digit amputation at MTPJ  (4) Left foot hallux amputation at MTPJ  (5) Right foot wound debridement through subcutaneous level    Specimen(s):  ID Type Source Tests Collected by Time Destination   1 : LEFT HALLUX Tissue Toe, Left TISSUE EXAM Robb Kim DPM 2024 0826    2 : RIGHT 5TH TOE Tissue Toe, Right TISSUE EXAM Robb Kim DPM 2024 0838    3 : RIGHT 4TH TOE Tissue Toe, Right TISSUE EXAM Robb Kim DPM 2024 0843    4 : RIGHT 3RD METATARSAL Tissue Bone TISSUE EXAM Robb Kim DPM 2024 0853        Estimated Blood Loss:   Minimal    Hemostasis:   PAT on left foot set to 250mmHg for 14 min  PAT on right foot set to 250mmHg for 33 min    Anesthesia Type:   IV Sedation with Anesthesia    Operative Indications:  Osteomyelitis of LEFT hallux bone, right 3rd metatarsal bone, and right 4th proximal phalanx bone    Operative Findings:  No purulent drainage expressed. Right 3rd metatarsal head bone was necrotic.      Complications:   None    Procedure and Technique:  Patient was brought back to the operating room and left on his stretcher.  Preoperative indications for today's procedures included MRI findings of osteomyelitis of the right third metatarsal head, right fourth proximal phalanx base and left hallux bones.  The rationale for the right fifth digit amputation was for ambulation purposes and to reduce possible wound development of this toe and return to  operating room.  Patient was in agreement of today's procedures.     After adequate IV sedation the bilateral feet were prepped and draped in the usual aseptic fashion.  A local infiltrative block was administered to the patient's left foot and the pneumatic ankle tourniquet was inflated at this time.  A racquet shaped incision was made encompassing the left hallux at the first metatarsophalangeal joint.  The left hallux was carefully disarticulated and removed from the patient's left foot.  The left hallux was sent to pathology for further analysis.  The incision site was then closely reapproximated using 3-0 nylon.  The pneumatic ankle tourniquet was deflated on the left ankle at this time at 14 minutes.     Next, attention was directed to the right third metatarsal.  The pneumatic ankle tourniquet was inflated at this time.  This was confirmed osteomyelitis on MRI.  Using the 10 surgical blade a linear incision was made down to bone.  Careful reflection of soft tissue revealed the necrotic right third metatarsal head.  Next, a sagittal saw was used to make an osteotomy of the right third metatarsal head at the surgical neck.  This bone was sent to pathology for further analysis.     Next, attention was directed to the right fourth digit which was confirmed osteomyelitis on MRI as well.  A racquet shaped incision was made at this toe at the level of the fourth metatarsophalangeal joint.  The right fourth digit was then carefully dissected from this joint and removed from the operative field.  The right fourth digit was sent to pathology for further analysis.     Next, attention was directed to the right fifth digit.  A racquet shaped incision was made encompassing this toe at the fifth metatarsophalangeal joint.  The right fifth digit was then carefully dissected and removed from the patient's right foot.  This toe was also sent to pathology.     All 3 surgical sites on the right foot were then copiously irrigated  with normal saline solution through bulb syringe.  The surgical sites were closely reapproximated using 3-0 nylon.  There was a plantar right plantar submet 3 ulceration site that was left open due to lack of skin closure.  The site was carefully excisionally debrided through the subcutaneous level using a 10 surgical blade.  The preoperative and postoperative measurements of this right foot submet 3 ulcer were 3.5 x 3.5 x 1.0 cm.  This wound was carefully packed with iodoform packing as well.  The pneumatic ankle tourniquet was then deflated at this time at 33 minutes.     Bilateral feet were then dressed with Adaptic, 4 x 4 gauze, ABD padding and a Kerlix wrap.  Patient was discharged to the PACU where he will receive bilateral postoperative x-rays.  Eventually discharged back to the floor will be will remain nonweightbearing for the next few days.  Patient tolerated procedure well.      I was present for the entire procedure.    Patient Disposition:  PACU              SIGNATURE: Robb Kim DPM  DATE: September 6, 2024  TIME: 9:24 AM

## 2024-09-06 NOTE — UTILIZATION REVIEW
Continued Stay Review    Date: 9/6                          Current Patient Class: Inpatient  Current Level of Care: MEd/surg    HPI:59 y.o. male initially admitted on 9/1     Assessment/Plan: S/P amputation of toes. Contineuw tin IV abx.  Acute CHF exacerbation resolved with IV lasix. PT/OT eval for discharged planning.     OPERATIVE NOTE:  SURGERY DATE: 9/6/2024   Anesthesia Type:   IV Sedation with Anesthesia     Procedure(s):  (1) Right foot 3rd metatarsal resection (Incision of bone cortex for osteomyelitis)  (2) Right foot 4th digit amputation at [facility]  (3) Right foot 3rd digit amputation at [facility]  (4) Left foot hallux amputation at [facility]  (5) Right foot wound debridement through subcutaneous level  Operative Findings:  No purulent drainage expressed. Right 3rd metatarsal head bone was necrotic.     Vital Signs (last 3 days)       Date/Time Temp Pulse Resp BP MAP (mmHg) SpO2 O2 Device Cardiac (WDL) Patient Position - Orthostatic VS Vivian Coma Scale Score Pain    09/06/24 1025 98.2 °F (36.8 °C) 58 16 128/69 93 -- -- -- Lying -- --    09/06/24 1000 -- 54 18 125/74 94 95 % -- WDL -- 15 1    09/06/24 0956 -- -- -- -- -- -- -- -- -- -- 1    09/06/24 0945 -- 57 20 126/78 98 98 % -- WDL -- 15 No Pain    09/06/24 0930 97.9 °F (36.6 °C) 64 20 118/76 93 96 % None (Room air) WDL -- 15 No Pain    09/06/24 0800 -- -- -- -- -- -- -- -- -- 15 No Pain    09/06/24 0714 97.8 °F (36.6 °C) 64 20 162/81 -- 99 % None (Room air) -- -- -- No Pain    09/06/24 0611 98 °F (36.7 °C) 63 16 107/53 76 92 % None (Room air) -- Lying -- No Pain    09/05/24 2300 98.3 °F (36.8 °C) 57 19 87/49 62 96 % None (Room air) -- Lying -- No Pain    09/05/24 2015 -- -- -- -- -- -- -- -- -- 15 --    09/05/24 1900 99.5 °F (37.5 °C) 59 18 126/61 87 99 % -- -- -- -- --    09/05/24 1500 98.3 °F (36.8 °C) 57 -- 123/69 90 99 % -- -- -- -- --    09/05/24 1059 99.1 °F (37.3 °C) 50 16 130/65 90 96 % -- -- Lying -- --    09/05/24 0800 -- -- -- -- -- -- -- -- -- 15 No  Pain    09/05/24 0700 98.2 °F (36.8 °C) 46 16 135/65 94 98 % -- -- Lying -- --    09/05/24 0300 -- 44 18 120/58 81 95 % None (Room air) -- Lying -- --    09/04/24 2000 -- -- -- -- -- -- -- -- -- 15 No Pain    09/04/24 1941 98 °F (36.7 °C) 57 21 128/69 93 98 % None (Room air) -- Sitting -- No Pain    09/04/24 1457 98 °F (36.7 °C) 58 -- 113/63 81 97 % None (Room air) -- Sitting -- --    09/04/24 0815 97.8 °F (36.6 °C) 65 -- 142/70 100 -- -- -- -- -- --    09/04/24 0800 -- -- -- -- -- -- -- -- -- 15 No Pain    09/03/24 2000 -- -- -- -- -- -- -- -- -- 15 No Pain    09/03/24 1845 97.7 °F (36.5 °C) 65 22 135/65 93 97 % None (Room air) -- Lying -- No Pain    09/03/24 1550 97.8 °F (36.6 °C) 57 18 123/64 87 99 % None (Room air) -- Lying -- No Pain    09/03/24 0800 97.7 °F (36.5 °C) -- -- -- -- -- -- -- -- 15 No Pain    09/03/24 0700 97.6 °F (36.4 °C) 54 18 125/65 89 94 % -- -- Lying -- --    09/03/24 0236 97.7 °F (36.5 °C) 48 19 104/64 80 94 % -- -- Lying -- --          Weight (last 2 days)       Date/Time Weight    09/06/24 0600 101 (222.22)    09/05/24 0600 101 (222.44)    09/04/24 0551 101 (222)    09/04/24 0503 101 (222)              Pertinent Labs/Diagnostic Results:   Radiology:  XR foot 3+ vw right   Final Interpretation by Jose A Duran MD (09/06 1006)   Interval postsurgical changes.         Computerized Assisted Algorithm (CAA) may have been used to analyze all applicable images.         Workstation performed: [id number]         XR foot 3+ vw left   Final Interpretation by Jose A Duran MD (09/06 1006)      Interval amputation of the great toe.         Computerized Assisted Algorithm (CAA) may have been used to analyze all applicable images.         Workstation performed: [id number]         MRI foot/forefoot toes left w wo contrast   Final Interpretation by Dane Floyd MD (09/04 9401)   Plantar skin ulceration great toe with marrow change first distal phalanx consistent with osteomyelitis. Some  marrow change also noted distal aspect of first proximal phalanx again suspicious for osteomyelitis and presumptive intervening intermetatarsal    septic arthropathy. Remaining marrow signal is preserved.      The study was marked in EPIC for immediate notification.         Workstation performed: [id number]         MRI foot/forefoot toes right w wo contrast   Final Interpretation by Dane Floyd MD (09/04 0758)      1. Third submetatarsal skin ulceration with marrow change involving the residual third metatarsal consistent with osteomyelitis as described.   2. T1 replacement signal throughout the fourth proximal phalanx with corresponding increased T2 signal and post gadolinium enhancement, again consistent with osteomyelitis.   3. Postoperative resection of the second and third toes and first transmetatarsal amputation as described.      The study was marked in EPIC for immediate notification.         Workstation performed: [id number]         VAS NALDO and waveform analysis, multiple levels   Final Interpretation by Lacie Nixon MD (09/04 1412)      XR foot 2 vw right   Final Interpretation by Sotero Andrade MD (09/03 1005)         Erosions of the third phalanx concerning for osteomyelitis.      Equivocal erosions of the first and second phalanx could represent osteomyelitis. MRI may help differentiate this possibility.      Soft tissue ulceration adjacent to the third phalanx.      I have personally reviewed this study including all images.  / R.J.F.            Resident: Rachele Matos I, the attending radiologist, have reviewed the images and agree with the final report above.      Workstation performed: [id number]         XR foot 2 vw left   Final Interpretation by Sotero Andrade MD (09/03 1000)      Soft tissue ulceration at the plantar aspect of the first toe without signs of osteomyelitis at this time.      I have personally reviewed this study including all images.  / R.J.F.         Resident:  Rachele Matos I, the attending radiologist, have reviewed the images and agree with the final report above.      Workstation performed: [id number]         CTA ED chest PE Study   Final Interpretation by Justyn Chawla DO (09/01 0507)   Diffuse bilateral multifocal groundglass opacities throughout the lungs most consistent with multifocal infectious process. Early ARDS can have a similar appearance. Clinical correlation is advised   No central or segmental pulmonary embolus.                  Workstation performed: [id number]         XR chest 1 view portable   ED Interpretation by Nia Moreno PA-C (09/01 0406)   Mild pulmonary edema      Final Interpretation by Cedric Rivers MD (09/02 1924)      Mild pulmonary edema, new since the prior examination.            Workstation performed: [id number]           Cardiology:  Echo follow up/limited w/ contrast if indicated   Final Result by Mari Watts MD (09/03 1125)        Left Ventricle: Left ventricular cavity size is normal. The left    ventricular ejection fraction is 55%. Systolic function is normal. Wall    motion is normal.     This is a limited study to assess ejection fraction and wall motion    abnormality.         ECG 12 lead   Final Result by Tim Whitlock MD (09/02 4734)   Sinus bradycardia   Nonspecific ST and T wave abnormality   Abnormal ECG   When compared with ECG of 01-SEP-2024 03:47,   Vent. rate has decreased BY  67 BPM   ST no longer depressed in Lateral leads   Flat T waves now evident in Anterior leads   Confirmed by Tim Whitlock ([id number]) on 9/2/2024 5:42:24 PM      Echo complete w/ contrast if indicated   Final Result by Tim Whitlock MD (09/01 1636)        Left Ventricle: Left ventricular cavity size is normal. Wall thickness    is increased. There is mild to moderate concentric hypertrophy. LVEF is    likely 50-55% Wall motion cannot be accurately assessed. Diastolic    function is moderately abnormal, consistent with grade II  (pseudonormal)    relaxation.     Left Atrium: The atrium is mildly dilated.     Right Atrium: The atrium is mildly dilated.     Aortic Valve: The leaflets are moderately thickened. The leaflets are    moderately calcified. There is moderately reduced mobility. There is    moderate to severe stenosis. LVOT VTI of 22, AV VTI of 84, DVI of 0.26,    mean gradient 29, max gradient of 53.95, with Valve area of 1.      Patient with borderline severe aortic stenosis         ECG 12 lead   Final Result by Tim Whitlock MD (09/01 1725)   Sinus tachycardia   Nonspecific ST and T wave abnormality   Abnormal ECG   When compared with ECG of 20-AUG-2024 07:43,   Vent. rate has increased BY  52 BPM   ST now depressed in Lateral leads   Inverted T waves have replaced nonspecific T wave abnormality in Lateral    leads   Confirmed by Tim Whitlock ([id number]) on 9/1/2024 5:25:08 PM        Results from last 7 days   Lab Units 09/01/24  0402   SARS-COV-2  Negative     Results from last 7 days   Lab Units 09/06/24  0508 09/05/24 0519 09/04/24  0502 09/03/24  0526 09/02/24  0545   WBC Thousand/uL 10.50* 10.03 9.88 11.13* 16.72*   HEMOGLOBIN g/dL 12.5 12.9 12.7 11.5* 11.5*   HEMATOCRIT % 40.7 40.7 39.8 36.4* 35.3*   PLATELETS Thousands/uL 295 305 296 284 286   TOTAL NEUT ABS Thousands/µL  --  6.02 6.02  --  14.78*   BANDS PCT % 5  --   --   --   --          Results from last 7 days   Lab Units 09/06/24  0508 09/05/24  0519 09/04/24  0502 09/03/24  0526 09/02/24  0545   SODIUM mmol/L 136 136 135 137 132*   POTASSIUM mmol/L 4.0 4.0 4.1 4.2 4.2   CHLORIDE mmol/L 103 102 101 104 100   CO2 mmol/L 27 30 30 27 25   ANION GAP mmol/L 6 4 4 6 7   BUN mg/dL 24 19 25 31* 34*   CREATININE mg/dL 1.06 0.83 0.81 0.91 1.05   EGFR ml/min/1.73sq m 77 97 98 93 78   CALCIUM mg/dL 8.6 8.8 8.9 8.4 8.6   CALCIUM, IONIZED mmol/L  --   --   --   --  1.12   MAGNESIUM mg/dL  --   --   --   --  1.9   PHOSPHORUS mg/dL  --   --   --   --  3.0     Results from last 7  days   Lab Units 09/02/24  0545 09/01/24  0351   AST U/L 13 16   ALT U/L 13 15   ALK PHOS U/L 95 124*   TOTAL PROTEIN g/dL 6.9 8.4   ALBUMIN g/dL 3.1* 3.7   TOTAL BILIRUBIN mg/dL 0.39 0.45     Results from last 7 days   Lab Units 09/06/24  1122 09/06/24  0929 09/06/24  0730 09/05/24  2111 09/05/24  1612 09/05/24  1103 09/05/24  0739 09/04/24  2047 09/04/24  1542 09/04/24  1219 09/04/24  0728 09/03/24  2055   POC GLUCOSE mg/dl 187* 210* 207* 247* 220* 216* 136 165* 206* 185* 222* 312*     Results from last 7 days   Lab Units 09/06/24  0508 09/05/24  0519 09/04/24  0502 09/03/24  0526 09/02/24  0545 09/01/24  0351   GLUCOSE RANDOM mg/dL 225* 155* 207* 247* 375* 391*         Results from last 7 days   Lab Units 09/01/24  0756   HEMOGLOBIN A1C % 9.8*   EAG mg/dl 235           Results from last 7 days   Lab Units 09/01/24  0800 09/01/24  0547 09/01/24  0351   HS TNI 0HR ng/L  --   --  54*   HS TNI 2HR ng/L  --  105*  --    HSTNI D2 ng/L  --  51*  --    HS TNI 4HR ng/L 480*  --   --    HSTNI D4 ng/L 426*  --   --      Results from last 7 days   Lab Units 09/02/24  0545 09/01/24  0547   PROCALCITONIN ng/ml 0.24 0.09     Results from last 7 days   Lab Units 09/01/24  0547   LACTIC ACID mmol/L 1.7     Results from last 7 days   Lab Units 09/01/24  0351   BNP pg/mL 655*     Results from last 7 days   Lab Units 09/01/24  0402   INFLUENZA A PCR  Negative   INFLUENZA B PCR  Negative   RSV PCR  Negative       Results from last 7 days   Lab Units 09/01/24  0557 09/01/24  0547   BLOOD CULTURE  No Growth After 4 Days. No Growth After 4 Days.     Results from last 7 days   Lab Units 09/05/24  0946 09/04/24  0502 09/03/24  0526   VANCOMYCIN RM ug/mL 18.9 12.4 12.1       Medications:   Scheduled Medications:  amLODIPine, 10 mg, Oral, Daily  aspirin, 81 mg, Oral, Daily  atorvastatin, 40 mg, Oral, Daily  cefTRIAXone, 1,000 mg, Intravenous, Q24H  furosemide, 40 mg, Oral, Daily  heparin (porcine), 5,000 Units, Subcutaneous, Q8H  CHRISTIANO  insulin glargine, 15 Units, Subcutaneous, HS  insulin lispro, 1-6 Units, Subcutaneous, HS  insulin lispro, 2-12 Units, Subcutaneous, TID AC  lisinopril, 5 mg, Oral, Daily  metoprolol succinate, 50 mg, Oral, Daily  pantoprazole, 40 mg, Oral, Early Morning  vancomycin, 1,000 mg, Intravenous, Q12H      Continuous IV Infusions:  lactated ringers, 20 mL/hr, Intravenous, Continuous      PRN Meds:  ondansetron, 4 mg, Intravenous, Q6H PRN        Discharge Plan: TBD    Network Utilization Review Department  ATTENTION: Please call with any questions or concerns to [phone] and carefully listen to the prompts so that you are directed to the right person. All voicemails are confidential.   For Discharge needs, contact Care Management DC Support Team at [phone] opt. [phone]  Send all requests for admission clinical reviews, approved or denied determinations and any other requests to dedicated fax number below belonging to the campus where the patient is receiving treatment. List of dedicated fax numbers for the Facilities:  FACILITY NAME UR FAX NUMBER   ADMISSION DENIALS (Administrative/Medical Necessity) [phone]   DISCHARGE SUPPORT TEAM (NETWORK) [phone]   PARENT CHILD HEALTH (Maternity/NICU/Pediatrics) [phone]   [facility] [phone]   [facility] [phone]   Critical access hospital [phone]   [facility] [phone]   Critical access hospital [phone]   [facility] [phone]   [facility] [phone]   [facility] [phone]   [facility] [phone]   [facility] [phone]   [facility] [phone]   [facility] [phone]

## 2024-09-06 NOTE — ASSESSMENT & PLAN NOTE
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 Some marrow change also noted distal aspect of first proximal phalanx again suspicious for osteomyelitis and presumptive intervening intermetatarsal septic arthropathy\"   MRI of right foot \" Third submetatarsal skin ulceration with marrow change involving the residual third metatarsal consistent with osteomyelitis as described.  2. T1 replacement signal throughout the fourth proximal phalanx with corresponding increased T2 signal and post gadolinium enhancement, again consistent with osteomyelitis\"  Underwent OR intervention by podiatry today left foot hallux amputation right foot third and fourth digit amputation  Continue vancomycin  PT/OT  Follow-up further podiatry recommendations  "

## 2024-09-06 NOTE — PROGRESS NOTES
Zain Gayle is a 59 y.o. male who is currently ordered Vancomycin IV with management by the Pharmacy Consult service.  Relevant clinical data and objective / subjective history reviewed.  Vancomycin Assessment:  Indication and Goal AUC/Trough: Bone/joint infection (goal -600, trough >10); Soft tissue (goal -600, trough >10), -600, trough >10  Clinical Status: stable  Micro:     Renal Function:  SCr: 1.06 mg/dL  CrCl: 92.9 mL/min  Renal replacement: Not on dialysis  Days of Therapy: 6  Current Dose: 1250mg IV q12h  Vancomycin Plan:  New Dosinmg IV q12h  Estimated AUC: 493 mcg*hr/mL  Estimated Trough: 14.9 mcg/mL  Next Level: 9/24 AM level  Renal Function Monitoring: Daily BMP and UOP  Pharmacy will continue to follow closely for s/sx of nephrotoxicity, infusion reactions and appropriateness of therapy.  BMP and CBC will be ordered per protocol. We will continue to follow the patient’s culture results and clinical progress daily.    Eleanor Tamez, Pharmacist

## 2024-09-06 NOTE — ASSESSMENT & PLAN NOTE
Wt Readings from Last 3 Encounters:   09/06/24 101 kg (222 lb 3.6 oz)   10/05/21 104 kg (229 lb)     Presented with acute onset shortness of breath  CT negative for PE with overall appearance of volume overload  Clinically, he appears fairly euvolemic  Resolved with IV diuretics  Since been transitioned to oral Lasix 40 daily  Echo revealed normal EF

## 2024-09-06 NOTE — ANESTHESIA PREPROCEDURE EVALUATION
Procedure:  RIGHT FOOT PARTIAL 3RD RAY RESECTION (Right: Foot)    Relevant Problems   CARDIO   (+) Acute exacerbation of CHF (congestive heart failure) (HCC)   (+) Aortic valve stenosis   (+) CAD (coronary artery disease)   (+) Essential hypertension      ENDO   (+) Type 2 diabetes mellitus with hyperglycemia (HCC)      GI/HEPATIC   (+) Gastroesophageal reflux disease without esophagitis      /RENAL   (+) ODALYS (acute kidney injury) (HCC)      NEURO/PSYCH   (+) Diabetic neuropathy associated with type 2 diabetes mellitus (HCC)      PULMONARY   (+) Acute respiratory failure with hypoxia (HCC)      Orthopedic/Musculoskeletal   (+) Diabetic foot infection  (HCC)      Other   (+) Elevated troponin        Left Ventricle: Left ventricular cavity size is normal. Wall thickness is increased. There is mild to moderate concentric hypertrophy. LVEF is likely 50-55% Wall motion cannot be accurately assessed. Diastolic function is moderately abnormal, consistent with grade II (pseudonormal) relaxation.    Left Atrium: The atrium is mildly dilated.    Right Atrium: The atrium is mildly dilated.    Aortic Valve: The leaflets are moderately thickened. The leaflets are moderately calcified. There is moderately reduced mobility. There is moderate to severe stenosis. LVOT VTI of 22, AV VTI of 84, DVI of 0.26, mean gradient 29, max gradient of 53.95, with Valve area of 1.  Physical Exam    Airway    Mallampati score: III  TM Distance: >3 FB  Neck ROM: full     Dental   Comment: Denies loose teeth     Cardiovascular  Cardiovascular exam normal    Pulmonary  Pulmonary exam normal     Other Findings  Portions of exam deferred due to low yield and/or unknown COVID status      Anesthesia Plan  ASA Score- 4     Anesthesia Type- IV sedation with anesthesia with ASA Monitors.         Additional Monitors:     Airway Plan:            Plan Factors-Exercise tolerance (METS): >4 METS.    Chart reviewed.   Existing labs reviewed. Patient summary  reviewed.    Patient is not a current smoker.              Induction- intravenous.    Postoperative Plan-     Perioperative Resuscitation Plan - Level 1 - Full Code.       Informed Consent- Anesthetic plan and risks discussed with patient.  I personally reviewed this patient with the CRNA. Discussed and agreed on the Anesthesia Plan with the CRNA..

## 2024-09-06 NOTE — ANESTHESIA POSTPROCEDURE EVALUATION
Post-Op Assessment Note    CV Status:  Stable  Pain Score: 0    Pain management: adequate       Mental Status:  Alert and awake   Hydration Status:  Euvolemic   PONV Controlled:  Controlled   Airway Patency:  Patent     Post Op Vitals Reviewed: Yes    No anethesia notable event occurred.    Staff: Anesthesiologist               BP   118/78   Temp      Pulse  64   Resp   16   SpO2   95

## 2024-09-06 NOTE — PROGRESS NOTES
"[facility]  Progress Note  Name: Zain Gayle I  MRN: [id number]  Unit/Bed#: ICU 12 I Date of Admission: 9/1/2024   Date of Service: 9/6/2024 I Hospital Day: 5    Assessment & Plan   * Acute respiratory failure with hypoxia (HCC)  Assessment & Plan  Likely secondary to CHF exacerbation   CT negative for PE with overall appearance of volume overload  Required BIPAP for a short period of time   Since resolved now saturating adequately on room air    Diabetic foot infection  (HCC)  Assessment & Plan  Right foot ulcer initially caused by brown recluse spider resulting in osteomyelitis and right 3-digit amputation   Also with left plantar ulcer  High suspicion for osteomyelitis,  MRI of left foot revealed \"Plantar skin ulceration great toe with marrow change first distal phalanx consistent with osteomyelitis. Some marrow change also noted distal aspect of first proximal phalanx again suspicious for osteomyelitis and presumptive intervening intermetatarsal septic arthropathy\"   MRI of right foot \" Third submetatarsal skin ulceration with marrow change involving the residual third metatarsal consistent with osteomyelitis as described.  2. T1 replacement signal throughout the fourth proximal phalanx with corresponding increased T2 signal and post gadolinium enhancement, again consistent with osteomyelitis\"  Underwent OR intervention by podiatry today left foot hallux amputation right foot third and fourth digit amputation  Continue vancomycin  PT/OT  Follow-up further podiatry recommendations    Type 2 diabetes mellitus with hyperglycemia (HCC)  Assessment & Plan  Continue Lantus 15 units nightly  Sliding-scale insulin  Monitor blood glucose    Gastroesophageal reflux disease without esophagitis  Assessment & Plan  Continue PPI    Aortic valve stenosis  Assessment & Plan  Noted to have moderate aortic stenosis with fused valve on echocardiogram in 2023  Patient has been discussing valve replacement " with his outpatient cardiology   Continue outpatient follow-up with cardiology    ODALYS (acute kidney injury) ([facility])  Assessment & Plan  Baseline crt 1.1-1.25  Presenting with crt 1.57  Likely secondary to chf exacerbation   Now back to baseline continue to monitor    Acute exacerbation of CHF (congestive heart failure) (HCC)  Assessment & Plan  Wt Readings from Last 3 Encounters:   24 101 kg (222 lb 3.6 oz)   10/05/21 104 kg (229 lb)     Presented with acute onset shortness of breath  CT negative for PE with overall appearance of volume overload  Clinically, he appears fairly euvolemic  Resolved with IV diuretics  Since been transitioned to oral Lasix 40 daily  Echo revealed normal EF         VTE Pharmacologic Prophylaxis:   Pharmacologic: Heparin  Mechanical VTE Prophylaxis in Place: Yes    Patient Centered Rounds: I have performed bedside rounds with nursing staff today.    Discussions with Specialists or Other Care Team Provider: cm, nursing    Education and Discussions with Family / Patient: pt, declined family update    Time Spent for Care: 30 minutes.  More than 50% of total time spent on counseling and coordination of care as described above.    Current Length of Stay: 5 day(s)    Current Patient Status: Inpatient   Certification Statement: The patient will continue to require additional inpatient hospital stay due to see below    Discharge Plan: Dissipate medically cleared within 24 hours.  Will need PT/OT eval prior to discharge for possible rehab    Code Status: Level 1 - Full Code      Subjective:   Denies chest pain, fevers, chills, cough, abdominal pain    Objective:     Vitals:   Temp (24hrs), Av.4 °F (36.9 °C), Min:97.8 °F (36.6 °C), Max:99.5 °F (37.5 °C)    Temp:  [97.8 °F (36.6 °C)-99.5 °F (37.5 °C)] 98.2 °F (36.8 °C)  HR:  [50-64] 58  Resp:  [16-20] 16  BP: ()/(49-81) 128/69  SpO2:  [92 %-99 %] 95 %  Body mass index is 30.99 kg/m².     Input and Output Summary (last 24 hours):        Intake/Output Summary (Last 24 hours) at 9/6/2024 1041  Last data filed at 9/6/2024 0910  Gross per 24 hour   Intake 1320 ml   Output 750 ml   Net 570 ml       Physical Exam:     Physical Exam  Constitutional:       General: He is not in acute distress.     Appearance: He is well-developed. He is not diaphoretic.   HENT:      Head: Normocephalic and atraumatic.      Nose: Nose normal.      Mouth/Throat:      Pharynx: No oropharyngeal exudate.   Eyes:      General: No scleral icterus.     Conjunctiva/sclera: Conjunctivae normal.   Cardiovascular:      Rate and Rhythm: Normal rate and regular rhythm.      Heart sounds: Normal heart sounds. No murmur heard.     No friction rub. No gallop.   Pulmonary:      Effort: Pulmonary effort is normal. No respiratory distress.      Breath sounds: Normal breath sounds. No wheezing or rales.   Chest:      Chest wall: No tenderness.   Abdominal:      General: Bowel sounds are normal. There is no distension.      Palpations: Abdomen is soft.      Tenderness: There is no abdominal tenderness. There is no guarding.   Musculoskeletal:         General: No tenderness, deformity or edema. Normal range of motion.      Cervical back: Normal range of motion and neck supple.   Skin:     General: Skin is warm and dry.      Findings: No erythema.   Neurological:      Mental Status: He is alert. Mental status is at baseline.   Psychiatric:         Mood and Affect: Mood and affect normal.         Additional Data:     Labs:    Results from last 7 days   Lab Units 09/06/24  0508 09/05/24  0519   WBC Thousand/uL 10.50* 10.03   HEMOGLOBIN g/dL 12.5 12.9   HEMATOCRIT % 40.7 40.7   PLATELETS Thousands/uL 295 305   BANDS PCT % 5  --    SEGS PCT %  --  60   LYMPHO PCT % 28 30   MONO PCT % 4 5   EOS PCT % 6 3     Results from last 7 days   Lab Units 09/06/24  0508 09/03/24  0526 09/02/24  0545   SODIUM mmol/L 136   < > 132*   POTASSIUM mmol/L 4.0   < > 4.2   CHLORIDE mmol/L 103   < > 100   CO2 mmol/L  27   < > 25   BUN mg/dL 24   < > 34*   CREATININE mg/dL 1.06   < > 1.05   ANION GAP mmol/L 6   < > 7   CALCIUM mg/dL 8.6   < > 8.6   ALBUMIN g/dL  --   --  3.1*   TOTAL BILIRUBIN mg/dL  --   --  0.39   ALK PHOS U/L  --   --  95   ALT U/L  --   --  13   AST U/L  --   --  13   GLUCOSE RANDOM mg/dL 225*   < > 375*    < > = values in this interval not displayed.         Results from last 7 days   Lab Units 09/06/24  0929 09/06/24  0730 09/05/24  2111 09/05/24  1612 09/05/24  1103 09/05/24  0739 09/04/24  2047 09/04/24  1542 09/04/24  1219 09/04/24  0728 09/03/24  2055 09/03/24  1547   POC GLUCOSE mg/dl 210* 207* 247* 220* 216* 136 165* 206* 185* 222* 312* 130     Results from last 7 days   Lab Units 09/01/24  0756   HEMOGLOBIN A1C % 9.8*     Results from last 7 days   Lab Units 09/02/24  0545 09/01/24  0547   LACTIC ACID mmol/L  --  1.7   PROCALCITONIN ng/ml 0.24 0.09           * I Have Reviewed All Lab Data Listed Above.  * Additional Pertinent Lab Tests Reviewed: All Labs Within Last 24 Hours Reviewed    Imaging:    Imaging Reports Reviewed Today Include: na  Imaging Personally Reviewed by Myself Includes:  na    Recent Cultures (last 7 days):     Results from last 7 days   Lab Units 09/01/24  0557 09/01/24  0547   BLOOD CULTURE  No Growth After 4 Days. No Growth After 4 Days.       Last 24 Hours Medication List:   Current Facility-Administered Medications   Medication Dose Route Frequency Provider Last Rate    amLODIPine  10 mg Oral Daily Robb Kim DPM      aspirin  81 mg Oral Daily Robb Kim DPM      atorvastatin  40 mg Oral Daily Robb Kim DPM      cefTRIAXone  1,000 mg Intravenous Q24H Robb Kim DPM 1,000 mg (09/06/24 0504)    furosemide  40 mg Oral Daily Robb Kim DPM      heparin (porcine)  5,000 Units Subcutaneous Q8H [facility] Robb Kim DPM      insulin glargine  15 Units Subcutaneous MERNA Kim DPM      insulin lispro  1-6 Units Subcutaneous MERNA Kim DPM       insulin lispro  2-12 Units Subcutaneous TID AC Robb Kim DPM      lactated ringers  20 mL/hr Intravenous Continuous Marylou Haris Gill MD      lisinopril  5 mg Oral Daily Robb Kim DPM      metoprolol succinate  50 mg Oral Daily Robb Kim DPM      ondansetron  4 mg Intravenous Q6H PRN Robb Kim DPM      pantoprazole  40 mg Oral Early Morning Robb Kim DPM      vancomycin  1,000 mg Intravenous Q12H Robb Kim DPM          Today, Patient Was Seen By: Landon Dasilva MD    ** Please Note: Dictation voice to text software may have been used in the creation of this document. **

## 2024-09-07 PROBLEM — A41.9 SEPSIS (HCC): Status: ACTIVE | Noted: 2024-09-07

## 2024-09-07 LAB
ANION GAP SERPL CALCULATED.3IONS-SCNC: 5 MMOL/L (ref 4–13)
BASOPHILS # BLD AUTO: 0.04 THOUSANDS/ÂΜL (ref 0–0.1)
BASOPHILS NFR BLD AUTO: 0 % (ref 0–1)
BUN SERPL-MCNC: 20 MG/DL (ref 5–25)
CALCIUM SERPL-MCNC: 8.6 MG/DL (ref 8.4–10.2)
CHLORIDE SERPL-SCNC: 104 MMOL/L (ref 96–108)
CO2 SERPL-SCNC: 26 MMOL/L (ref 21–32)
CREAT SERPL-MCNC: 0.83 MG/DL (ref 0.6–1.3)
EOSINOPHIL # BLD AUTO: 0.17 THOUSAND/ÂΜL (ref 0–0.61)
EOSINOPHIL NFR BLD AUTO: 1 % (ref 0–6)
ERYTHROCYTE [DISTWIDTH] IN BLOOD BY AUTOMATED COUNT: 14.3 % (ref 11.6–15.1)
GFR SERPL CREATININE-BSD FRML MDRD: 97 ML/MIN/1.73SQ M
GLUCOSE SERPL-MCNC: 142 MG/DL (ref 65–140)
GLUCOSE SERPL-MCNC: 145 MG/DL (ref 65–140)
GLUCOSE SERPL-MCNC: 171 MG/DL (ref 65–140)
GLUCOSE SERPL-MCNC: 195 MG/DL (ref 65–140)
GLUCOSE SERPL-MCNC: 213 MG/DL (ref 65–140)
HCT VFR BLD AUTO: 38.5 % (ref 36.5–49.3)
HGB BLD-MCNC: 12.3 G/DL (ref 12–17)
IMM GRANULOCYTES # BLD AUTO: 0.13 THOUSAND/UL (ref 0–0.2)
IMM GRANULOCYTES NFR BLD AUTO: 1 % (ref 0–2)
LYMPHOCYTES # BLD AUTO: 1.84 THOUSANDS/ÂΜL (ref 0.6–4.47)
LYMPHOCYTES NFR BLD AUTO: 14 % (ref 14–44)
MCH RBC QN AUTO: 27.1 PG (ref 26.8–34.3)
MCHC RBC AUTO-ENTMCNC: 31.9 G/DL (ref 31.4–37.4)
MCV RBC AUTO: 85 FL (ref 82–98)
MONOCYTES # BLD AUTO: 0.93 THOUSAND/ÂΜL (ref 0.17–1.22)
MONOCYTES NFR BLD AUTO: 7 % (ref 4–12)
NEUTROPHILS # BLD AUTO: 9.8 THOUSANDS/ÂΜL (ref 1.85–7.62)
NEUTS SEG NFR BLD AUTO: 77 % (ref 43–75)
NRBC BLD AUTO-RTO: 0 /100 WBCS
PLATELET # BLD AUTO: 265 THOUSANDS/UL (ref 149–390)
PMV BLD AUTO: 10.3 FL (ref 8.9–12.7)
POTASSIUM SERPL-SCNC: 4.1 MMOL/L (ref 3.5–5.3)
RBC # BLD AUTO: 4.54 MILLION/UL (ref 3.88–5.62)
SODIUM SERPL-SCNC: 135 MMOL/L (ref 135–147)
VANCOMYCIN SERPL-MCNC: 13.4 UG/ML (ref 10–20)
WBC # BLD AUTO: 12.91 THOUSAND/UL (ref 4.31–10.16)

## 2024-09-07 PROCEDURE — 80202 ASSAY OF VANCOMYCIN: CPT | Performed by: STUDENT IN AN ORGANIZED HEALTH CARE EDUCATION/TRAINING PROGRAM

## 2024-09-07 PROCEDURE — 80048 BASIC METABOLIC PNL TOTAL CA: CPT | Performed by: INTERNAL MEDICINE

## 2024-09-07 PROCEDURE — 85025 COMPLETE CBC W/AUTO DIFF WBC: CPT | Performed by: INTERNAL MEDICINE

## 2024-09-07 PROCEDURE — 82948 REAGENT STRIP/BLOOD GLUCOSE: CPT

## 2024-09-07 PROCEDURE — 99232 SBSQ HOSP IP/OBS MODERATE 35: CPT | Performed by: INTERNAL MEDICINE

## 2024-09-07 RX ADMIN — HEPARIN SODIUM 5000 UNITS: 5000 INJECTION INTRAVENOUS; SUBCUTANEOUS at 05:55

## 2024-09-07 RX ADMIN — HEPARIN SODIUM 5000 UNITS: 5000 INJECTION INTRAVENOUS; SUBCUTANEOUS at 15:24

## 2024-09-07 RX ADMIN — INSULIN LISPRO 2 UNITS: 100 INJECTION, SOLUTION INTRAVENOUS; SUBCUTANEOUS at 21:21

## 2024-09-07 RX ADMIN — INSULIN LISPRO 2 UNITS: 100 INJECTION, SOLUTION INTRAVENOUS; SUBCUTANEOUS at 08:15

## 2024-09-07 RX ADMIN — HEPARIN SODIUM 5000 UNITS: 5000 INJECTION INTRAVENOUS; SUBCUTANEOUS at 21:20

## 2024-09-07 RX ADMIN — PANTOPRAZOLE SODIUM 40 MG: 40 TABLET, DELAYED RELEASE ORAL at 05:55

## 2024-09-07 RX ADMIN — INSULIN GLARGINE 15 UNITS: 100 INJECTION, SOLUTION SUBCUTANEOUS at 21:20

## 2024-09-07 RX ADMIN — METOPROLOL SUCCINATE 50 MG: 50 TABLET, EXTENDED RELEASE ORAL at 08:14

## 2024-09-07 RX ADMIN — VANCOMYCIN HYDROCHLORIDE 1250 MG: 5 INJECTION, POWDER, LYOPHILIZED, FOR SOLUTION INTRAVENOUS at 09:53

## 2024-09-07 RX ADMIN — ATORVASTATIN CALCIUM 40 MG: 40 TABLET, FILM COATED ORAL at 08:14

## 2024-09-07 RX ADMIN — FUROSEMIDE 40 MG: 40 TABLET ORAL at 08:14

## 2024-09-07 RX ADMIN — AMLODIPINE BESYLATE 10 MG: 10 TABLET ORAL at 08:14

## 2024-09-07 RX ADMIN — ASPIRIN 81 MG: 81 TABLET, COATED ORAL at 08:15

## 2024-09-07 RX ADMIN — LISINOPRIL 5 MG: 5 TABLET ORAL at 08:14

## 2024-09-07 RX ADMIN — CEFTRIAXONE SODIUM 1000 MG: 10 INJECTION, POWDER, FOR SOLUTION INTRAVENOUS at 05:55

## 2024-09-07 RX ADMIN — INSULIN LISPRO 4 UNITS: 100 INJECTION, SOLUTION INTRAVENOUS; SUBCUTANEOUS at 12:20

## 2024-09-07 NOTE — PROGRESS NOTES
"Progress Note - Podiatric Surgery   Zain Gayle 59 y.o. male MRN: [id number]  Unit/Bed#: ICU 12 Encounter: [id number]    Assessment:  59M diabetic s/p right foot partial 3rd ray resection, 4th & 5th digit amps, and left hallux amp (9/6/24).    Plan:  -Patient seen & examined at bedside this morning.  -POD#1 RIGHT FOOT PARTIAL 3RD RAY RESECTION, 4TH & 5TH DIGIT AMPS AND LEFT HALLUX AMP  -Spoke to patient regarding outpatient care and follow-up.  Patient would benefit from a wound VAC however patient is concerned about paying for this.  Requesting case management regarding postoperative nursing care plan.  Concerns with insurance coverages. Explained to patient that he will need dressings changed routinely to ensure no recurrent infections.  -Left hallux amputation site had some drainage overnight.  Appreciate nursing care of this foot.  Today, I sutured closed some dehiscence areas to the surgical site.  Right foot surgical site wounds are intact and closed.  Patient still has right plantar submet 3 ulcer open.  This would be the site where I would recommend a wound VAC however there is financial concerns regarding postoperative care as expressed by the patient.  -Recommended nonweightbearing to the right foot, will allow weightbearing as tolerated to the left heel for transfers.  Please dispense bilateral postoperative shoes.    -There is no further podiatric surgical intervention anticipated during this patient's admission.  Will defer to medicine regarding antibiotic plans.        Subjective: Patient resting comfortably in bed.    Vitals: Blood pressure 105/63, pulse 70, temperature 98.3 °F (36.8 °C), resp. rate 18, height 5' 11\" (1.803 m), weight 100 kg (221 lb 1.9 oz), SpO2 95%.,Body mass index is 30.84 kg/m².      Intake/Output Summary (Last 24 hours) at 9/7/2024 0811  Last data filed at 9/7/2024 0601  Gross per 24 hour   Intake 1161 ml   Output 600 ml   Net 561 ml       Invasive Devices       Peripheral " Intravenous Line  Duration             Peripheral IV 09/06/24 Dorsal (posterior);Right Hand 1 day                    Physical Exam:   Vascular:   -DP pulse is weakly palpable B/L, PT pulse is palpable B/L   -Capillary refill time is less than 3 seconds B/L  -Pedal hair growth is diminished B/L  -Temperature is warm to cool from ankle to toes B/L   -There is mild edema noted to the B/L LE     Neuro:  -Light touch and protective sensation is diminished        Derm:  -Right foot status post fourth and fifth digit amputations.  All surgical site wounds are closely reapproximated with suture.  Right plantar submet 3 ulcer still open no longer probes to bone as this bone was resected.  -Left foot status post hallux amputation, surgical site was resutured today by myself.    Clinical Images:                Lab, Imaging and other studies: I have personally reviewed pertinent lab results.    Robb Kim DPM  [facility]  [facility] Katherin Waters, [place], [place]

## 2024-09-07 NOTE — PROGRESS NOTES
Zain Gayle is a 59 y.o. male who is currently ordered Vancomycin IV with management by the Pharmacy Consult service.  Relevant clinical data and objective / subjective history reviewed.  Vancomycin Assessment:  Indication and Goal AUC/Trough: Bone/joint infection (goal -600, trough >10); Soft tissue (goal -600, trough >10), -600, trough >10  Clinical Status: stable  Micro:     Renal Function:  SCr: 0.83 mg/dL  CrCl: 103.3 mL/min  Renal replacement: Not on dialysis  Days of Therapy: 7  Current Dose: 1250mg IV q12h  Vancomycin Plan:  New Dosinmg IV q12h  Estimated AUC: 480 mcg*hr/mL  Estimated Trough: 13.4 mcg/mL  Next Level: 24 with AM labs  Renal Function Monitoring: Daily BMP and UOP  Pharmacy will continue to follow closely for s/sx of nephrotoxicity, infusion reactions and appropriateness of therapy.  BMP and CBC will be ordered per protocol. We will continue to follow the patient’s culture results and clinical progress daily.    Crystal Mota, Pharmacist

## 2024-09-07 NOTE — ASSESSMENT & PLAN NOTE
present on admission, in patient with B/L pressure ulcers of feet and possible osteomyelitis, evidenced by leukocytosis (WBC 17.4), tachycardia (), tachypnea (RR 24), requiring treatment with IV Rocephin/Vancomycin and blood cultures.

## 2024-09-07 NOTE — ASSESSMENT & PLAN NOTE
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 Some marrow change also noted distal aspect of first proximal phalanx again suspicious for osteomyelitis and presumptive intervening intermetatarsal septic arthropathy\"   MRI of right foot \" Third submetatarsal skin ulceration with marrow change involving the residual third metatarsal consistent with osteomyelitis as described.  2. T1 replacement signal throughout the fourth proximal phalanx with corresponding increased T2 signal and post gadolinium enhancement, again consistent with osteomyelitis\"  Underwent OR intervention by podiatry on 9/6 left foot hallux amputation right foot third and fourth digit amputation  Continue vancomycin  PT/OT  Will need home VNA/PT/OT  Follow-up further podiatry recommendations  "

## 2024-09-07 NOTE — PROGRESS NOTES
"[facility]  Progress Note  Name: Zain Gayle I  MRN: [id number]  Unit/Bed#: ICU 12 I Date of Admission: 9/1/2024   Date of Service: 9/7/2024 I Hospital Day: 6    Assessment & Plan   * Acute respiratory failure with hypoxia (HCC)  Assessment & Plan  Likely secondary to CHF exacerbation   CT negative for PE with overall appearance of volume overload  Required BIPAP for a short period of time   Since resolved now saturating adequately on room air    Sepsis (HCC)  Assessment & Plan  present on admission, in patient with B/L pressure ulcers of feet and possible osteomyelitis, evidenced by leukocytosis (WBC 17.4), tachycardia (), tachypnea (RR 24), requiring treatment with IV Rocephin/Vancomycin and blood cultures.         Diabetic foot infection  (HCC)  Assessment & Plan  Right foot ulcer initially caused by brown recluse spider resulting in osteomyelitis and right 3-digit amputation   Also with left plantar ulcer  High suspicion for osteomyelitis,  MRI of left foot revealed \"Plantar skin ulceration great toe with marrow change first distal phalanx consistent with osteomyelitis. Some marrow change also noted distal aspect of first proximal phalanx again suspicious for osteomyelitis and presumptive intervening intermetatarsal septic arthropathy\"   MRI of right foot \" Third submetatarsal skin ulceration with marrow change involving the residual third metatarsal consistent with osteomyelitis as described.  2. T1 replacement signal throughout the fourth proximal phalanx with corresponding increased T2 signal and post gadolinium enhancement, again consistent with osteomyelitis\"  Underwent OR intervention by podiatry on 9/6 left foot hallux amputation right foot third and fourth digit amputation  Continue vancomycin  PT/OT  Will need home VNA/PT/OT  Follow-up further podiatry recommendations    Gastroesophageal reflux disease without esophagitis  Assessment & Plan  Continue PPI    Essential " hypertension  Assessment & Plan  BP controlled  Continue Lasix, lisinopril, amlodipine, beta-blocker    ODALYS (acute kidney injury) ([facility])  Assessment & Plan  Baseline crt 1.1-1.25  Presenting with crt 1.57  Likely secondary to chf exacerbation   Now back to baseline continue to monitor    Acute exacerbation of CHF (congestive heart failure) ([facility])  Assessment & Plan  Wt Readings from Last 3 Encounters:   24 100 kg (221 lb 1.9 oz)   10/05/21 104 kg (229 lb)     Presented with acute onset shortness of breath  CT negative for PE with overall appearance of volume overload  Clinically, he appears fairly euvolemic  Resolved with IV diuretics  Since been transitioned to oral Lasix 40 daily  Echo revealed normal EF         VTE Pharmacologic Prophylaxis:   Pharmacologic: Heparin  Mechanical VTE Prophylaxis in Place: Yes    Patient Centered Rounds: I have performed bedside rounds with nursing staff today.    Discussions with Specialists or Other Care Team Provider: cm, nursing    Education and Discussions with Family / Patient: pt    Time Spent for Care: 30 minutes.  More than 50% of total time spent on counseling and coordination of care as described above.    Current Length of Stay: 6 day(s)    Current Patient Status: Inpatient   Certification Statement: The patient will continue to require additional inpatient hospital stay due to see below    Discharge Plan: Pending home PT/OT set up and further evaluation by podiatry.  Anticipate discharge in next 24 to 48 hours    Code Status: Level 1 - Full Code      Subjective:   Denies chest pain, shortness breath, cough or fevers or chills    Objective:     Vitals:   Temp (24hrs), Av.2 °F (36.8 °C), Min:98.1 °F (36.7 °C), Max:98.3 °F (36.8 °C)    Temp:  [98.1 °F (36.7 °C)-98.3 °F (36.8 °C)] 98.3 °F (36.8 °C)  HR:  [58-71] 71  Resp:  [16-18] 18  BP: (105-128)/(57-69) 105/63  SpO2:  [95 %-98 %] 96 %  Body mass index is 30.84 kg/m².     Input and Output Summary (last 24 hours):        Intake/Output Summary (Last 24 hours) at 9/7/2024 1008  Last data filed at 9/7/2024 0801  Gross per 24 hour   Intake 561 ml   Output 800 ml   Net -239 ml       Physical Exam:     Physical Exam  Constitutional:       General: He is not in acute distress.     Appearance: He is well-developed. He is not diaphoretic.   HENT:      Head: Normocephalic and atraumatic.      Nose: Nose normal.      Mouth/Throat:      Pharynx: No oropharyngeal exudate.   Eyes:      General: No scleral icterus.     Conjunctiva/sclera: Conjunctivae normal.   Cardiovascular:      Rate and Rhythm: Normal rate and regular rhythm.      Heart sounds: Normal heart sounds. No murmur heard.     No friction rub. No gallop.   Pulmonary:      Effort: Pulmonary effort is normal. No respiratory distress.      Breath sounds: Normal breath sounds. No wheezing or rales.   Chest:      Chest wall: No tenderness.   Abdominal:      General: Bowel sounds are normal. There is no distension.      Palpations: Abdomen is soft.      Tenderness: There is no abdominal tenderness. There is no guarding.   Musculoskeletal:         General: No tenderness, deformity or edema. Normal range of motion.      Cervical back: Normal range of motion and neck supple.   Skin:     General: Skin is warm and dry.      Findings: No erythema.   Neurological:      Mental Status: He is alert. Mental status is at baseline.   Psychiatric:         Mood and Affect: Mood and affect normal.           Additional Data:     Labs:    Results from last 7 days   Lab Units 09/07/24  0544 09/06/24  0508   WBC Thousand/uL 12.91* 10.50*   HEMOGLOBIN g/dL 12.3 12.5   HEMATOCRIT % 38.5 40.7   PLATELETS Thousands/uL 265 295   BANDS PCT %  --  5   SEGS PCT % 77*  --    LYMPHO PCT % 14 28   MONO PCT % 7 4   EOS PCT % 1 6     Results from last 7 days   Lab Units 09/07/24  0544 09/03/24  0526 09/02/24  0545   SODIUM mmol/L 135   < > 132*   POTASSIUM mmol/L 4.1   < > 4.2   CHLORIDE mmol/L 104   < > 100   CO2  mmol/L 26   < > 25   BUN mg/dL 20   < > 34*   CREATININE mg/dL 0.83   < > 1.05   ANION GAP mmol/L 5   < > 7   CALCIUM mg/dL 8.6   < > 8.6   ALBUMIN g/dL  --   --  3.1*   TOTAL BILIRUBIN mg/dL  --   --  0.39   ALK PHOS U/L  --   --  95   ALT U/L  --   --  13   AST U/L  --   --  13   GLUCOSE RANDOM mg/dL 145*   < > 375*    < > = values in this interval not displayed.         Results from last 7 days   Lab Units 09/07/24  0756 09/06/24  2113 09/06/24  1608 09/06/24  1122 09/06/24  0929 09/06/24  0730 09/05/24  2111 09/05/24  1612 09/05/24  1103 09/05/24  0739 09/04/24  2047 09/04/24  1542   POC GLUCOSE mg/dl 171* 184* 167* 187* 210* 207* 247* 220* 216* 136 165* 206*     Results from last 7 days   Lab Units 09/01/24  0756   HEMOGLOBIN A1C % 9.8*     Results from last 7 days   Lab Units 09/02/24  0545 09/01/24  0547   LACTIC ACID mmol/L  --  1.7   PROCALCITONIN ng/ml 0.24 0.09           * I Have Reviewed All Lab Data Listed Above.  * Additional Pertinent Lab Tests Reviewed: All Labs Within Last 24 Hours Reviewed    Imaging:    Imaging Reports Reviewed Today Include: na  Imaging Personally Reviewed by Myself Includes:  na    Recent Cultures (last 7 days):     Results from last 7 days   Lab Units 09/01/24  0557 09/01/24  0547   BLOOD CULTURE  No Growth After 5 Days. No Growth After 5 Days.       Last 24 Hours Medication List:   Current Facility-Administered Medications   Medication Dose Route Frequency Provider Last Rate    amLODIPine  10 mg Oral Daily Robb Kim DPM      aspirin  81 mg Oral Daily Robb Kim DPM      atorvastatin  40 mg Oral Daily Robb Kim DPM      cefTRIAXone  1,000 mg Intravenous Q24H Robb Kim DPM 1,000 mg (09/07/24 0555)    furosemide  40 mg Oral Daily Robb Kim DPM      heparin (porcine)  5,000 Units Subcutaneous Q8H [facility] Robb Kim DPM      insulin glargine  15 Units Subcutaneous MERNA Kim DPM      insulin lispro  1-6 Units Subcutaneous MERNA Kim DPM       insulin lispro  2-12 Units Subcutaneous TID AC Robb Kim DPM      lactated ringers  20 mL/hr Intravenous Continuous Marylou Haris Gill MD 20 mL/hr (09/06/24 1158)    lisinopril  5 mg Oral Daily Robb Kim DPM      metoprolol succinate  50 mg Oral Daily Robb Kim DPM      ondansetron  4 mg Intravenous Q6H PRN Robb Kim DPM      pantoprazole  40 mg Oral Early Morning Robb Kim DPM      vancomycin  1,250 mg Intravenous Q12H Robb Kim DPM 1,250 mg (09/07/24 0953)        Today, Patient Was Seen By: Landon Dasilva MD    ** Please Note: Dictation voice to text software may have been used in the creation of this document. **

## 2024-09-07 NOTE — ASSESSMENT & PLAN NOTE
Wt Readings from Last 3 Encounters:   09/07/24 100 kg (221 lb 1.9 oz)   10/05/21 104 kg (229 lb)     Presented with acute onset shortness of breath  CT negative for PE with overall appearance of volume overload  Clinically, he appears fairly euvolemic  Resolved with IV diuretics  Since been transitioned to oral Lasix 40 daily  Echo revealed normal EF

## 2024-09-08 LAB
ANION GAP SERPL CALCULATED.3IONS-SCNC: 5 MMOL/L (ref 4–13)
BASOPHILS # BLD AUTO: 0.03 THOUSANDS/ÂΜL (ref 0–0.1)
BASOPHILS NFR BLD AUTO: 0 % (ref 0–1)
BUN SERPL-MCNC: 23 MG/DL (ref 5–25)
CALCIUM SERPL-MCNC: 8.7 MG/DL (ref 8.4–10.2)
CHLORIDE SERPL-SCNC: 102 MMOL/L (ref 96–108)
CO2 SERPL-SCNC: 28 MMOL/L (ref 21–32)
CREAT SERPL-MCNC: 1.16 MG/DL (ref 0.6–1.3)
EOSINOPHIL # BLD AUTO: 0.22 THOUSAND/ÂΜL (ref 0–0.61)
EOSINOPHIL NFR BLD AUTO: 2 % (ref 0–6)
ERYTHROCYTE [DISTWIDTH] IN BLOOD BY AUTOMATED COUNT: 14.6 % (ref 11.6–15.1)
GFR SERPL CREATININE-BSD FRML MDRD: 69 ML/MIN/1.73SQ M
GLUCOSE SERPL-MCNC: 124 MG/DL (ref 65–140)
GLUCOSE SERPL-MCNC: 138 MG/DL (ref 65–140)
GLUCOSE SERPL-MCNC: 180 MG/DL (ref 65–140)
GLUCOSE SERPL-MCNC: 217 MG/DL (ref 65–140)
GLUCOSE SERPL-MCNC: 264 MG/DL (ref 65–140)
HCT VFR BLD AUTO: 35.9 % (ref 36.5–49.3)
HGB BLD-MCNC: 11.3 G/DL (ref 12–17)
IMM GRANULOCYTES # BLD AUTO: 0.17 THOUSAND/UL (ref 0–0.2)
IMM GRANULOCYTES NFR BLD AUTO: 1 % (ref 0–2)
LYMPHOCYTES # BLD AUTO: 2.35 THOUSANDS/ÂΜL (ref 0.6–4.47)
LYMPHOCYTES NFR BLD AUTO: 20 % (ref 14–44)
MCH RBC QN AUTO: 27.2 PG (ref 26.8–34.3)
MCHC RBC AUTO-ENTMCNC: 31.5 G/DL (ref 31.4–37.4)
MCV RBC AUTO: 86 FL (ref 82–98)
MONOCYTES # BLD AUTO: 1 THOUSAND/ÂΜL (ref 0.17–1.22)
MONOCYTES NFR BLD AUTO: 8 % (ref 4–12)
NEUTROPHILS # BLD AUTO: 8.2 THOUSANDS/ÂΜL (ref 1.85–7.62)
NEUTS SEG NFR BLD AUTO: 69 % (ref 43–75)
NRBC BLD AUTO-RTO: 0 /100 WBCS
PLATELET # BLD AUTO: 219 THOUSANDS/UL (ref 149–390)
PMV BLD AUTO: 11.1 FL (ref 8.9–12.7)
POTASSIUM SERPL-SCNC: 4.1 MMOL/L (ref 3.5–5.3)
RBC # BLD AUTO: 4.16 MILLION/UL (ref 3.88–5.62)
SODIUM SERPL-SCNC: 135 MMOL/L (ref 135–147)
WBC # BLD AUTO: 11.97 THOUSAND/UL (ref 4.31–10.16)

## 2024-09-08 PROCEDURE — 85025 COMPLETE CBC W/AUTO DIFF WBC: CPT | Performed by: INTERNAL MEDICINE

## 2024-09-08 PROCEDURE — 97163 PT EVAL HIGH COMPLEX 45 MIN: CPT

## 2024-09-08 PROCEDURE — 82948 REAGENT STRIP/BLOOD GLUCOSE: CPT

## 2024-09-08 PROCEDURE — 80048 BASIC METABOLIC PNL TOTAL CA: CPT | Performed by: INTERNAL MEDICINE

## 2024-09-08 PROCEDURE — 97167 OT EVAL HIGH COMPLEX 60 MIN: CPT

## 2024-09-08 PROCEDURE — 99232 SBSQ HOSP IP/OBS MODERATE 35: CPT | Performed by: INTERNAL MEDICINE

## 2024-09-08 RX ORDER — VANCOMYCIN HYDROCHLORIDE 1 G/200ML
1000 INJECTION, SOLUTION INTRAVENOUS EVERY 12 HOURS
Status: DISCONTINUED | OUTPATIENT
Start: 2024-09-08 | End: 2024-09-08

## 2024-09-08 RX ADMIN — INSULIN LISPRO 2 UNITS: 100 INJECTION, SOLUTION INTRAVENOUS; SUBCUTANEOUS at 11:43

## 2024-09-08 RX ADMIN — HEPARIN SODIUM 5000 UNITS: 5000 INJECTION INTRAVENOUS; SUBCUTANEOUS at 22:10

## 2024-09-08 RX ADMIN — HEPARIN SODIUM 5000 UNITS: 5000 INJECTION INTRAVENOUS; SUBCUTANEOUS at 05:00

## 2024-09-08 RX ADMIN — INSULIN LISPRO 4 UNITS: 100 INJECTION, SOLUTION INTRAVENOUS; SUBCUTANEOUS at 16:54

## 2024-09-08 RX ADMIN — ASPIRIN 81 MG: 81 TABLET, COATED ORAL at 09:50

## 2024-09-08 RX ADMIN — INSULIN LISPRO 3 UNITS: 100 INJECTION, SOLUTION INTRAVENOUS; SUBCUTANEOUS at 22:11

## 2024-09-08 RX ADMIN — INSULIN GLARGINE 15 UNITS: 100 INJECTION, SOLUTION SUBCUTANEOUS at 22:10

## 2024-09-08 RX ADMIN — HEPARIN SODIUM 5000 UNITS: 5000 INJECTION INTRAVENOUS; SUBCUTANEOUS at 14:44

## 2024-09-08 RX ADMIN — PANTOPRAZOLE SODIUM 40 MG: 40 TABLET, DELAYED RELEASE ORAL at 05:00

## 2024-09-08 RX ADMIN — CEFTRIAXONE SODIUM 1000 MG: 10 INJECTION, POWDER, FOR SOLUTION INTRAVENOUS at 05:00

## 2024-09-08 RX ADMIN — ATORVASTATIN CALCIUM 40 MG: 40 TABLET, FILM COATED ORAL at 09:50

## 2024-09-08 NOTE — ASSESSMENT & PLAN NOTE
Wt Readings from Last 3 Encounters:   09/08/24 95.4 kg (210 lb 5.1 oz)   10/05/21 104 kg (229 lb)     Presented with acute onset shortness of breath  CT negative for PE with overall appearance of volume overload  Clinically, he appears fairly euvolemic  Resolved with IV diuretics  Since been transitioned to oral Lasix 40 daily  Echo revealed normal EF

## 2024-09-08 NOTE — OCCUPATIONAL THERAPY NOTE
"    Occupational Therapy Evaluation     Patient Name: Zain Gayle  Today's Date: 9/8/2024  Problem List  Principal Problem:    Acute respiratory failure with hypoxia (HCC)  Active Problems:    Acute exacerbation of CHF (congestive heart failure) (HCC)    ODALYS (acute kidney injury) (HCC)    Aortic valve stenosis    Elevated troponin    Essential hypertension    Gastroesophageal reflux disease without esophagitis    Type 2 diabetes mellitus with hyperglycemia (HCC)    Diabetic foot infection  (HCC)    Sepsis (HCC)    Past Medical History  Past Medical History:   Diagnosis Date    Allergic     Arthritis     CHF (congestive heart failure) (HCC)     Coronary artery disease     Diabetes mellitus (HCC)     Diabetic foot ulcers (HCC)     Hypertension     Moderate aortic stenosis     MRSA exposure      Past Surgical History  Past Surgical History:   Procedure Laterality Date    CARPAL TUNNEL RELEASE      KNEE SURGERY      MD AMPUTATION METATARSAL W/TOE SINGLE Bilateral 9/6/2024    Procedure: RIGHT FOOT PARTIAL 3RD RAY RESECTION, 4TH AND 5TH DIGIT AMPUTATION, LEFT HALLUX AMPUTATION;  Surgeon: Robb Kim DPM;  Location: [facility];  Service: Podiatry    TOE AMPUTATION             09/08/24 1001   OT Last Visit   OT Visit Date 09/08/24   Note Type   Note type Evaluation   Pain Assessment   Pain Assessment Tool 0-10   Pain Score No Pain   Restrictions/Precautions   Weight Bearing Precautions Per Order Yes   RLE Weight Bearing Per Order NWB   LLE Weight Bearing Per Order PWB  (Heel weight bearing with surgical shoe)   Braces or Orthoses Other (Comment)  (Surgical shoe)   Other Precautions Fall Risk;Chair Alarm;Bed Alarm;WBS   Home Living   Type of Home House  (\"Tiny House\")   Home Layout One level;Ramped entrance;Performs ADLs on one level   Bathroom Shower/Tub Walk-in shower   Bathroom Toilet Raised  (Compost toilet on platform)   Bathroom Equipment Grab bars in shower;Shower chair   Bathroom Accessibility Accessible   Home " Equipment Cane   Prior Function   Level of [place] Independent with ADLs;Independent with functional mobility;Independent with IADLS   Lives With Alone   Receives Help From Family  (Sister lives [place] the same farm)   IADLs Independent with driving;Independent with meal prep;Independent with medication management   Falls in the last 6 months 1 to 4  (1 fall)   Vocational Full time employment  ()   Lifestyle   Autonomy Patient independent with all ADls and IADLs   Reciprocal Relationships Family   Service to Others Works as a .   Intrinsic Gratification Riding horses   General   Family/Caregiver Present No   Subjective   Subjective Patient agreeable to evaluation.   ADL   Where Assessed Other (Comment)  (ADL levels based on functional performance during OT evaluation.)   Eating Assistance 7  Independent   Eating Deficit None   Grooming Assistance 7  Independent   Grooming Deficit None   UB Bathing Assistance 6  Modified Independent   UB Bathing Deficit Setup   LB Bathing Assistance 5  Supervision/Setup   LB Bathing Deficit Setup  (Seated)   UB Dressing Assistance 6  Modified independent   UB Dressing Deficit Setup   LB Dressing Assistance 4  Minimal Assistance   LB Dressing Deficit Verbal cueing;Supervision/safety   Toileting Assistance    (CGA)   Toileting Deficit Verbal cueing;Supervison/safety   Bed Mobility   Supine to Sit 5  Supervision   Additional items Assist x 1;HOB elevated   Sit to Supine 5  Supervision   Additional items Assist x 1;Increased time required;Bedrails   Transfers   Sit to Stand 4  Minimal assistance   Additional items Assist x 1;Armrests   Stand to Sit 4  Minimal assistance   Additional items Assist x 1;Armrests;Verbal cues   Toilet transfer 4  Minimal assistance  (CGA)   Additional items Assist x 1;Commode   Functional Mobility   Functional Mobility 4  Minimal assistance   Additional items Rolling walker  (Cues to only WB through heel on left side, was  able to maintain NWB on RLE.)   Balance   Static Sitting Good   Dynamic Sitting Fair +   Static Standing Fair   Dynamic Standing Fair -   Ambulatory Poor +   Activity Tolerance   Activity Tolerance Patient tolerated treatment well   Medical Staff Made Aware Pt seen as a co-eval with PT Al due to the patient's co-morbidities, clinically unstable presentation, and present impairments which are a regression from the patient's baseline.   Nurse Made Aware CARLOS A Thomason aware of progress during evaluation.   RUE Assessment   RUE Assessment WFL   LUE Assessment   LUE Assessment WFL   Hand Function   Gross Motor Coordination Functional   Fine Motor Coordination Functional   Sensation   Light Touch No apparent deficits   Sharp/Dull No apparent deficits   Stereognosis No apparent deficits   Vision-Basic Assessment   Current Vision Wears glasses all the time   Perception   Inattention/Neglect Appears intact   Motor Planning Appears intact   Cognition   Overall Cognitive Status WFL   Arousal/Participation Alert;Cooperative  (Slightly impulsive with movements.)   Attention Within functional limits   Orientation Level Oriented X4   Memory Within functional limits   Following Commands Follows all commands and directions without difficulty   Assessment   Limitation Decreased ADL status;Decreased Safe judgement during ADL;Decreased self-care trans   Prognosis Good   Assessment Patient is a 59 y.o. male seen for OT evaluation s/p admit to [facility] on 9/1/2024 w/chest pain, feeling well and Acute respiratory failure with hypoxia ([facility]). Commorbidities affecting patient's functional performance at time of assessment include:  has a past medical history of Allergic, Arthritis, CHF (congestive heart failure) (HCC), Coronary artery disease, Diabetes mellitus (HCC), Diabetic foot ulcers (HCC), Hypertension, Moderate aortic stenosis, and MRSA exposure.  Orders placed for OT evaluation and treatment.  Performed at least two  patient identifiers during session including name and wristband.  Prior to admission, patient was independent with all ADL and IADL tasks.  Personal factors affecting patient at time of initial evaluation include: limited caregiver support, difficulty performing ADLs, and difficulty performing IADLs. Upon evaluation, patient requires modified independent assist for UB ADLs, minimal  assist for LB ADLs, transfers and functional ambulation in room and bathroom with minimum assistance x1 assist, with the use of Rolling Walker.  Patient is oriented x 4. Occupational performance is affected by the following deficits: impulsive behavior, dynamic sit/ stand balance deficit with poor standing tolerance time for self care and functional mobility, and decreased activity tolerance.  Patient to benefit from continued Occupational Therapy treatment while in the hospital to address deficits as defined above and maximize level of functional independence with ADLs and functional mobility. Occupational Performance areas to address include: grooming , bathing/ shower, dressing, toilet hygiene, transfer to all surfaces, functional mobility, and community mobility. From OT standpoint, recommendation at time of d/c would be Level III (Minimum Resource Intensity).   Plan   Treatment Interventions ADL retraining;Endurance training;Patient/family training;Compensatory technique education   Goal Expiration Date 09/22/24   OT Treatment Day 0   OT Frequency 2-3x/wk   Discharge Recommendation   Rehab Resource Intensity Level, OT III (Minimum Resource Intensity)   AM-PAC Daily Activity Inpatient   Lower Body Dressing 3   Bathing 3   Toileting 3   Upper Body Dressing 4   Grooming 4   Eating 4   Daily Activity Raw Score 21   Daily Activity Standardized Score (Calc for Raw Score >=11) 44.27   AM-PAC Applied Cognition Inpatient   Following a Speech/Presentation 4   Understanding Ordinary Conversation 4   Taking Medications 4   Remembering Where  Things Are Placed or Put Away 4   Remembering List of 4-5 Errands 4   Taking Care of Complicated Tasks 4   Applied Cognition Raw Score 24   Applied Cognition Standardized Score 62.21   Barthel Index   Feeding 10   Bathing 0   Grooming Score 0   Dressing Score 0   Bladder Score 10   Bowels Score 10   Toilet Use Score 5   Transfers (Bed/Chair) Score 5   Mobility (Level Surface) Score 0   Stairs Score 5   Barthel Index Score 45        Occupational Therapy Goals: In 7- 10 days:  1. Patient will complete LB ADLs at MI,  with the use of AE as indicated.  2. Patient will increase OOB/ sitting tolerance to 2-4 hours per day for increased participation in self care and leisure tasks with no s/s of exertion  3. Patient will increase standing tolerance time to  5  minutes with  Unilateral UE support to complete sink level ADLs @ MI level.   4.  Patient/ Family  will demonstrate competency with UE Home Exercise Program.   5. Patient will complete toileting tasks while maintaining WB status at MI level.

## 2024-09-08 NOTE — ASSESSMENT & PLAN NOTE
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 Some marrow change also noted distal aspect of first proximal phalanx again suspicious for osteomyelitis and presumptive intervening intermetatarsal septic arthropathy\"   MRI of right foot \" Third submetatarsal skin ulceration with marrow change involving the residual third metatarsal consistent with osteomyelitis as described.  2. T1 replacement signal throughout the fourth proximal phalanx with corresponding increased T2 signal and post gadolinium enhancement, again consistent with osteomyelitis\"  Underwent OR intervention by podiatry on 9/6 left foot hallux amputation right foot third and fourth digit amputation  Can DC vancomycin  Will continue ceftriaxone  Anticipate transition to oral Keflex to complete course  Will need home VNA/PT/OT  Follow-up further podiatry recommendations  "

## 2024-09-08 NOTE — PLAN OF CARE
Problem: PHYSICAL THERAPY ADULT  Goal: Performs mobility at highest level of function for planned discharge setting.  See evaluation for individualized goals.  Description: Treatment/Interventions: Functional transfer training, Elevations, Therapeutic exercise, Endurance training, Patient/family training, Equipment eval/education, Bed mobility, Gait training, Compensatory technique education, Spoke to nursing, OT  Equipment Recommended: Walker (RW)       See flowsheet documentation for full assessment, interventions and recommendations.  Outcome: Progressing  Note: Prognosis: Good  Problem List: Decreased strength, Decreased endurance, Impaired balance, Decreased coordination, Decreased safety awareness, Impaired judgement  Assessment: Pt is 59 y.o. male seen for PT evaluation s/p admit to [facility] on 9/1/2024 w/ Acute respiratory failure with hypoxia (HCC). PT consulted to assess pt's functional mobility and d/c needs. Order placed for PT eval and tx, w/ up w/ A order. Comorbidities affecting pt's physical performance at time of assessment include: sepsis, respiratory failure, diabetic foot infection, GERD, HTN, elevated troponin, ODALYS, CHF. PTA, pt was independent w/ all functional mobility w/ out AD, ambulates community distances and elevations, lives alone in single level house, and works full time. Personal factors affecting pt at time of IE include: inability to navigate community distances, unable to perform dynamic tasks in community, impulsivity, unable to perform physical activity, inability to perform IADLs, and inability to perform ADLs. Please find objective findings from PT assessment regarding body systems outlined above with impairments and limitations including weakness, impaired balance, decreased endurance, impaired coordination, gait deviations, decreased activity tolerance, decreased functional mobility tolerance, decreased safety awareness, impaired judgement, fall risk, and  orthopedic restrictions. The following objective measures performed on IE also reveal limitations: Barthel Index: 65/100, Modified Leslie: 3 (moderate disability), and AM-PAC 6-Clicks: 21/24. Pt's clinical presentation is currently unstable/unpredictable seen in pt's presentation of altered Wb'ing status on both feet, poor balance and control of walker, continued need of medical management and monitoring, decreased strength and balance, leading to an increased risk of falls, decreased endurance and activity tolerance limiting mobility  . Pt to benefit from continued PT tx to address deficits as defined above and maximize level of functional independent mobility and consistency. From PT/mobility standpoint, recommendation at time of d/c would be Level 3 Minimum resource utilization home with outpatient rehabilitation pending progress in order to facilitate return to PLOF.  Barriers to Discharge: Decreased caregiver support     Rehab Resource Intensity Level, PT: III (Minimum Resource Intensity)    See flowsheet documentation for full assessment.

## 2024-09-08 NOTE — PROGRESS NOTES
Zain Gayle is a 59 y.o. male who is currently ordered Vancomycin IV with management by the Pharmacy Consult service.  Relevant clinical data and objective / subjective history reviewed.  Vancomycin Assessment:  Indication and Goal AUC/Trough: Bone/joint infection (goal -600, trough >10), -600, trough >10  Clinical Status: stable  Micro:     Renal Function:  SCr: 1.16 mg/dL  CrCl: 66.6 mL/min  Renal replacement: Not on dialysis  Days of Therapy: 8  Current Dose: 1250mg IV q12h  Vancomycin Plan:  New Dosinmg IV q12h  Estimated AUC: 532 mcg*hr/mL  Estimated Trough: 16.5 mcg/mL  Next Level: 24 with AM labs  Renal Function Monitoring: Daily BMP and UOP  Pharmacy will continue to follow closely for s/sx of nephrotoxicity, infusion reactions and appropriateness of therapy.  BMP and CBC will be ordered per protocol. We will continue to follow the patient’s culture results and clinical progress daily.    Crystal Mota, Pharmacist

## 2024-09-08 NOTE — PROGRESS NOTES
"[facility], [facility]  Progress Note  Name: Zain Gayle I  MRN: [id number]  Unit/Bed#: -01 I Date of Admission: 9/1/2024   Date of Service: 9/8/2024 I Hospital Day: 7    Assessment & Plan   * Acute respiratory failure with hypoxia (HCC)  Assessment & Plan  Likely secondary to CHF exacerbation   CT negative for PE with overall appearance of volume overload  Required BIPAP for a short period of time   Since resolved now saturating adequately on room air    Sepsis (HCC)  Assessment & Plan  present on admission, in patient with B/L pressure ulcers of feet and possible osteomyelitis, evidenced by leukocytosis (WBC 17.4), tachycardia (), tachypnea (RR 24), requiring treatment with IV Rocephin/Vancomycin and blood cultures.         Diabetic foot infection  (HCC)  Assessment & Plan  Right foot ulcer initially caused by brown recluse spider resulting in osteomyelitis and right 3-digit amputation   Also with left plantar ulcer  High suspicion for osteomyelitis,  MRI of left foot revealed \"Plantar skin ulceration great toe with marrow change first distal phalanx consistent with osteomyelitis. Some marrow change also noted distal aspect of first proximal phalanx again suspicious for osteomyelitis and presumptive intervening intermetatarsal septic arthropathy\"   MRI of right foot \" Third submetatarsal skin ulceration with marrow change involving the residual third metatarsal consistent with osteomyelitis as described.  2. T1 replacement signal throughout the fourth proximal phalanx with corresponding increased T2 signal and post gadolinium enhancement, again consistent with osteomyelitis\"  Underwent OR intervention by podiatry on 9/6 left foot hallux amputation right foot third and fourth digit amputation  Can DC vancomycin  Will continue ceftriaxone  Anticipate transition to oral Keflex to complete course  Will need home VNA/PT/OT  Follow-up further podiatry recommendations    Gastroesophageal reflux " disease without esophagitis  Assessment & Plan  Continue PPI    Essential hypertension  Assessment & Plan  BP controlled  Continue Lasix, lisinopril, amlodipine, beta-blocker    Elevated troponin  Assessment & Plan  Trop >1200, suspect nonischemic myocardial injury but in setting of mod/severe AS may need to consider additional testing   Cardiology following  Echo ruled out ischemia  Currently without chest pain  No need for further evaluation at this time  Appreciate cardiology recommendations    ODALYS (acute kidney injury) ([facility])  Assessment & Plan  Baseline crt 1.1-1.25  Presenting with crt 1.57  Likely secondary to chf exacerbation   Now back to baseline continue to monitor    Acute exacerbation of CHF (congestive heart failure) ([facility])  Assessment & Plan  Wt Readings from Last 3 Encounters:   09/08/24 95.4 kg (210 lb 5.1 oz)   10/05/21 104 kg (229 lb)     Presented with acute onset shortness of breath  CT negative for PE with overall appearance of volume overload  Clinically, he appears fairly euvolemic  Resolved with IV diuretics  Since been transitioned to oral Lasix 40 daily  Echo revealed normal EF         VTE Pharmacologic Prophylaxis:   Pharmacologic: Heparin  Mechanical VTE Prophylaxis in Place: Yes    Patient Centered Rounds: I have performed bedside rounds with nursing staff today.    Discussions with Specialists or Other Care Team Provider: cm, nursing    Education and Discussions with Family / Patient: pt    Time Spent for Care: 30 minutes.  More than 50% of total time spent on counseling and coordination of care as described above.    Current Length of Stay: 7 day(s)    Current Patient Status: Inpatient   Certification Statement: The patient will continue to require additional inpatient hospital stay due to see below    Discharge Plan: Anticipate medically cleared tomorrow.  Awaiting home VNA/PT set up    Code Status: Level 1 - Full Code      Subjective:   Denies fevers, chills, cough, shortness breath,  chest pain    Objective:     Vitals:   Temp (24hrs), Av.6 °F (37 °C), Min:98.3 °F (36.8 °C), Max:98.9 °F (37.2 °C)    Temp:  [98.3 °F (36.8 °C)-98.9 °F (37.2 °C)] 98.3 °F (36.8 °C)  HR:  [58-67] 64  Resp:  [18-20] 18  BP: (101-109)/(58-64) 109/59  SpO2:  [94 %-97 %] 95 %  Body mass index is 29.33 kg/m².     Input and Output Summary (last 24 hours):       Intake/Output Summary (Last 24 hours) at 2024 1026  Last data filed at 2024 0501  Gross per 24 hour   Intake 600 ml   Output 1100 ml   Net -500 ml       Physical Exam:     Physical Exam  Constitutional:       General: He is not in acute distress.     Appearance: He is well-developed. He is not diaphoretic.   HENT:      Head: Normocephalic and atraumatic.      Nose: Nose normal.      Mouth/Throat:      Pharynx: No oropharyngeal exudate.   Eyes:      General: No scleral icterus.     Conjunctiva/sclera: Conjunctivae normal.   Cardiovascular:      Rate and Rhythm: Normal rate and regular rhythm.      Heart sounds: Normal heart sounds. No murmur heard.     No friction rub. No gallop.   Pulmonary:      Effort: Pulmonary effort is normal. No respiratory distress.      Breath sounds: Normal breath sounds. No wheezing or rales.   Chest:      Chest wall: No tenderness.   Abdominal:      General: Bowel sounds are normal. There is no distension.      Palpations: Abdomen is soft.      Tenderness: There is no abdominal tenderness. There is no guarding.   Musculoskeletal:         General: No tenderness, deformity or edema. Normal range of motion.      Cervical back: Normal range of motion and neck supple.   Skin:     General: Skin is warm and dry.      Findings: No erythema.   Neurological:      Mental Status: He is alert. Mental status is at baseline.   Psychiatric:         Mood and Affect: Mood and affect normal.       ( To be done    Additional Data:     Labs:    Results from last 7 days   Lab Units 24  0504 24  0544 24  0508   WBC Thousand/uL  11.97*   < > 10.50*   HEMOGLOBIN g/dL 11.3*   < > 12.5   HEMATOCRIT % 35.9*   < > 40.7   PLATELETS Thousands/uL 219   < > 295   BANDS PCT %  --   --  5   SEGS PCT % 69   < >  --    LYMPHO PCT % 20   < > 28   MONO PCT % 8   < > 4   EOS PCT % 2   < > 6    < > = values in this interval not displayed.     Results from last 7 days   Lab Units 09/08/24  0504 09/03/24  0526 09/02/24  0545   SODIUM mmol/L 135   < > 132*   POTASSIUM mmol/L 4.1   < > 4.2   CHLORIDE mmol/L 102   < > 100   CO2 mmol/L 28   < > 25   BUN mg/dL 23   < > 34*   CREATININE mg/dL 1.16   < > 1.05   ANION GAP mmol/L 5   < > 7   CALCIUM mg/dL 8.7   < > 8.6   ALBUMIN g/dL  --   --  3.1*   TOTAL BILIRUBIN mg/dL  --   --  0.39   ALK PHOS U/L  --   --  95   ALT U/L  --   --  13   AST U/L  --   --  13   GLUCOSE RANDOM mg/dL 124   < > 375*    < > = values in this interval not displayed.         Results from last 7 days   Lab Units 09/08/24  0727 09/07/24 2010 09/07/24  1507 09/07/24  1031 09/07/24  0756 09/06/24  2113 09/06/24  1608 09/06/24  1122 09/06/24  0929 09/06/24  0730 09/05/24  2111 09/05/24  1612   POC GLUCOSE mg/dl 138 195* 142* 213* 171* 184* 167* 187* 210* 207* 247* 220*         Results from last 7 days   Lab Units 09/02/24  0545   PROCALCITONIN ng/ml 0.24           * I Have Reviewed All Lab Data Listed Above.  * Additional Pertinent Lab Tests Reviewed: All Labs Within Last 24 Hours Reviewed    Imaging:    Imaging Reports Reviewed Today Include: na  Imaging Personally Reviewed by Myself Includes:  na    Recent Cultures (last 7 days):           Last 24 Hours Medication List:   Current Facility-Administered Medications   Medication Dose Route Frequency Provider Last Rate    amLODIPine  10 mg Oral Daily Robb Kim DPM      aspirin  81 mg Oral Daily Robb Kim DPM      atorvastatin  40 mg Oral Daily Robb Kim DPM      cefTRIAXone  1,000 mg Intravenous Q24H Robb Kim DPM 1,000 mg (09/08/24 0500)    furosemide  40 mg Oral Daily  Robb Kim DPM      heparin (porcine)  5,000 Units Subcutaneous Q8H CHRISTIANO Robb Kim DPM      insulin glargine  15 Units Subcutaneous HS Robb Kim DPM      insulin lispro  1-6 Units Subcutaneous HS Robb Kim DPM      insulin lispro  2-12 Units Subcutaneous TID AC Robb Kim DPM      lactated ringers  20 mL/hr Intravenous Continuous Marylou Haris Gill MD Stopped (09/08/24 0707)    lisinopril  5 mg Oral Daily Robb Kim DPM      metoprolol succinate  50 mg Oral Daily Robb Kim DPM      ondansetron  4 mg Intravenous Q6H PRN Robb Kim DPM      pantoprazole  40 mg Oral Early Morning Robb Kim DPM          Today, Patient Was Seen By: Landon Dasilva MD    ** Please Note: Dictation voice to text software may have been used in the creation of this document. **

## 2024-09-08 NOTE — PLAN OF CARE
Problem: OCCUPATIONAL THERAPY ADULT  Goal: Performs self-care activities at highest level of function for planned discharge setting.  See evaluation for individualized goals.  Description: Treatment Interventions: ADL retraining, Endurance training, Patient/family training, Compensatory technique education          See flowsheet documentation for full assessment, interventions and recommendations.   Note: Limitation: Decreased ADL status, Decreased Safe judgement during ADL, Decreased self-care trans  Prognosis: Good  Assessment: Patient is a 59 y.o. male seen for OT evaluation s/p admit to [facility] on 9/1/2024 w/chest pain, feeling well and Acute respiratory failure with hypoxia ([facility]). Commorbidities affecting patient's functional performance at time of assessment include:  has a past medical history of Allergic, Arthritis, CHF (congestive heart failure) ([facility]), Coronary artery disease, Diabetes mellitus (HCC), Diabetic foot ulcers ([facility]), Hypertension, Moderate aortic stenosis, and MRSA exposure.  Orders placed for OT evaluation and treatment.  Performed at least two patient identifiers during session including name and wristband.  Prior to admission, patient was independent with all ADL and IADL tasks.  Personal factors affecting patient at time of initial evaluation include: limited caregiver support, difficulty performing ADLs, and difficulty performing IADLs. Upon evaluation, patient requires modified independent assist for UB ADLs, minimal  assist for LB ADLs, transfers and functional ambulation in room and bathroom with minimum assistance x1 assist, with the use of Rolling Walker.  Patient is oriented x 4. Occupational performance is affected by the following deficits: impulsive behavior, dynamic sit/ stand balance deficit with poor standing tolerance time for self care and functional mobility, and decreased activity tolerance.  Patient to benefit from continued Occupational Therapy treatment while  in the hospital to address deficits as defined above and maximize level of functional independence with ADLs and functional mobility. Occupational Performance areas to address include: grooming , bathing/ shower, dressing, toilet hygiene, transfer to all surfaces, functional mobility, and community mobility. From OT standpoint, recommendation at time of d/c would be Level III (Minimum Resource Intensity).     Rehab Resource Intensity Level, OT: III (Minimum Resource Intensity)

## 2024-09-08 NOTE — PHYSICAL THERAPY NOTE
Physical Therapy Evaluation     Patient's Name: Zain Gayle    Admitting Diagnosis  Shortness of breath [R06.02]  CHF (congestive heart failure) (HCC) [I50.9]  Hypoxia [R09.02]  Productive cough [R05.8]  SIRS (systemic inflammatory response syndrome) (HCC) [R65.10]  Acute respiratory failure with hypoxia (HCC) [J96.01]    Problem List  Patient Active Problem List   Diagnosis    Acute exacerbation of CHF (congestive heart failure) (HCC)    Acute respiratory failure with hypoxia (HCC)    ODALYS (acute kidney injury) (HCC)    Aortic valve stenosis    CAD (coronary artery disease)    Diabetic neuropathy associated with type 2 diabetes mellitus (HCC)    Elevated troponin    Essential hypertension    Gastroesophageal reflux disease without esophagitis    Type 2 diabetes mellitus with hyperglycemia (HCC)    Diabetic foot infection  (HCC)    Sepsis (HCC)       Past Medical History  Past Medical History:   Diagnosis Date    Allergic     Arthritis     CHF (congestive heart failure) (HCC)     Coronary artery disease     Diabetes mellitus (HCC)     Diabetic foot ulcers (HCC)     Hypertension     Moderate aortic stenosis     MRSA exposure        Past Surgical History  Past Surgical History:   Procedure Laterality Date    CARPAL TUNNEL RELEASE      KNEE SURGERY      [place] AMPUTATION METATARSAL W/TOE SINGLE Bilateral 9/6/2024    Procedure: RIGHT FOOT PARTIAL 3RD RAY RESECTION, 4TH AND 5TH DIGIT AMPUTATION, LEFT HALLUX AMPUTATION;  Surgeon: Robb Kim DPM;  Location: [facility];  Service: Podiatry    TOE AMPUTATION            09/08/24 1002   Note Type   Note type Evaluation   Pain Assessment   Pain Assessment Tool 0-10   Pain Score No Pain   Restrictions/Precautions   Weight Bearing Precautions Per Order Yes   RLE Weight Bearing Per Order NWB   LLE Weight Bearing Per Order PWB  (WB through Heel in surgical shoe)   Braces or Orthoses Other (Comment)  (Surgical shoe)   Home Living   Type of Home House   Home Layout One  level;Ramped entrance;Performs ADLs on one level   Bathroom Shower/Tub Walk-in shower   Bathroom Toilet Raised  (Compost toilet on a platform)   Bathroom Equipment Built-in shower seat;Shower chair   Bathroom Accessibility Accessible   Home Equipment Cane   Prior Function   Level of [place] Independent with ADLs;Independent with functional mobility   Lives With Alone   Receives Help From Family  (Sister [place] the same [place])   IADLs Independent with driving;Independent with meal prep;Independent with medication management   Falls in the last 6 months 1 to 4  (1 fall)   Vocational Full time employment  ()   General   Family/Caregiver Present No   Cognition   Overall Cognitive Status WFL   Arousal/Participation Alert   Orientation Level Oriented X4   Memory Within functional limits   Following Commands Follows all commands and directions without difficulty   RLE Assessment   RLE Assessment X   Strength RLE   RLE Overall Strength 4/5   LLE Assessment   LLE Assessment X   Strength LLE   LLE Overall Strength 4/5   Vision-Basic Assessment   Current Vision Wears glasses all the time   Bed Mobility   Supine to Sit 5  Supervision   Additional items Assist x 1;Bedrails;Increased time required   Sit to Supine 5  Supervision   Additional items Assist x 1;Increased time required;Bedrails   Transfers   Sit to Stand 4  Minimal assistance   Additional items Assist x 2;Increased time required;Verbal cues   Stand to Sit 4  Minimal assistance   Additional items Assist x 2;Increased time required;Verbal cues   Toilet transfer 4  Minimal assistance   Additional items Assist x 2;Increased time required;Verbal cues;Commode   Additional Comments VC for hand placement and  monitoring WB'ing restrictions.   Ambulation/Elevation   Gait pattern Forward Flexion;Decreased foot clearance;Inconsistent sreedhar;Redundant gait at times;Short stride;Step to   Gait Assistance 4  Minimal assist   Additional items Assist x  1;Verbal cues   Assistive Device Rolling walker  (Pt told to  the middle of the walker so he doesnt lean posteriorly)   Distance 10 ft x 2   Ambulation/Elevation Additional Comments VC for hand placement and monitoring WB'ing restrictions.   Balance   Static Sitting Good   Dynamic Sitting Fair +   Static Standing Fair   Dynamic Standing Fair -   Ambulatory Poor +   Activity Tolerance   Activity Tolerance Patient tolerated treatment well   Medical Staff Made Aware Pt seen as co-evaluation with KRISTY Alicea due to patient's co-morbidities, clinically unstable presentation, and present impairments which are a regression from the patient's baseline.   Nurse Made Aware CARLOS A cosme   Assessment   Prognosis Good   Problem List Decreased strength;Decreased endurance;Impaired balance;Decreased coordination;Decreased safety awareness;Impaired judgement   Assessment Pt is 59 y.o. male seen for PT evaluation s/p admit to [facility] on 9/1/2024 w/ Acute respiratory failure with hypoxia (HCC). PT consulted to assess pt's functional mobility and d/c needs. Order placed for PT eval and tx, w/ up w/ A order. Comorbidities affecting pt's physical performance at time of assessment include: sepsis, respiratory failure, diabetic foot infection, GERD, HTN, elevated troponin, ODALYS, CHF. PTA, pt was independent w/ all functional mobility w/ out AD, ambulates community distances and elevations, lives alone in single level house, and works full time. Personal factors affecting pt at time of IE include: inability to navigate community distances, unable to perform dynamic tasks in community, impulsivity, unable to perform physical activity, inability to perform IADLs, and inability to perform ADLs. Please find objective findings from PT assessment regarding body systems outlined above with impairments and limitations including weakness, impaired balance, decreased endurance, impaired coordination, gait deviations, decreased  activity tolerance, decreased functional mobility tolerance, decreased safety awareness, impaired judgement, fall risk, and orthopedic restrictions. The following objective measures performed on IE also reveal limitations: Barthel Index: 65/100, Modified Leslie: 3 (moderate disability), and AM-PAC 6-Clicks: 21/24. Pt's clinical presentation is currently unstable/unpredictable seen in pt's presentation of altered Wb'ing status on both feet, poor balance and control of walker, continued need of medical management and monitoring, decreased strength and balance, leading to an increased risk of falls, decreased endurance and activity tolerance limiting mobility  . Pt to benefit from continued PT tx to address deficits as defined above and maximize level of functional independent mobility and consistency. From PT/mobility standpoint, recommendation at time of d/c would be Level 3 Minimum resource utilization home with outpatient rehabilitation pending progress in order to facilitate return to PLOF.   Barriers to Discharge Decreased caregiver support   Goals   STG Expiration Date 09/18/24   Short Term Goal #1 In 10 days: Increase bilateral LE strength 1 grade to facilitate independent mobility, Perform all bed mobility tasks with distant S to decrease caregiver burden, Perform all transfers with distant S to improve independence, Ambulate > 75 ft. with RW with distant S w/o LOB and w/ normalized gait pattern 100% of the time, Navigate 2 stairs with distant S with bilateral handrails to facilitate return to previous living environment, and Increase all balance 1 grade to decrease risk for falls   Plan   Treatment/Interventions Functional transfer training;Elevations;Therapeutic exercise;Endurance training;Patient/family training;Equipment eval/education;Bed mobility;Gait training;Compensatory technique education;Spoke to nursing;OT   PT Frequency 2-3x/wk   Discharge Recommendation   Rehab Resource Intensity Level, PT III  (Minimum Resource Intensity)   Equipment Recommended Walker  (RW)   AM-PAC Basic Mobility Inpatient   Turning in Flat Bed Without Bedrails 4   Lying on Back to Sitting on Edge of Flat Bed Without Bedrails 4   Moving Bed to Chair 4   Standing Up From Chair Using Arms 3   Walk in Room 4   Climb 3-5 Stairs With Railing 2   Basic Mobility Inpatient Raw Score 21   Basic Mobility Standardized Score 45.55   [facility] Highest Level Of Mobility   -[facility] Goal 6: Walk 10 steps or more   -HL Achieved 6: Walk 10 steps or more   Modified Leslie Scale   Modified [place] Scale 4   Barthel Index   Feeding 10   Bathing 5   Grooming Score 5   Dressing Score 5   Bladder Score 10   Bowels Score 10   Toilet Use Score 10   Transfers (Bed/Chair) Score 5   Mobility (Level Surface) Score 0   Stairs Score 5   Barthel Index Score 65         Sloan Sierra, PT

## 2024-09-09 LAB
ANION GAP SERPL CALCULATED.3IONS-SCNC: 5 MMOL/L (ref 4–13)
BASOPHILS # BLD AUTO: 0.06 THOUSANDS/ÂΜL (ref 0–0.1)
BASOPHILS NFR BLD AUTO: 1 % (ref 0–1)
BUN SERPL-MCNC: 19 MG/DL (ref 5–25)
CALCIUM SERPL-MCNC: 8.8 MG/DL (ref 8.4–10.2)
CHLORIDE SERPL-SCNC: 103 MMOL/L (ref 96–108)
CO2 SERPL-SCNC: 28 MMOL/L (ref 21–32)
CREAT SERPL-MCNC: 0.96 MG/DL (ref 0.6–1.3)
EOSINOPHIL # BLD AUTO: 0.28 THOUSAND/ÂΜL (ref 0–0.61)
EOSINOPHIL NFR BLD AUTO: 3 % (ref 0–6)
ERYTHROCYTE [DISTWIDTH] IN BLOOD BY AUTOMATED COUNT: 14.5 % (ref 11.6–15.1)
GFR SERPL CREATININE-BSD FRML MDRD: 87 ML/MIN/1.73SQ M
GLUCOSE SERPL-MCNC: 122 MG/DL (ref 65–140)
GLUCOSE SERPL-MCNC: 124 MG/DL (ref 65–140)
GLUCOSE SERPL-MCNC: 165 MG/DL (ref 65–140)
GLUCOSE SERPL-MCNC: 218 MG/DL (ref 65–140)
GLUCOSE SERPL-MCNC: 272 MG/DL (ref 65–140)
HCT VFR BLD AUTO: 36 % (ref 36.5–49.3)
HGB BLD-MCNC: 11.6 G/DL (ref 12–17)
IMM GRANULOCYTES # BLD AUTO: 0.17 THOUSAND/UL (ref 0–0.2)
IMM GRANULOCYTES NFR BLD AUTO: 2 % (ref 0–2)
LYMPHOCYTES # BLD AUTO: 2.13 THOUSANDS/ÂΜL (ref 0.6–4.47)
LYMPHOCYTES NFR BLD AUTO: 21 % (ref 14–44)
MCH RBC QN AUTO: 27.2 PG (ref 26.8–34.3)
MCHC RBC AUTO-ENTMCNC: 32.2 G/DL (ref 31.4–37.4)
MCV RBC AUTO: 85 FL (ref 82–98)
MONOCYTES # BLD AUTO: 0.83 THOUSAND/ÂΜL (ref 0.17–1.22)
MONOCYTES NFR BLD AUTO: 8 % (ref 4–12)
NEUTROPHILS # BLD AUTO: 6.86 THOUSANDS/ÂΜL (ref 1.85–7.62)
NEUTS SEG NFR BLD AUTO: 65 % (ref 43–75)
NRBC BLD AUTO-RTO: 0 /100 WBCS
PLATELET # BLD AUTO: 224 THOUSANDS/UL (ref 149–390)
PMV BLD AUTO: 10.7 FL (ref 8.9–12.7)
POTASSIUM SERPL-SCNC: 4 MMOL/L (ref 3.5–5.3)
RBC # BLD AUTO: 4.26 MILLION/UL (ref 3.88–5.62)
SODIUM SERPL-SCNC: 136 MMOL/L (ref 135–147)
WBC # BLD AUTO: 10.33 THOUSAND/UL (ref 4.31–10.16)

## 2024-09-09 PROCEDURE — 82948 REAGENT STRIP/BLOOD GLUCOSE: CPT

## 2024-09-09 PROCEDURE — 99232 SBSQ HOSP IP/OBS MODERATE 35: CPT | Performed by: INTERNAL MEDICINE

## 2024-09-09 PROCEDURE — 80048 BASIC METABOLIC PNL TOTAL CA: CPT | Performed by: INTERNAL MEDICINE

## 2024-09-09 PROCEDURE — 85025 COMPLETE CBC W/AUTO DIFF WBC: CPT | Performed by: INTERNAL MEDICINE

## 2024-09-09 RX ADMIN — ASPIRIN 81 MG: 81 TABLET, COATED ORAL at 09:24

## 2024-09-09 RX ADMIN — INSULIN LISPRO 4 UNITS: 100 INJECTION, SOLUTION INTRAVENOUS; SUBCUTANEOUS at 21:47

## 2024-09-09 RX ADMIN — LISINOPRIL 5 MG: 5 TABLET ORAL at 09:25

## 2024-09-09 RX ADMIN — INSULIN LISPRO 2 UNITS: 100 INJECTION, SOLUTION INTRAVENOUS; SUBCUTANEOUS at 17:51

## 2024-09-09 RX ADMIN — AMLODIPINE BESYLATE 10 MG: 10 TABLET ORAL at 09:24

## 2024-09-09 RX ADMIN — HEPARIN SODIUM 5000 UNITS: 5000 INJECTION INTRAVENOUS; SUBCUTANEOUS at 05:55

## 2024-09-09 RX ADMIN — HEPARIN SODIUM 5000 UNITS: 5000 INJECTION INTRAVENOUS; SUBCUTANEOUS at 13:33

## 2024-09-09 RX ADMIN — METOPROLOL SUCCINATE 50 MG: 50 TABLET, EXTENDED RELEASE ORAL at 09:24

## 2024-09-09 RX ADMIN — CEFTRIAXONE SODIUM 1000 MG: 10 INJECTION, POWDER, FOR SOLUTION INTRAVENOUS at 05:55

## 2024-09-09 RX ADMIN — PANTOPRAZOLE SODIUM 40 MG: 40 TABLET, DELAYED RELEASE ORAL at 05:55

## 2024-09-09 RX ADMIN — HEPARIN SODIUM 5000 UNITS: 5000 INJECTION INTRAVENOUS; SUBCUTANEOUS at 21:47

## 2024-09-09 RX ADMIN — INSULIN LISPRO 4 UNITS: 100 INJECTION, SOLUTION INTRAVENOUS; SUBCUTANEOUS at 12:34

## 2024-09-09 RX ADMIN — FUROSEMIDE 40 MG: 40 TABLET ORAL at 09:25

## 2024-09-09 RX ADMIN — ATORVASTATIN CALCIUM 40 MG: 40 TABLET, FILM COATED ORAL at 09:25

## 2024-09-09 RX ADMIN — INSULIN GLARGINE 15 UNITS: 100 INJECTION, SOLUTION SUBCUTANEOUS at 21:47

## 2024-09-09 NOTE — PLAN OF CARE
Problem: PAIN - ADULT  Goal: Verbalizes/displays adequate comfort level or baseline comfort level  Description: Interventions:  - Encourage patient to monitor pain and request assistance  - Assess pain using appropriate pain scale  - Administer analgesics based on type and severity of pain and evaluate response  - Implement non-pharmacological measures as appropriate and evaluate response  - Consider cultural and social influences on pain and pain management  - Notify physician/advanced practitioner if interventions unsuccessful or patient reports new pain  Outcome: Progressing     Problem: INFECTION - ADULT  Goal: Absence or prevention of progression during hospitalization  Description: INTERVENTIONS:  - Assess and monitor for signs and symptoms of infection  - Monitor lab/diagnostic results  - Monitor all insertion sites, i.e. indwelling lines, tubes, and drains  - Monitor endotracheal if appropriate and nasal secretions for changes in amount and color  - [place] appropriate cooling/warming therapies per order  - Administer medications as ordered  - Instruct and encourage patient and family to use good hand hygiene technique  - Identify and instruct in appropriate isolation precautions for identified infection/condition  Outcome: Progressing  Goal: Absence of fever/infection during neutropenic period  Description: INTERVENTIONS:  - Monitor WBC    Outcome: Progressing     Problem: SAFETY ADULT  Goal: Patient will remain free of falls  Description: INTERVENTIONS:  - Educate patient/family on patient safety including physical limitations  - Instruct patient to call for assistance with activity   - Consult OT/PT to assist with strengthening/mobility   - Keep Call bell within reach  - Keep bed low and locked with side rails adjusted as appropriate  - Keep care items and personal belongings within reach  - Initiate and maintain comfort rounds  - Make Fall Risk Sign visible to staff  - Offer Toileting every 2 Hours,  in advance of need  - Initiate/Maintain bed alarm  - Obtain necessary fall risk management equipment: bed alarm non skid socks   - Apply yellow socks and bracelet for high fall risk patients  - Consider moving patient to room near nurses station  Outcome: Progressing  Goal: Maintain or return to baseline ADL function  Description: INTERVENTIONS:  -  Assess patient's ability to carry out ADLs; assess patient's baseline for ADL function and identify physical deficits which impact ability to perform ADLs (bathing, care of mouth/teeth, toileting, grooming, dressing, etc.)  - Assess/evaluate cause of self-care deficits   - Assess range of motion  - Assess patient's mobility; develop plan if impaired  - Assess patient's need for assistive devices and provide as appropriate  - Encourage maximum independence but intervene and supervise when necessary  - Involve family in performance of ADLs  - Assess for home care needs following discharge   - Consider OT consult to assist with ADL evaluation and planning for discharge  - Provide patient education as appropriate  Outcome: Progressing  Goal: Maintains/Returns to pre admission functional level  Description: INTERVENTIONS:  - Perform AM-PAC 6 Click Basic Mobility/ Daily Activity assessment daily.  - Set and communicate daily mobility goal to care team and patient/family/caregiver.   - Collaborate with rehabilitation services on mobility goals if consulted  - Perform Range of Motion 3 times a day.  - Reposition patient every 3 hours.  - Dangle patient 3 times a day  - Stand patient 3 times a day  - Ambulate patient 3 times a day  - Out of bed to chair 3 times a day   - Out of bed for meals 3 times a day  - Out of bed for toileting  - Record patient progress and toleration of activity level   Outcome: Progressing     Problem: DISCHARGE PLANNING  Goal: Discharge to home or other facility with appropriate resources  Description: INTERVENTIONS:  - Identify barriers to discharge  w/patient and caregiver  - Arrange for needed discharge resources and transportation as appropriate  - Identify discharge learning needs (meds, wound care, etc.)  - Arrange for interpretive services to assist at discharge as needed  - Refer to Case Management Department for coordinating discharge planning if the patient needs post-hospital services based on physician/advanced practitioner order or complex needs related to functional status, cognitive ability, or social support system  Outcome: Progressing     Problem: Knowledge Deficit  Goal: Patient/family/caregiver demonstrates understanding of disease process, treatment plan, medications, and discharge instructions  Description: Complete learning assessment and assess knowledge base.  Interventions:  - Provide teaching at level of understanding  - Provide teaching via preferred learning methods  Outcome: Progressing     Problem: Nutrition/Hydration-ADULT  Goal: Nutrient/Hydration intake appropriate for improving, restoring or maintaining nutritional needs  Description: Monitor and assess patient's nutrition/hydration status for malnutrition. Collaborate with interdisciplinary team and initiate plan and interventions as ordered.  Monitor patient's weight and dietary intake as ordered or per policy. Utilize nutrition screening tool and intervene as necessary. Determine patient's food preferences and provide high-protein, high-caloric foods as appropriate.     INTERVENTIONS:  - Monitor oral intake, urinary output, labs, and treatment plans  - Assess nutrition and hydration status and recommend course of action  - Evaluate amount of meals eaten  - Assist patient with eating if necessary   - Allow adequate time for meals  - Recommend/ encourage appropriate diets, oral nutritional supplements, and vitamin/mineral supplements  - Order, calculate, and assess calorie counts as needed  - Recommend, monitor, and adjust tube feedings and TPN/PPN based on assessed needs  -  Assess need for intravenous fluids  - Provide specific nutrition/hydration education as appropriate  - Include patient/family/caregiver in decisions related to nutrition  Outcome: Progressing     Problem: Prexisting or High Potential for Compromised Skin Integrity  Goal: Skin integrity is maintained or improved  Description: INTERVENTIONS:  - Identify patients at risk for skin breakdown  - Assess and monitor skin integrity  - Assess and monitor nutrition and hydration status  - Monitor labs   - Assess for incontinence   - Turn and reposition patient  - Assist with mobility/ambulation  - Relieve pressure over bony prominences  - Avoid friction and shearing  - Provide appropriate hygiene as needed including keeping skin clean and dry  - Evaluate need for skin moisturizer/barrier cream  - Collaborate with interdisciplinary team   - Patient/family teaching  - Consider wound care consult   Outcome: Progressing

## 2024-09-09 NOTE — PROGRESS NOTES
"[facility]  Progress Note  Name: Zain Gayle I  MRN: [id number]  Unit/Bed#: -01 I Date of Admission: 9/1/2024   Date of Service: 9/9/2024 I Hospital Day: 8    Assessment & Plan   * Acute respiratory failure with hypoxia (HCC)  Assessment & Plan  Likely secondary to CHF exacerbation   CT negative for PE with overall appearance of volume overload  Required BIPAP for a short period of time   Since resolved now saturating adequately on room air    Sepsis (HCC)  Assessment & Plan  present on admission, in patient with B/L pressure ulcers of feet and possible osteomyelitis, evidenced by leukocytosis (WBC 17.4), tachycardia (), tachypnea (RR 24), requiring treatment with IV Rocephin/Vancomycin and blood cultures.         Diabetic foot infection  (HCC)  Assessment & Plan  Right foot ulcer initially caused by brown recluse spider resulting in osteomyelitis and right 3-digit amputation   Also with left plantar ulcer  High suspicion for osteomyelitis,  MRI of left foot revealed \"Plantar skin ulceration great toe with marrow change first distal phalanx consistent with osteomyelitis. Some marrow change also noted distal aspect of first proximal phalanx again suspicious for osteomyelitis and presumptive intervening intermetatarsal septic arthropathy\"   MRI of right foot \" Third submetatarsal skin ulceration with marrow change involving the residual third metatarsal consistent with osteomyelitis as described.  2. T1 replacement signal throughout the fourth proximal phalanx with corresponding increased T2 signal and post gadolinium enhancement, again consistent with osteomyelitis\"  Underwent OR intervention by podiatry on 9/6 left foot hallux amputation right foot third and fourth digit amputation  Can DC vancomycin  Will continue ceftriaxone  Anticipate transition to oral Keflex to complete course  Will need home VNA/PT/OT  Follow-up further podiatry recommendations, may need wound VAC?  Will " discuss with podiatry    Type 2 diabetes mellitus with hyperglycemia ([facility])  Assessment & Plan  Continue Lantus 15 units nightly  Sliding-scale insulin  Monitor blood glucose    Gastroesophageal reflux disease without esophagitis  Assessment & Plan  Continue PPI    Essential hypertension  Assessment & Plan  BP controlled  Continue Lasix, lisinopril, amlodipine, beta-blocker    Elevated troponin  Assessment & Plan  Trop >1200, suspect nonischemic myocardial injury but in setting of mod/severe AS may need to consider additional testing   Cardiology following  Echo ruled out ischemia  Currently without chest pain  No need for further evaluation at this time  Appreciate cardiology recommendations    Aortic valve stenosis  Assessment & Plan  Noted to have moderate aortic stenosis with fused valve on echocardiogram in 2023  Patient has been discussing valve replacement with his outpatient cardiology   Continue outpatient follow-up with cardiology    ODALYS (acute kidney injury) ([facility])  Assessment & Plan  Baseline crt 1.1-1.25  Presenting with crt 1.57  Likely secondary to chf exacerbation   Now back to baseline continue to monitor    Acute exacerbation of CHF (congestive heart failure) ([facility])  Assessment & Plan  Wt Readings from Last 3 Encounters:   09/09/24 99.2 kg (218 lb 11.1 oz)   10/05/21 104 kg (229 lb)     Presented with acute onset shortness of breath  CT negative for PE with overall appearance of volume overload  Clinically, he appears fairly euvolemic  Resolved with IV diuretics  Since been transitioned to oral Lasix 40 daily  Echo revealed normal EF         VTE Pharmacologic Prophylaxis:   Pharmacologic: Heparin  Mechanical VTE Prophylaxis in Place: Yes    Patient Centered Rounds: I have performed bedside rounds with nursing staff today.    Discussions with Specialists or Other Care Team Provider: cm, nursing    Education and Discussions with Family / Patient: pt. declined family update    Time Spent for Care: 30  minutes.  More than 50% of total time spent on counseling and coordination of care as described above.    Current Length of Stay: 8 day(s)    Current Patient Status: Inpatient   Certification Statement: The patient will continue to require additional inpatient hospital stay due to see below    Discharge Plan: Pending further evaluation by podiatry and home VNA set up.  Anticipate discharge in next 24 to 48 hours    Code Status: Level 1 - Full Code      Subjective:   Denies chest pain, shortness of breath and cough or fevers, chills    Objective:     Vitals:   Temp (24hrs), Av.4 °F (36.9 °C), Min:97.7 °F (36.5 °C), Max:99 °F (37.2 °C)    Temp:  [97.7 °F (36.5 °C)-99 °F (37.2 °C)] 97.7 °F (36.5 °C)  HR:  [66-69] 66  Resp:  [18] 18  BP: (115-126)/(64-74) 126/74  SpO2:  [93 %-95 %] 93 %  Body mass index is 30.5 kg/m².     Input and Output Summary (last 24 hours):       Intake/Output Summary (Last 24 hours) at 2024 1115  Last data filed at 2024 0901  Gross per 24 hour   Intake 620 ml   Output 1100 ml   Net -480 ml       Physical Exam:     Physical Exam  Constitutional:       General: He is not in acute distress.     Appearance: He is well-developed. He is not diaphoretic.   HENT:      Head: Normocephalic and atraumatic.      Nose: Nose normal.      Mouth/Throat:      Pharynx: No oropharyngeal exudate.   Eyes:      General: No scleral icterus.     Conjunctiva/sclera: Conjunctivae normal.   Cardiovascular:      Rate and Rhythm: Normal rate and regular rhythm.      Heart sounds: Normal heart sounds. No murmur heard.     No friction rub. No gallop.   Pulmonary:      Effort: Pulmonary effort is normal. No respiratory distress.      Breath sounds: Normal breath sounds. No wheezing or rales.   Chest:      Chest wall: No tenderness.   Abdominal:      General: Bowel sounds are normal. There is no distension.      Palpations: Abdomen is soft.      Tenderness: There is no abdominal tenderness. There is no guarding.    Musculoskeletal:         General: No tenderness, deformity or edema. Normal range of motion.      Cervical back: Normal range of motion and neck supple.   Skin:     General: Skin is warm and dry.      Findings: No erythema.   Neurological:      Mental Status: He is alert. Mental status is at baseline.   Psychiatric:         Mood and Affect: Mood and affect normal.       (and then also across from   Additional Data:     Labs:    Results from last 7 days   Lab Units 09/09/24  0555 09/07/24  0544 09/06/24  0508   WBC Thousand/uL 10.33*   < > 10.50*   HEMOGLOBIN g/dL 11.6*   < > 12.5   HEMATOCRIT % 36.0*   < > 40.7   PLATELETS Thousands/uL 224   < > 295   BANDS PCT %  --   --  5   SEGS PCT % 65   < >  --    LYMPHO PCT % 21   < > 28   MONO PCT % 8   < > 4   EOS PCT % 3   < > 6    < > = values in this interval not displayed.     Results from last 7 days   Lab Units 09/09/24  0555   SODIUM mmol/L 136   POTASSIUM mmol/L 4.0   CHLORIDE mmol/L 103   CO2 mmol/L 28   BUN mg/dL 19   CREATININE mg/dL 0.96   ANION GAP mmol/L 5   CALCIUM mg/dL 8.8   GLUCOSE RANDOM mg/dL 124         Results from last 7 days   Lab Units 09/09/24  1059 09/09/24  0720 09/08/24  2109 09/08/24  1626 09/08/24  1129 09/08/24  0727 09/07/24 2010 09/07/24  1507 09/07/24  1031 09/07/24  0756 09/06/24  2113 09/06/24  1608   POC GLUCOSE mg/dl 218* 122 264* 217* 180* 138 195* 142* 213* 171* 184* 167*                   * I Have Reviewed All Lab Data Listed Above.  * Additional Pertinent Lab Tests Reviewed: All Labs Within Last 24 Hours Reviewed    Imaging:    Imaging Reports Reviewed Today Include: na  Imaging Personally Reviewed by Myself Includes:  na    Recent Cultures (last 7 days):         Last 24 Hours Medication List:   Current Facility-Administered Medications   Medication Dose Route Frequency Provider Last Rate    amLODIPine  10 mg Oral Daily Robb Kim DPM      aspirin  81 mg Oral Daily Robb Kim DPM      atorvastatin  40 mg Oral Daily  Robb Kim, JOSEPH      cefTRIAXone  1,000 mg Intravenous Q24H Robb Kim, DPM 1,000 mg (09/09/24 0540)    furosemide  40 mg Oral Daily Robb Kim DPM      heparin (porcine)  5,000 Units Subcutaneous Q8H CHRISTIANO Robb Kim, DPBING      insulin glargine  15 Units Subcutaneous HS Robb Kim, DPM      insulin lispro  1-6 Units Subcutaneous HS Robb Kim, DPBING      insulin lispro  2-12 Units Subcutaneous TID AC Robb Kim DPM      lactated ringers  20 mL/hr Intravenous Continuous Marylou Haris Gill MD Stopped (09/08/24 0429)    lisinopril  5 mg Oral Daily Robb Kim DPM      metoprolol succinate  50 mg Oral Daily Robb Kim DPM      ondansetron  4 mg Intravenous Q6H PRN Robb Kim, DPBING      pantoprazole  40 mg Oral Early Morning Robb Kim DPM          Today, Patient Was Seen By: Landon Dasilva MD    ** Please Note: Dictation voice to text software may have been used in the creation of this document. **

## 2024-09-09 NOTE — ASSESSMENT & PLAN NOTE
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 Some marrow change also noted distal aspect of first proximal phalanx again suspicious for osteomyelitis and presumptive intervening intermetatarsal septic arthropathy\"   MRI of right foot \" Third submetatarsal skin ulceration with marrow change involving the residual third metatarsal consistent with osteomyelitis as described.  2. T1 replacement signal throughout the fourth proximal phalanx with corresponding increased T2 signal and post gadolinium enhancement, again consistent with osteomyelitis\"  Underwent OR intervention by podiatry on 9/6 left foot hallux amputation right foot third and fourth digit amputation  Can DC vancomycin  Will continue ceftriaxone  Anticipate transition to oral Keflex to complete course  Will need home VNA/PT/OT  Follow-up further podiatry recommendations, may need wound VAC?  Will discuss with podiatry  "

## 2024-09-09 NOTE — ASSESSMENT & PLAN NOTE
Wt Readings from Last 3 Encounters:   09/09/24 99.2 kg (218 lb 11.1 oz)   10/05/21 104 kg (229 lb)     Presented with acute onset shortness of breath  CT negative for PE with overall appearance of volume overload  Clinically, he appears fairly euvolemic  Resolved with IV diuretics  Since been transitioned to oral Lasix 40 daily  Echo revealed normal EF

## 2024-09-09 NOTE — CASE MANAGEMENT
Case Management Discharge Planning Note    Patient name Zain Gayle  Location /-01 MRN [id number]  : 1965 Date 2024       Current Admission Date: 2024  Current Admission Diagnosis:Acute respiratory failure with hypoxia (HCC)   Patient Active Problem List    Diagnosis Date Noted Date Diagnosed    Sepsis ([facility]) 2024     Diabetic foot infection  ([facility]) 2024     Acute exacerbation of CHF (congestive heart failure) ([facility]) 10/15/2023     Acute respiratory failure with hypoxia ([facility]) 10/15/2023     ODALYS (acute kidney injury) ([facility]) 10/15/2023     Aortic valve stenosis 10/15/2023     CAD (coronary artery disease) 10/15/2023     Diabetic neuropathy associated with type 2 diabetes mellitus ([facility]) 10/15/2023     Essential hypertension 10/15/2023     Gastroesophageal reflux disease without esophagitis 10/15/2023     Type 2 diabetes mellitus with hyperglycemia ([facility]) 10/15/2023     Elevated troponin 01/15/2020       LOS (days): 8  Geometric Mean LOS (GMLOS) (days): 7  Days to GMLOS:-1.2     OBJECTIVE:  Risk of Unplanned Readmission Score: 12.56         Current admission status: Inpatient   Preferred Pharmacy:   [id number] [address]  [facility] [address]  Phone: [phone] Fax: [phone]    Primary Care Provider: No primary care provider on file.    Primary Insurance: [facility] AND [facility]  Secondary Insurance:     DISCHARGE DETAILS:                                          Other Referral/Resources/Interventions Provided:  Interventions: [facility]  Referral Comments: Pt is a tentative d/c in 48 hrs per [facility] during rounds.  [facility] accepted and reserved in [place].  Podiatry following.  May need wound vac on d/c.    Would you like to participate in our [facility] service program?  : No - Declined    Treatment Team Recommendation: Home with Home Health Care  Discharge  Destination Plan:: Home with Home Health Care  Transport at Discharge : Family

## 2024-09-10 LAB
GLUCOSE SERPL-MCNC: 149 MG/DL (ref 65–140)
GLUCOSE SERPL-MCNC: 158 MG/DL (ref 65–140)
GLUCOSE SERPL-MCNC: 241 MG/DL (ref 65–140)
GLUCOSE SERPL-MCNC: 274 MG/DL (ref 65–140)

## 2024-09-10 PROCEDURE — 99233 SBSQ HOSP IP/OBS HIGH 50: CPT | Performed by: INTERNAL MEDICINE

## 2024-09-10 PROCEDURE — 82948 REAGENT STRIP/BLOOD GLUCOSE: CPT

## 2024-09-10 RX ORDER — AMLODIPINE BESYLATE 5 MG/1
5 TABLET ORAL DAILY
Status: DISCONTINUED | OUTPATIENT
Start: 2024-09-11 | End: 2024-09-12 | Stop reason: HOSPADM

## 2024-09-10 RX ORDER — INSULIN GLARGINE 100 [IU]/ML
18 INJECTION, SOLUTION SUBCUTANEOUS
Status: DISCONTINUED | OUTPATIENT
Start: 2024-09-10 | End: 2024-09-12 | Stop reason: HOSPADM

## 2024-09-10 RX ADMIN — PANTOPRAZOLE SODIUM 40 MG: 40 TABLET, DELAYED RELEASE ORAL at 05:45

## 2024-09-10 RX ADMIN — INSULIN LISPRO 4 UNITS: 100 INJECTION, SOLUTION INTRAVENOUS; SUBCUTANEOUS at 11:44

## 2024-09-10 RX ADMIN — FUROSEMIDE 40 MG: 40 TABLET ORAL at 08:22

## 2024-09-10 RX ADMIN — INSULIN LISPRO 2 UNITS: 100 INJECTION, SOLUTION INTRAVENOUS; SUBCUTANEOUS at 16:47

## 2024-09-10 RX ADMIN — HEPARIN SODIUM 5000 UNITS: 5000 INJECTION INTRAVENOUS; SUBCUTANEOUS at 14:36

## 2024-09-10 RX ADMIN — INSULIN LISPRO 4 UNITS: 100 INJECTION, SOLUTION INTRAVENOUS; SUBCUTANEOUS at 21:52

## 2024-09-10 RX ADMIN — CEFTRIAXONE SODIUM 1000 MG: 10 INJECTION, POWDER, FOR SOLUTION INTRAVENOUS at 05:53

## 2024-09-10 RX ADMIN — INSULIN GLARGINE 18 UNITS: 100 INJECTION, SOLUTION SUBCUTANEOUS at 21:48

## 2024-09-10 RX ADMIN — HEPARIN SODIUM 5000 UNITS: 5000 INJECTION INTRAVENOUS; SUBCUTANEOUS at 21:48

## 2024-09-10 RX ADMIN — HEPARIN SODIUM 5000 UNITS: 5000 INJECTION INTRAVENOUS; SUBCUTANEOUS at 05:45

## 2024-09-10 RX ADMIN — ASPIRIN 81 MG: 81 TABLET, COATED ORAL at 08:21

## 2024-09-10 RX ADMIN — ATORVASTATIN CALCIUM 40 MG: 40 TABLET, FILM COATED ORAL at 08:21

## 2024-09-10 NOTE — ASSESSMENT & PLAN NOTE
BP controlled  Continue Lasix, lisinopril, amlodipine, beta-blocker  Amlodipine decreased to 5 mg due to lower BPs

## 2024-09-10 NOTE — ASSESSMENT & PLAN NOTE
Increased nightly Lantus to 18 units for better glycemic control  Sliding-scale insulin  Monitor blood glucose

## 2024-09-10 NOTE — PLAN OF CARE
Problem: INFECTION - ADULT  Goal: Absence or prevention of progression during hospitalization  Description: INTERVENTIONS:  - Assess and monitor for signs and symptoms of infection  - Monitor lab/diagnostic results  - Monitor all insertion sites, i.e. indwelling lines, tubes, and drains  - Monitor endotracheal if appropriate and nasal secretions for changes in amount and color  - Poca appropriate cooling/warming therapies per order  - Administer medications as ordered  - Instruct and encourage patient and family to use good hand hygiene technique  - Identify and instruct in appropriate isolation precautions for identified infection/condition  Outcome: Progressing     Problem: PAIN - ADULT  Goal: Verbalizes/displays adequate comfort level or baseline comfort level  Description: Interventions:  - Encourage patient to monitor pain and request assistance  - Assess pain using appropriate pain scale  - Administer analgesics based on type and severity of pain and evaluate response  - Implement non-pharmacological measures as appropriate and evaluate response  - Consider cultural and social influences on pain and pain management  - Notify physician/advanced practitioner if interventions unsuccessful or patient reports new pain  Outcome: Progressing     Problem: SAFETY ADULT  Goal: Patient will remain free of falls  Description: INTERVENTIONS:  - Educate patient/family on patient safety including physical limitations  - Instruct patient to call for assistance with activity   - Consult OT/PT to assist with strengthening/mobility   - Keep Call bell within reach  - Keep bed low and locked with side rails adjusted as appropriate  - Keep care items and personal belongings within reach  - Initiate and maintain comfort rounds  - Make Fall Risk Sign visible to staff  - Offer Toileting every 2 Hours, in advance of need  - Initiate/Maintain bed alarm  - Apply yellow socks and bracelet for high fall risk patients  - Consider  moving patient to room near nurses station  Outcome: Progressing

## 2024-09-10 NOTE — ASSESSMENT & PLAN NOTE
Likely secondary to CHF exacerbation   CT negative for PE with overall appearance of volume overload  Required BIPAP for a short period of time   Since resolved now saturating adequately on room air. Patient denies any SOB, chest pain, cough.

## 2024-09-10 NOTE — ASSESSMENT & PLAN NOTE
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 Some marrow change also noted distal aspect of first proximal phalanx again suspicious for osteomyelitis and presumptive intervening intermetatarsal septic arthropathy\"   MRI of right foot \" Third submetatarsal skin ulceration with marrow change involving the residual third metatarsal consistent with osteomyelitis as described.  2. T1 replacement signal throughout the fourth proximal phalanx with corresponding increased T2 signal and post gadolinium enhancement, again consistent with osteomyelitis\"  Underwent OR intervention by podiatry on 9/6 left foot hallux amputation right foot third and fourth digit amputation  DC vancomycin  Will continue IV ceftriaxone  Anticipate transition to oral Keflex upon discharge to complete course  Will need home VNA/PT/OT  Follow-up further podiatry recommendations, may need wound VAC?  Will discuss with podiatry  "

## 2024-09-10 NOTE — ASSESSMENT & PLAN NOTE
Wt Readings from Last 3 Encounters:   09/10/24 98.1 kg (216 lb 4.3 oz)   10/05/21 104 kg (229 lb)     Presented with acute onset shortness of breath  CT negative for PE with overall appearance of volume overload  Resolved with IV diuretics  Since been transitioned to oral Lasix 40 daily  Echo revealed normal EF  Patient currently appears euvolemic, no LL edema or JVD, SOB. Sat appropriate on RA

## 2024-09-10 NOTE — PROGRESS NOTES
Progress Note - Hospitalist   Name: Zain Gayle 59 y.o. male I MRN: [id number]  Unit/Bed#: -01 I Date of Admission: 9/1/2024   Date of Service: 9/10/2024 I Hospital Day: 9    Assessment & Plan  Acute respiratory failure with hypoxia (HCC)  Likely secondary to CHF exacerbation   CT negative for PE with overall appearance of volume overload  Required BIPAP for a short period of time   Since resolved now saturating adequately on room air. Patient denies any SOB, chest pain, cough.  Acute exacerbation of CHF (congestive heart failure) ([facility])  Wt Readings from Last 3 Encounters:   09/10/24 98.1 kg (216 lb 4.3 oz)   10/05/21 104 kg (229 lb)     Presented with acute onset shortness of breath  CT negative for PE with overall appearance of volume overload  Resolved with IV diuretics  Since been transitioned to oral Lasix 40 daily  Echo revealed normal EF  Patient currently appears euvolemic, no LL edema or JVD, SOB. Sat appropriate on RA  ODALYS (acute kidney injury) ([facility])  Baseline crt 1.1-1.25  Presenting with crt 1.57  Likely secondary to chf exacerbation   Now back to baseline continue to monitor  Aortic valve stenosis  Noted to have moderate aortic stenosis with fused valve on echocardiogram in 2023  Patient has been discussing valve replacement with his outpatient cardiology   Continue outpatient follow-up with cardiology  Elevated troponin  Trop >1200, suspect nonischemic myocardial injury but in setting of mod/severe AS may need to consider additional testing   Cardiology following  Echo ruled out ischemia  Currently without chest pain  No need for further evaluation at this time  Appreciate cardiology recommendations  Essential hypertension  BP controlled  Continue Lasix, lisinopril, amlodipine, beta-blocker  Amlodipine decreased to 5 mg due to lower BPs  Gastroesophageal reflux disease without esophagitis  Continue PPI  Type 2 diabetes mellitus with hyperglycemia (HCC)  Increased nightly Lantus to 18 units for  "better glycemic control  Sliding-scale insulin  Monitor blood glucose  Diabetic foot infection  (HCC)  Right foot ulcer initially caused by brown recluse spider resulting in osteomyelitis and right 3-digit amputation   Also with left plantar ulcer  High suspicion for osteomyelitis,  MRI of left foot revealed \"Plantar skin ulceration great toe with marrow change first distal phalanx consistent with osteomyelitis. Some marrow change also noted distal aspect of first proximal phalanx again suspicious for osteomyelitis and presumptive intervening intermetatarsal septic arthropathy\"   MRI of right foot \" Third submetatarsal skin ulceration with marrow change involving the residual third metatarsal consistent with osteomyelitis as described.  2. T1 replacement signal throughout the fourth proximal phalanx with corresponding increased T2 signal and post gadolinium enhancement, again consistent with osteomyelitis\"  Underwent OR intervention by podiatry on 9/6 left foot hallux amputation right foot third and fourth digit amputation  DC vancomycin  Will continue IV ceftriaxone  Anticipate transition to oral Keflex upon discharge to complete course  Will need home VNA/PT/OT  Follow-up further podiatry recommendations, may need wound VAC?  Will discuss with podiatry  Sepsis (HCC)  present on admission, in patient with B/L pressure ulcers of feet and possible osteomyelitis, evidenced by leukocytosis (WBC 17.4), tachycardia (), tachypnea (RR 24), requiring treatment with IV Rocephin/Vancomycin and blood cultures.  Patient currently afebrile, WBC's trending down (9/9 10.33), VS stable         VTE Pharmacologic Prophylaxis: VTE Score: 7 Moderate Risk (Score 3-4) - Pharmacological DVT Prophylaxis Ordered: heparin.    Mobility:   Basic Mobility Inpatient Raw Score: 18  JH-HLM Goal: 6: Walk 10 steps or more  JH-HLM Achieved: 6: Walk 10 steps or more  JH-HLM Goal achieved. Continue to encourage appropriate mobility.    Patient " Centered Rounds: I performed bedside rounds with nursing staff today.   Discussions with Specialists or Other Care Team Provider: None    Education and Discussions with Family / Patient: Patient informed me he would keep in contact with his family.    Current Length of Stay: 9 day(s)  Current Patient Status: Inpatient   Discharge Plan: Anticipate discharge in 24-48 hrs to home.    Code Status: Level 1 - Full Code    Subjective   I spoke with the patient this morning at bedside during AM rounds with PCA present. Patient is doing well. He denies any SOB, chest pain, n/v/d, chills or fevers. He endorses mild foot pain at night but this is expected and controlled. He is eating and drinking fine. Patient initially stated concern for low blood pressure readings in the low 100s/60s but has remained asymptomatic. He wants to stay in the hospital until at least tomorrow, ideally until Thursday, due to waiting for family to arrive for him be cared for at home.    Objective     Vitals:   Temp (24hrs), Av.3 °F (36.8 °C), Min:97.9 °F (36.6 °C), Max:98.5 °F (36.9 °C)    Temp:  [97.9 °F (36.6 °C)-98.5 °F (36.9 °C)] 98.4 °F (36.9 °C)  HR:  [55-66] 62  Resp:  [15] 15  BP: ()/(55-66) 107/58  SpO2:  [94 %-96 %] 96 %  Body mass index is 30.16 kg/m².     Input and Output Summary (last 24 hours):     Intake/Output Summary (Last 24 hours) at 9/10/2024 0918  Last data filed at 9/10/2024 0900  Gross per 24 hour   Intake 660 ml   Output 1425 ml   Net -765 ml       Physical Exam  HENT:      Head: Normocephalic.      Nose: No congestion or rhinorrhea.   Cardiovascular:      Rate and Rhythm: Normal rate and regular rhythm.      Pulses: Normal pulses.      Heart sounds: S1 normal and S2 normal. No murmur heard.     No gallop.   Abdominal:      General: Bowel sounds are normal. There is no distension.      Palpations: Abdomen is soft.      Tenderness: There is no abdominal tenderness.   Musculoskeletal:      Right lower leg: No edema.       Left lower leg: No edema.      Comments: Both feet are wrapped in bandages. Patient denies pain   Skin:     General: Skin is warm and dry.      Capillary Refill: Capillary refill takes less than 2 seconds.   Neurological:      General: No focal deficit present.      Mental Status: He is alert.   Psychiatric:         Mood and Affect: Mood normal.          Lines/Drains:  Lines/Drains/Airways       Active Status       None                            Lab Results: I have reviewed the following results:    Results from last 7 days   Lab Units 09/09/24  0555 09/07/24  0544 09/06/24  0508   WBC Thousand/uL 10.33*   < > 10.50*   HEMOGLOBIN g/dL 11.6*   < > 12.5   HEMATOCRIT % 36.0*   < > 40.7   PLATELETS Thousands/uL 224   < > 295   BANDS PCT %  --   --  5   SEGS PCT % 65   < >  --    LYMPHO PCT % 21   < > 28   MONO PCT % 8   < > 4   EOS PCT % 3   < > 6    < > = values in this interval not displayed.     Results from last 7 days   Lab Units 09/09/24  0555   SODIUM mmol/L 136   POTASSIUM mmol/L 4.0   CHLORIDE mmol/L 103   CO2 mmol/L 28   BUN mg/dL 19   CREATININE mg/dL 0.96   ANION GAP mmol/L 5   CALCIUM mg/dL 8.8   GLUCOSE RANDOM mg/dL 124         Results from last 7 days   Lab Units 09/10/24  0748 09/09/24  2103 09/09/24  1556 09/09/24  1059 09/09/24  0720 09/08/24  2109 09/08/24  1626 09/08/24  1129 09/08/24  0727 09/07/24  2010 09/07/24  1507 09/07/24  1031   POC GLUCOSE mg/dl 149* 272* 165* 218* 122 264* 217* 180* 138 195* 142* 213*               Recent Cultures (last 7 days):           Last 24 Hours Medication List:     Current Facility-Administered Medications:     amLODIPine (NORVASC) tablet 10 mg, Daily    aspirin (ECOTRIN LOW STRENGTH) EC tablet 81 mg, Daily    atorvastatin (LIPITOR) tablet 40 mg, Daily    cefTRIAXone (ROCEPHIN) 1,000 mg in dextrose 5 % 50 mL IVPB, Q24H, Last Rate: 1,000 mg (09/10/24 0553)    furosemide (LASIX) tablet 40 mg, Daily    heparin (porcine) subcutaneous injection 5,000 Units, Q8H CHRISTIANO  **AND** [COMPLETED] Platelet count, Once    insulin glargine (LANTUS) subcutaneous injection 15 Units 0.15 mL, HS    insulin lispro (HumALOG/ADMELOG) 100 units/mL subcutaneous injection 1-6 Units, HS    insulin lispro (HumALOG/ADMELOG) 100 units/mL subcutaneous injection 2-12 Units, TID AC **AND** Fingerstick Glucose (POCT), TID AC    lisinopril (ZESTRIL) tablet 5 mg, Daily    metoprolol succinate (TOPROL-XL) 24 hr tablet 50 mg, Daily    ondansetron (ZOFRAN) injection 4 mg, Q6H PRN    pantoprazole (PROTONIX) EC tablet 40 mg, Early Morning    Administrative Statements   Today, Patient Was Seen By: Adriana Sun      **Please Note: This note may have been constructed using a voice recognition system.**

## 2024-09-10 NOTE — ASSESSMENT & PLAN NOTE
present on admission, in patient with B/L pressure ulcers of feet and possible osteomyelitis, evidenced by leukocytosis (WBC 17.4), tachycardia (), tachypnea (RR 24), requiring treatment with IV Rocephin/Vancomycin and blood cultures.  Patient currently afebrile, WBC's trending down (9/9 10.33), VS stable

## 2024-09-10 NOTE — UTILIZATION REVIEW
Continued Stay Review    Date:   9/10/24                          Current Patient Class: Inpatient  Current Level of Care:  med surg    HPI:59 y.o. male initially admitted on   9/1     SURGERY DATE: 9/6/2024   Procedure(s):  (1) Right foot 3rd metatarsal resection (Incision of bone cortex for osteomyelitis)  (2) Right foot 4th digit amputation at [facility]  (3) Right foot 3rd digit amputation at [facility]TP[facility]  (4) Left foot hallux amputation at [facility]TP[facility]  (5) Right foot wound debridement through subcutaneous level    Assessment/Plan:   9/10   POD  #  4   Continue  PT/OT.     Dressings  intact both feet.   Denies pain.  Continue  YULY.      Vital Signs (last 3 days)       Date/Time Temp Pulse Resp BP MAP (mmHg) SpO2 O2 Device Patient Position - Orthostatic VS Vivian Coma Scale Score Pain    09/10/24 07:54:03 98.4 °F (36.9 °C) 62 -- 107/58 74 96 % -- -- -- --    09/10/24 07:52:34 98.4 °F (36.9 °C) 55 -- 107/58 74 96 % -- -- -- --    09/09/24 2300 -- -- -- -- -- -- -- -- 15 --    09/09/24 21:55:46 97.9 °F (36.6 °C) 61 15 106/55 72 94 % -- -- -- --    09/09/24 2000 -- -- -- -- -- -- -- -- -- 3    09/09/24 16:42:11 -- 66 -- 110/66 81 94 % -- -- -- --    09/09/24 14:53:45 98.5 °F (36.9 °C) 65 15 89/57 68 94 % -- -- -- --    09/09/24 0900 -- -- -- -- -- -- None (Room air) -- 15 No Pain    09/09/24 07:21:52 97.7 °F (36.5 °C) 66 18 126/74 91 93 % -- -- -- --    09/08/24 2100 -- -- -- -- -- -- -- -- 15 No Pain    09/08/24 15:26:20 99 °F (37.2 °C) 69 -- 115/64 81 95 % -- -- -- --    09/08/24 1002 -- -- -- -- -- -- -- -- -- No Pain    09/08/24 1001 -- -- -- -- -- -- -- -- -- No Pain    09/08/24 0957 -- -- -- -- -- 95 % None (Room air) -- 15 No Pain    09/08/24 0900 -- 64 -- -- -- 94 % -- -- -- --    09/08/24 07:28:23 98.3 °F (36.8 °C) 58 -- 109/59 76 97 % -- -- -- --    09/07/24 23:48:44 98.9 °F (37.2 °C) 62 18 104/58 73 95 % -- -- -- --    09/07/24 2000 -- -- -- -- -- 96 % None (Room air) -- 15 No Pain    09/07/24 15:53:26 98.5 °F (36.9 °C) 67  18 101/64 76 94 % None (Room air) Lying 15 No Pain    09/07/24 1500 -- -- 20 106/61 79 -- None (Room air) Lying -- No Pain    09/07/24 0800 -- -- -- -- -- -- -- -- 15 No Pain    09/07/24 0755 -- 71 -- 105/63 78 96 % -- -- -- --    09/07/24 0700 -- -- 18 105/63 78 -- None (Room air) Lying -- --          Weight (last 2 days)       Date/Time Weight    09/10/24 0600 98.1 (216.27)    09/09/24 0559 99.2 (218.7)    09/08/24 0600 95.4 (210.32)    09/08/24 0500 95.4 (210.32)     Weight: Everything removed from bed, at 09/08/24 0500              Pertinent Labs/Diagnostic Results:   Radiology:  XR foot 3+ vw right   Final Interpretation by Jose A Duran MD (09/06 1006)   Interval postsurgical changes.         Computerized Assisted Algorithm (CAA) may have been used to analyze all applicable images.         Workstation performed: [id number]         XR foot 3+ vw left   Final Interpretation by Jose A Duran MD (09/06 1006)      Interval amputation of the great toe.         Computerized Assisted Algorithm (CAA) may have been used to analyze all applicable images.         Workstation performed: [id number]         MRI foot/forefoot toes left w wo contrast   Final Interpretation by Dane Floyd MD (09/04 0751)   Plantar skin ulceration great toe with marrow change first distal phalanx consistent with osteomyelitis. Some marrow change also noted distal aspect of first proximal phalanx again suspicious for osteomyelitis and presumptive intervening intermetatarsal    septic arthropathy. Remaining marrow signal is preserved.      The study was marked in EPIC for immediate notification.         Workstation performed: [id number]         MRI foot/forefoot toes right w wo contrast   Final Interpretation by Dane Floyd MD (09/04 0754)      1. Third submetatarsal skin ulceration with marrow change involving the residual third metatarsal consistent with osteomyelitis as described.   2. T1 replacement signal throughout the  fourth proximal phalanx with corresponding increased T2 signal and post gadolinium enhancement, again consistent with osteomyelitis.   3. Postoperative resection of the second and third toes and first transmetatarsal amputation as described.      The study was marked in EPIC for immediate notification.         Workstation performed: [id number]         VAS NALDO and waveform analysis, multiple levels   Final Interpretation by Lacie Nixon MD (09/04 1412)      XR foot 2 vw right   Final Interpretation by Sotero Andrade MD (09/03 1005)         Erosions of the third phalanx concerning for osteomyelitis.      Equivocal erosions of the first and second phalanx could represent osteomyelitis. MRI may help differentiate this possibility.      Soft tissue ulceration adjacent to the third phalanx.      I have personally reviewed this study including all images.  / R.J.FEmmanuel            Resident: Rachele Matos I, the attending radiologist, have reviewed the images and agree with the final report above.      Workstation performed: [id number]         XR foot 2 vw left   Final Interpretation by Sotero Andrade MD (09/03 1000)      Soft tissue ulceration at the plantar aspect of the first toe without signs of osteomyelitis at this time.      I have personally reviewed this study including all images.  / R.J.F.         Resident: Rachele Matos I, the attending radiologist, have reviewed the images and agree with the final report above.      Workstation performed: [id number]         CTA ED chest PE Study   Final Interpretation by Justyn Chawla DO (09/01 2314)   Diffuse bilateral multifocal groundglass opacities throughout the lungs most consistent with multifocal infectious process. Early ARDS can have a similar appearance. Clinical correlation is advised   No central or segmental pulmonary embolus.                  Workstation performed: [id number]         XR chest 1 view portable   ED Interpretation by Nia LOPEZ  ROS Moreno (09/01 0406)   Mild pulmonary edema      Final Interpretation by Cedric Rivers MD (09/02 1924)      Mild pulmonary edema, new since the prior examination.            Workstation performed: [id number]           Cardiology:  Echo follow up/limited w/ contrast if indicated   Final Result by Mari Watts MD (09/03 1125)        Left Ventricle: Left ventricular cavity size is normal. The left    ventricular ejection fraction is 55%. Systolic function is normal. Wall    motion is normal.     This is a limited study to assess ejection fraction and wall motion    abnormality.         ECG 12 lead   Final Result by Tim Whitlock MD (09/02 8542)   Sinus bradycardia   Nonspecific ST and T wave abnormality   Abnormal ECG   When compared with ECG of 01-SEP-2024 03:47,   Vent. rate has decreased BY  67 BPM   ST no longer depressed in Lateral leads   Flat T waves now evident in Anterior leads   Confirmed by Tim Whitlock ([id number]) on 9/2/2024 5:42:24 PM      Echo complete w/ contrast if indicated   Final Result by Tim Whitlock MD (09/01 1555)        Left Ventricle: Left ventricular cavity size is normal. Wall thickness    is increased. There is mild to moderate concentric hypertrophy. LVEF is    likely 50-55% Wall motion cannot be accurately assessed. Diastolic    function is moderately abnormal, consistent with grade II (pseudonormal)    relaxation.     Left Atrium: The atrium is mildly dilated.     Right Atrium: The atrium is mildly dilated.     Aortic Valve: The leaflets are moderately thickened. The leaflets are    moderately calcified. There is moderately reduced mobility. There is    moderate to severe stenosis. LVOT VTI of 22, AV VTI of 84, DVI of 0.26,    mean gradient 29, max gradient of 53.95, with Valve area of 1.      Patient with borderline severe aortic stenosis         ECG 12 lead   Final Result by Tim Whitlock MD (09/01 1122)   Sinus tachycardia   Nonspecific ST and T wave abnormality    Abnormal ECG   When compared with ECG of 20-AUG-2024 07:43,   Vent. rate has increased BY  52 BPM   ST now depressed in Lateral leads   Inverted T waves have replaced nonspecific T wave abnormality in Lateral    leads   Confirmed by Tim Whitlock ([id number]) on 9/1/2024 5:25:08 PM              Results from last 7 days   Lab Units 09/09/24  0555 09/08/24  0504 09/07/24  0544 09/06/24  0508 09/05/24  0519   WBC Thousand/uL 10.33* 11.97* 12.91* 10.50* 10.03   HEMOGLOBIN g/dL 11.6* 11.3* 12.3 12.5 12.9   HEMATOCRIT % 36.0* 35.9* 38.5 40.7 40.7   PLATELETS Thousands/uL 224 219 265 295 305   TOTAL NEUT ABS Thousands/µL 6.86 8.20* 9.80*  --  6.02   BANDS PCT %  --   --   --  5  --          Results from last 7 days   Lab Units 09/09/24  0555 09/08/24  0504 09/07/24  0544 09/06/24  0508 09/05/24  0519   SODIUM mmol/L 136 135 135 136 136   POTASSIUM mmol/L 4.0 4.1 4.1 4.0 4.0   CHLORIDE mmol/L 103 102 104 103 102   CO2 mmol/L 28 28 26 27 30   ANION GAP mmol/L 5 5 5 6 4   BUN mg/dL 19 23 20 24 19   CREATININE mg/dL 0.96 1.16 0.83 1.06 0.83   EGFR ml/min/1.73sq m 87 69 97 77 97   CALCIUM mg/dL 8.8 8.7 8.6 8.6 8.8         Results from last 7 days   Lab Units 09/10/24  0748 09/09/24  2103 09/09/24  1556 09/09/24  1059 09/09/24  0720 09/08/24  2109 09/08/24  1626 09/08/24  1129 09/08/24  0727 09/07/24 2010 09/07/24  1507 09/07/24  1031   POC GLUCOSE mg/dl 149* 272* 165* 218* 122 264* 217* 180* 138 195* 142* 213*     Results from last 7 days   Lab Units 09/09/24  0555 09/08/24  0504 09/07/24  0544 09/06/24  0508 09/05/24  0519 09/04/24  0502   GLUCOSE RANDOM mg/dL 124 124 145* 225* 155* 207*           Results from last 7 days   Lab Units 09/07/24  0544 09/05/24  0946 09/04/24  0502   VANCOMYCIN RM ug/mL 13.4 18.9 12.4       Medications:   Scheduled Medications:  [START ON 9/11/2024] amLODIPine, 5 mg, Oral, Daily  aspirin, 81 mg, Oral, Daily  atorvastatin, 40 mg, Oral, Daily  cefTRIAXone, 1,000 mg, Intravenous, Q24H  furosemide,  40 mg, Oral, Daily  heparin (porcine), 5,000 Units, Subcutaneous, Q8H CHRISTIANO  insulin glargine, 18 Units, Subcutaneous, HS  insulin lispro, 1-6 Units, Subcutaneous, HS  insulin lispro, 2-12 Units, Subcutaneous, TID AC  lisinopril, 5 mg, Oral, Daily  metoprolol succinate, 50 mg, Oral, Daily  pantoprazole, 40 mg, Oral, Early Morning      Continuous IV Infusions:     PRN Meds:  ondansetron, 4 mg, Intravenous, Q6H PRN        Discharge Plan:    TBD    Network Utilization Review Department  ATTENTION: Please call with any questions or concerns to [phone] and carefully listen to the prompts so that you are directed to the right person. All voicemails are confidential.   For Discharge needs, contact Care Management DC Support Team at [phone] opt. 2  Send all requests for admission clinical reviews, approved or denied determinations and any other requests to dedicated fax number below belonging to the [place] where the patient is receiving treatment. List of dedicated fax numbers for the Facilities:  FACILITY NAME UR FAX NUMBER   ADMISSION DENIALS (Administrative/Medical Necessity) [phone]   DISCHARGE SUPPORT TEAM (NETWORK) [phone]   PARENT CHILD HEALTH (Maternity/NICU/Pediatrics) [phone]   [facility] [phone]   [facility] [phone]   [facility] [phone]   [facility] [phone]   [facility] [phone]   [facility] [phone]   [facility] [phone]   [facility] [phone]   [facility] [phone]   [facility] [phone]   [facility] [phone]   [facility] [phone]

## 2024-09-11 LAB
GLUCOSE SERPL-MCNC: 152 MG/DL (ref 65–140)
GLUCOSE SERPL-MCNC: 185 MG/DL (ref 65–140)
GLUCOSE SERPL-MCNC: 197 MG/DL (ref 65–140)
GLUCOSE SERPL-MCNC: 269 MG/DL (ref 65–140)

## 2024-09-11 PROCEDURE — 99233 SBSQ HOSP IP/OBS HIGH 50: CPT | Performed by: INTERNAL MEDICINE

## 2024-09-11 PROCEDURE — 82948 REAGENT STRIP/BLOOD GLUCOSE: CPT

## 2024-09-11 RX ADMIN — ATORVASTATIN CALCIUM 40 MG: 40 TABLET, FILM COATED ORAL at 10:03

## 2024-09-11 RX ADMIN — HEPARIN SODIUM 5000 UNITS: 5000 INJECTION INTRAVENOUS; SUBCUTANEOUS at 06:07

## 2024-09-11 RX ADMIN — METOPROLOL SUCCINATE 50 MG: 50 TABLET, EXTENDED RELEASE ORAL at 10:04

## 2024-09-11 RX ADMIN — INSULIN GLARGINE 18 UNITS: 100 INJECTION, SOLUTION SUBCUTANEOUS at 21:26

## 2024-09-11 RX ADMIN — FUROSEMIDE 40 MG: 40 TABLET ORAL at 10:03

## 2024-09-11 RX ADMIN — ASPIRIN 81 MG: 81 TABLET, COATED ORAL at 10:03

## 2024-09-11 RX ADMIN — AMLODIPINE BESYLATE 5 MG: 5 TABLET ORAL at 10:03

## 2024-09-11 RX ADMIN — INSULIN LISPRO 6 UNITS: 100 INJECTION, SOLUTION INTRAVENOUS; SUBCUTANEOUS at 17:17

## 2024-09-11 RX ADMIN — HEPARIN SODIUM 5000 UNITS: 5000 INJECTION INTRAVENOUS; SUBCUTANEOUS at 21:26

## 2024-09-11 RX ADMIN — LISINOPRIL 5 MG: 5 TABLET ORAL at 10:03

## 2024-09-11 RX ADMIN — CEFTRIAXONE SODIUM 1000 MG: 10 INJECTION, POWDER, FOR SOLUTION INTRAVENOUS at 06:07

## 2024-09-11 RX ADMIN — INSULIN LISPRO 2 UNITS: 100 INJECTION, SOLUTION INTRAVENOUS; SUBCUTANEOUS at 07:37

## 2024-09-11 RX ADMIN — INSULIN LISPRO 2 UNITS: 100 INJECTION, SOLUTION INTRAVENOUS; SUBCUTANEOUS at 11:59

## 2024-09-11 RX ADMIN — PANTOPRAZOLE SODIUM 40 MG: 40 TABLET, DELAYED RELEASE ORAL at 06:07

## 2024-09-11 RX ADMIN — INSULIN LISPRO 1 UNITS: 100 INJECTION, SOLUTION INTRAVENOUS; SUBCUTANEOUS at 21:26

## 2024-09-11 RX ADMIN — HEPARIN SODIUM 5000 UNITS: 5000 INJECTION INTRAVENOUS; SUBCUTANEOUS at 16:00

## 2024-09-11 NOTE — ASSESSMENT & PLAN NOTE
BP controlled  Continue Lasix, lisinopril, amlodipine, beta-blocker  Amlodipine decreased to 5 mg yesterday due to lower Bps. His blood pressures are looking better, currently 125/69

## 2024-09-11 NOTE — PROGRESS NOTES
Progress Note - Hospitalist   Name: Zian Gayle 59 y.o. male I MRN: [id number]  Unit/Bed#: -01 I Date of Admission: 9/1/2024   Date of Service: 9/11/2024 I Hospital Day: 10    Assessment & Plan  Acute respiratory failure with hypoxia (HCC)  Likely secondary to CHF exacerbation   CT negative for PE with overall appearance of volume overload  Required BIPAP for a short period of time   Since resolved now saturating adequately on room air. Patient denies any SOB, chest pain, cough.  Acute exacerbation of CHF (congestive heart failure) ([facility])  Wt Readings from Last 3 Encounters:   09/11/24 95.8 kg (211 lb 3.2 oz)   10/05/21 104 kg (229 lb)     Presented with acute onset shortness of breath  CT negative for PE with overall appearance of volume overload  Resolved with IV diuretics  Since been transitioned to oral Lasix 40 daily  Echo revealed normal EF  Patient currently appears euvolemic, no LL edema or JVD, SOB. Sat appropriate on RA  ODALYS (acute kidney injury) ([facility])  Baseline crt 1.1-1.25  Presenting with crt 1.57  Likely secondary to chf exacerbation   Now back to baseline continue to monitor  Aortic valve stenosis  Noted to have moderate aortic stenosis with fused valve on echocardiogram in 2023  Patient has been discussing valve replacement with his outpatient cardiology   Continue outpatient follow-up with cardiology  Elevated troponin  Trop >1200, suspect nonischemic myocardial injury but in setting of mod/severe AS may need to consider additional testing   Cardiology following  Echo ruled out ischemia  Currently without chest pain  No need for further evaluation at this time  Appreciate cardiology recommendations  Essential hypertension  BP controlled  Continue Lasix, lisinopril, amlodipine, beta-blocker  Amlodipine decreased to 5 mg yesterday due to lower Bps. His blood pressures are looking better, currently 125/69  Gastroesophageal reflux disease without esophagitis  Continue PPI  Type 2 diabetes mellitus  "with hyperglycemia (HCC)  Increased nightly Lantus to 18 units for better glycemic control  Sliding-scale insulin  Monitor blood glucose  Diabetic foot infection  (HCC)  Right foot ulcer initially caused by brown recluse spider resulting in osteomyelitis and right 3-digit amputation   Also with left plantar ulcer  High suspicion for osteomyelitis,  MRI of left foot revealed \"Plantar skin ulceration great toe with marrow change first distal phalanx consistent with osteomyelitis. Some marrow change also noted distal aspect of first proximal phalanx again suspicious for osteomyelitis and presumptive intervening intermetatarsal septic arthropathy\"   MRI of right foot \" Third submetatarsal skin ulceration with marrow change involving the residual third metatarsal consistent with osteomyelitis as described.  2. T1 replacement signal throughout the fourth proximal phalanx with corresponding increased T2 signal and post gadolinium enhancement, again consistent with osteomyelitis\"  Underwent OR intervention by podiatry on 9/6 left foot hallux amputation right foot third and fourth digit amputation  DC vancomycin  Will continue IV ceftriaxone  Anticipate transition to oral Keflex upon discharge to complete course  Will need home VNA/PT/OT. CM consult appreciated  Follow-up further podiatry recommendations, may need wound VAC?  Will discuss with podiatry. Patient worried about cost  Sepsis (HCC)  present on admission, in patient with B/L pressure ulcers of feet and possible osteomyelitis, evidenced by leukocytosis (WBC 17.4), tachycardia (), tachypnea (RR 24), requiring treatment with IV Rocephin/Vancomycin and blood cultures.  Patient currently afebrile, WBC's trending down (9/9 10.33), VS stable         VTE Pharmacologic Prophylaxis: VTE Score: 7 Moderate Risk (Score 3-4) - Pharmacological DVT Prophylaxis Ordered: heparin.    Mobility:   Basic Mobility Inpatient Raw Score: 18  -[facility] Goal: 6: Walk 10 steps or " more  JH-HLM Achieved: 3: Sit at edge of bed  JH-HLM Goal NOT achieved. Continue with multidisciplinary rounding and encourage appropriate mobility to improve upon JH-HLM goals.    Patient Centered Rounds: I spoke to patient at bedside during AM rounds   Discussions with Specialists or Other Care Team Provider: None    Education and Discussions with Family / Patient: Spoke with patient at bedside. He will update relevant family members    Current Length of Stay: 10 day(s)  Current Patient Status: Inpatient   Certification Statement: The patient will continue to require additional inpatient hospital stay due to IV antibiotics  Discharge Plan: Anticipate discharge tomorrow to home.    Code Status: Level 1 - Full Code    Subjective   I spoke to the patient this morning during AM rounds at bedside. He is sitting up in bed and doing well. Denies any SOB, orthopnea, or chest pain. No fevers, chills, n/v/bowel changes. Endorses occasional phantom pains in amputated toes on both feet. He is doing well and ready to go home tomorrow as he will have family at home to support him.    Objective     Vitals:   Temp (24hrs), Av.6 °F (37 °C), Min:98 °F (36.7 °C), Max:99.2 °F (37.3 °C)    Temp:  [98 °F (36.7 °C)-99.2 °F (37.3 °C)] 98 °F (36.7 °C)  HR:  [63-67] 66  Resp:  [18-19] 18  BP: (110-132)/(61-69) 125/69  SpO2:  [94 %-97 %] 96 %  Body mass index is 29.46 kg/m².     Input and Output Summary (last 24 hours):     Intake/Output Summary (Last 24 hours) at 2024 0942  Last data filed at 2024 0557  Gross per 24 hour   Intake 180 ml   Output 1450 ml   Net -1270 ml       Physical Exam  HENT:      Head: Normocephalic.      Nose: No congestion or rhinorrhea.      Mouth/Throat:      Mouth: Mucous membranes are moist.   Cardiovascular:      Rate and Rhythm: Normal rate and regular rhythm.      Pulses: Normal pulses.      Heart sounds: S1 normal and S2 normal.      Comments: No JVD  Pulmonary:      Effort: Pulmonary effort is  normal. No respiratory distress.      Breath sounds: Normal breath sounds. No wheezing.   Abdominal:      General: Bowel sounds are normal.      Palpations: Abdomen is soft.      Tenderness: There is no abdominal tenderness.   Musculoskeletal:      Right lower leg: No edema.      Left lower leg: No edema.      Comments: Both feet are in bandages. L foot bandage has minimal dried blood seeped out   Skin:     General: Skin is warm and dry.      Capillary Refill: Capillary refill takes less than 2 seconds.   Neurological:      General: No focal deficit present.      Mental Status: He is alert.   Psychiatric:         Mood and Affect: Mood normal.          Lines/Drains:  Lines/Drains/Airways       Active Status       None                            Lab Results: I have reviewed the following results:    Results from last 7 days   Lab Units 09/09/24  0555 09/07/24  0544 09/06/24  0508   WBC Thousand/uL 10.33*   < > 10.50*   HEMOGLOBIN g/dL 11.6*   < > 12.5   HEMATOCRIT % 36.0*   < > 40.7   PLATELETS Thousands/uL 224   < > 295   BANDS PCT %  --   --  5   SEGS PCT % 65   < >  --    LYMPHO PCT % 21   < > 28   MONO PCT % 8   < > 4   EOS PCT % 3   < > 6    < > = values in this interval not displayed.     Results from last 7 days   Lab Units 09/09/24  0555   SODIUM mmol/L 136   POTASSIUM mmol/L 4.0   CHLORIDE mmol/L 103   CO2 mmol/L 28   BUN mg/dL 19   CREATININE mg/dL 0.96   ANION GAP mmol/L 5   CALCIUM mg/dL 8.8   GLUCOSE RANDOM mg/dL 124         Results from last 7 days   Lab Units 09/11/24  0722 09/10/24  2125 09/10/24  1634 09/10/24  1120 09/10/24  0748 09/09/24  2103 09/09/24  1556 09/09/24  1059 09/09/24  0720 09/08/24  2109 09/08/24  1626 09/08/24  1129   POC GLUCOSE mg/dl 152* 274* 158* 241* 149* 272* 165* 218* 122 264* 217* 180*               Recent Cultures (last 7 days):         Imaging Review: No pertinent imaging studies reviewed.  Other Studies: No additional pertinent studies reviewed.    Last 24 Hours Medication  List:     Current Facility-Administered Medications:     amLODIPine (NORVASC) tablet 5 mg, Daily    aspirin (ECOTRIN LOW STRENGTH) EC tablet 81 mg, Daily    atorvastatin (LIPITOR) tablet 40 mg, Daily    cefTRIAXone (ROCEPHIN) 1,000 mg in dextrose 5 % 50 mL IVPB, Q24H, Last Rate: 1,000 mg (09/11/24 0607)    furosemide (LASIX) tablet 40 mg, Daily    heparin (porcine) subcutaneous injection 5,000 Units, Q8H CHRISTIANO **AND** [COMPLETED] Platelet count, Once    insulin glargine (LANTUS) subcutaneous injection 18 Units 0.18 mL, HS    insulin lispro (HumALOG/ADMELOG) 100 units/mL subcutaneous injection 1-6 Units, HS    insulin lispro (HumALOG/ADMELOG) 100 units/mL subcutaneous injection 2-12 Units, TID AC **AND** Fingerstick Glucose (POCT), TID AC    lisinopril (ZESTRIL) tablet 5 mg, Daily    metoprolol succinate (TOPROL-XL) 24 hr tablet 50 mg, Daily    ondansetron (ZOFRAN) injection 4 mg, Q6H PRN    pantoprazole (PROTONIX) EC tablet 40 mg, Early Morning    Administrative Statements   Today, Patient Was Seen By: Adriana Sun      **Please Note: This note may have been constructed using a voice recognition system.**

## 2024-09-11 NOTE — ASSESSMENT & PLAN NOTE
Wt Readings from Last 3 Encounters:   09/11/24 95.8 kg (211 lb 3.2 oz)   10/05/21 104 kg (229 lb)     Presented with acute onset shortness of breath  CT negative for PE with overall appearance of volume overload  Resolved with IV diuretics  Since been transitioned to oral Lasix 40 daily  Echo revealed normal EF  Patient currently appears euvolemic, no LL edema or JVD, SOB. Sat appropriate on RA

## 2024-09-11 NOTE — ASSESSMENT & PLAN NOTE
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 Some marrow change also noted distal aspect of first proximal phalanx again suspicious for osteomyelitis and presumptive intervening intermetatarsal septic arthropathy\"   MRI of right foot \" Third submetatarsal skin ulceration with marrow change involving the residual third metatarsal consistent with osteomyelitis as described.  2. T1 replacement signal throughout the fourth proximal phalanx with corresponding increased T2 signal and post gadolinium enhancement, again consistent with osteomyelitis\"  Underwent OR intervention by podiatry on 9/6 left foot hallux amputation right foot third and fourth digit amputation  DC vancomycin  Will continue IV ceftriaxone  Anticipate transition to oral Keflex upon discharge to complete course  Will need home VNA/PT/OT. CM consult appreciated  Follow-up further podiatry recommendations, may need wound VAC?  Will discuss with podiatry. Patient worried about cost  "

## 2024-09-11 NOTE — CASE MANAGEMENT
Case Management Discharge Planning Note    Patient name Zain Gayle  Location /-01 MRN [id number]  : 1965 Date 2024       Current Admission Date: 2024  Current Admission Diagnosis:Acute respiratory failure with hypoxia (HCC)   Patient Active Problem List    Diagnosis Date Noted Date Diagnosed    Sepsis (HCC) 2024     Diabetic foot infection  (HCC) 2024     Acute exacerbation of CHF (congestive heart failure) ([facility]) 10/15/2023     Acute respiratory failure with hypoxia ([facility]) 10/15/2023     ODALYS (acute kidney injury) ([facility]) 10/15/2023     Aortic valve stenosis 10/15/2023     CAD (coronary artery disease) 10/15/2023     Diabetic neuropathy associated with type 2 diabetes mellitus ([facility]) 10/15/2023     Essential hypertension 10/15/2023     Gastroesophageal reflux disease without esophagitis 10/15/2023     Type 2 diabetes mellitus with hyperglycemia ([facility]) 10/15/2023     Elevated troponin 01/15/2020       LOS (days): 10  Geometric Mean LOS (GMLOS) (days): 9.9  Days to GMLOS:-0.5     OBJECTIVE:  Risk of Unplanned Readmission Score: 12.88         Current admission status: Inpatient   Preferred Pharmacy:   [id number] [address]  [address]  [facility] [address]  Phone: [phone] Fax: [phone]    Primary Care Provider: No primary care provider on file.    Primary Insurance: [place] [employer] AND [facility]  Secondary Insurance:     DISCHARGE DETAILS:     Patient provided with [employer] connects, [place] [employer] disability form and [employer] medical assistance transportation form. Patient provided with PCP list. CM called patients insurance and was unable to get assistance to see if pt has a transportation benefit. Patient informed cm he called his insurance and he has no transport benefit.

## 2024-09-11 NOTE — DISCHARGE INSTR - OTHER ORDERS
Right plantar foot wound - Medihoney or pack with iodoform. Cover dorsal foot wounds with 4x4 gauze, ABD padding kerlex.  Left foot wound - 4x4 gauze, ABD padding, kerlex wrap.    Change every other day.

## 2024-09-11 NOTE — PLAN OF CARE
Problem: PAIN - ADULT  Goal: Verbalizes/displays adequate comfort level or baseline comfort level  Description: Interventions:  - Encourage patient to monitor pain and request assistance  - Assess pain using appropriate pain scale  - Administer analgesics based on type and severity of pain and evaluate response  - Implement non-pharmacological measures as appropriate and evaluate response  - Consider cultural and social influences on pain and pain management  - Notify physician/advanced practitioner if interventions unsuccessful or patient reports new pain  Outcome: Progressing     Problem: INFECTION - ADULT  Goal: Absence or prevention of progression during hospitalization  Description: INTERVENTIONS:  - Assess and monitor for signs and symptoms of infection  - Monitor lab/diagnostic results  - Monitor all insertion sites, i.e. indwelling lines, tubes, and drains  - Monitor endotracheal if appropriate and nasal secretions for changes in amount and color  - [place] appropriate cooling/warming therapies per order  - Administer medications as ordered  - Instruct and encourage patient and family to use good hand hygiene technique  - Identify and instruct in appropriate isolation precautions for identified infection/condition  Outcome: Progressing  Goal: Absence of fever/infection during neutropenic period  Description: INTERVENTIONS:  - Monitor WBC    Outcome: Progressing     Problem: SAFETY ADULT  Goal: Patient will remain free of falls  Description: INTERVENTIONS:  - Educate patient/family on patient safety including physical limitations  - Instruct patient to call for assistance with activity   - Consult OT/PT to assist with strengthening/mobility   - Keep Call bell within reach  - Keep bed low and locked with side rails adjusted as appropriate  - Keep care items and personal belongings within reach  - Initiate and maintain comfort rounds  - Make Fall Risk Sign visible to staff  - Offer Toileting  Hours, in  advance of need  - Initiate/Maintain alarm  - Obtain necessary fall risk management equipment  - Apply yellow socks and bracelet for high fall risk patients  - Consider moving patient to room near nurses station  Outcome: Progressing  Goal: Maintain or return to baseline ADL function  Description: INTERVENTIONS:  -  Assess patient's ability to carry out ADLs; assess patient's baseline for ADL function and identify physical deficits which impact ability to perform ADLs (bathing, care of mouth/teeth, toileting, grooming, dressing, etc.)  - Assess/evaluate cause of self-care deficits   - Assess range of motion  - Assess patient's mobility; develop plan if impaired  - Assess patient's need for assistive devices and provide as appropriate  - Encourage maximum independence but intervene and supervise when necessary  - Involve family in performance of ADLs  - Assess for home care needs following discharge   - Consider OT consult to assist with ADL evaluation and planning for discharge  - Provide patient education as appropriate  Outcome: Progressing  Goal: Maintains/Returns to pre admission functional level  Description: INTERVENTIONS:  - Perform AM-PAC 6 Click Basic Mobility/ Daily Activity assessment daily.  - Set and communicate daily mobility goal to care team and patient/family/caregiver.   - Collaborate with rehabilitation services on mobility goals if consulted  - Perform Range of Motion  - Reposition patient   - Dangle patient   - Stand patient   - Ambulate patient   - Out of bed to chair   - Out of bed for meals   - Out of bed for toileting  - Record patient progress and toleration of activity level   Outcome: Progressing     Problem: DISCHARGE PLANNING  Goal: Discharge to home or other facility with appropriate resources  Description: INTERVENTIONS:  - Identify barriers to discharge w/patient and caregiver  - Arrange for needed discharge resources and transportation as appropriate  - Identify discharge learning  needs (meds, wound care, etc.)  - Arrange for interpretive services to assist at discharge as needed  - Refer to Case Management Department for coordinating discharge planning if the patient needs post-hospital services based on physician/advanced practitioner order or complex needs related to functional status, cognitive ability, or social support system  Outcome: Progressing     Problem: Knowledge Deficit  Goal: Patient/family/caregiver demonstrates understanding of disease process, treatment plan, medications, and discharge instructions  Description: Complete learning assessment and assess knowledge base.  Interventions:  - Provide teaching at level of understanding  - Provide teaching via preferred learning methods  Outcome: Progressing     Problem: Nutrition/Hydration-ADULT  Goal: Nutrient/Hydration intake appropriate for improving, restoring or maintaining nutritional needs  Description: Monitor and assess patient's nutrition/hydration status for malnutrition. Collaborate with interdisciplinary team and initiate plan and interventions as ordered.  Monitor patient's weight and dietary intake as ordered or per policy. Utilize nutrition screening tool and intervene as necessary. Determine patient's food preferences and provide high-protein, high-caloric foods as appropriate.     INTERVENTIONS:  - Monitor oral intake, urinary output, labs, and treatment plans  - Assess nutrition and hydration status and recommend course of action  - Evaluate amount of meals eaten  - Assist patient with eating if necessary   - Allow adequate time for meals  - Recommend/ encourage appropriate diets, oral nutritional supplements, and vitamin/mineral supplements  - Order, calculate, and assess calorie counts as needed  - Recommend, monitor, and adjust tube feedings and TPN/PPN based on assessed needs  - Assess need for intravenous fluids  - Provide specific nutrition/hydration education as appropriate  - Include  patient/family/caregiver in decisions related to nutrition  Outcome: Progressing     Problem: Prexisting or High Potential for Compromised Skin Integrity  Goal: Skin integrity is maintained or improved  Description: INTERVENTIONS:  - Identify patients at risk for skin breakdown  - Assess and monitor skin integrity  - Assess and monitor nutrition and hydration status  - Monitor labs   - Assess for incontinence   - Turn and reposition patient  - Assist with mobility/ambulation  - Relieve pressure over bony prominences  - Avoid friction and shearing  - Provide appropriate hygiene as needed including keeping skin clean and dry  - Evaluate need for skin moisturizer/barrier cream  - Collaborate with interdisciplinary team   - Patient/family teaching  - Consider wound care consult   Outcome: Progressing

## 2024-09-11 NOTE — CASE MANAGEMENT
Case Management Discharge Planning Note    Patient name Zain Gayle  Location /-01 MRN [id number]  : 1965 Date 2024       Current Admission Date: 2024  Current Admission Diagnosis:Acute respiratory failure with hypoxia (HCC)   Patient Active Problem List    Diagnosis Date Noted Date Diagnosed    Sepsis (HCC) 2024     Diabetic foot infection  (HCC) 2024     Acute exacerbation of CHF (congestive heart failure) ([facility]) 10/15/2023     Acute respiratory failure with hypoxia ([facility]) 10/15/2023     ODALYS (acute kidney injury) ([facility]) 10/15/2023     Aortic valve stenosis 10/15/2023     CAD (coronary artery disease) 10/15/2023     Diabetic neuropathy associated with type 2 diabetes mellitus ([facility]) 10/15/2023     Essential hypertension 10/15/2023     Gastroesophageal reflux disease without esophagitis 10/15/2023     Type 2 diabetes mellitus with hyperglycemia ([facility]) 10/15/2023     Elevated troponin 01/15/2020       LOS (days): 10  Geometric Mean LOS (GMLOS) (days): 9.9  Days to GMLOS:-0.4     OBJECTIVE:  Risk of Unplanned Readmission Score: 12.85         Current admission status: Inpatient   Preferred Pharmacy:   [id number] - [place] - [address]  [address]  [facility] [address]  Phone: [phone] Fax: [phone]    Primary Care Provider: No primary care provider on file.    Primary Insurance: [place] [employer] AND [facility]  Secondary Insurance:     DISCHARGE DETAILS:    Requested Home Health Care         Is the patient interested in HHC at discharge?: Yes  Home Health Discipline requested:: Occupational Therapy, Physical Therapy, Nursing  Home Health Agency Name:: [facility]  HHA External Referral Reason (only applicable if external HHA name selected): Services not provided in network or near patient location  Home Health Follow-Up Provider:: PCP  Home Health Services Needed:: Evaluate Functional Status and  Safety, Strengthening/Theraputic Exercises to Improve Function, Gait/ADL Training, Post-Op Care and Assessment, Diabetes Management, Heart Failure Management, Wound/Ostomy Care  Homebound Criteria Met:: Requires the Assistance of Another Person for Safe Ambulation or to Leave the Home, Uses an Assist Device (i.e. cane, walker, etc)  Supporting Clincal Findings:: Limited Endurance, Fatigues Easliy in Short Distances         Other Referral/Resources/Interventions Provided:  Interventions: HHC  Referral Comments:  (per prior cm note, [facility] reserved in aidin)         Treatment Team Recommendation: Home with Home Health Care  Discharge Destination Plan:: Home with Home Health Care  Transport at Discharge : Free Local Transportation

## 2024-09-11 NOTE — PROGRESS NOTES
"Progress Note - Podiatric Surgery   Zain Gayle 59 y.o. male MRN: [id number]  Unit/Bed#: -01 Encounter: [id number]    Assessment:  59M diabetic s/p right foot partial 3rd ray resection, 4th & 5th digit amps, and left hallux amp (9/6/24).    Plan:  -Patient seen & examined at bedside this morning.  -POD#5 RIGHT FOOT PARTIAL 3RD RAY RESECTION, 4TH & 5TH DIGIT AMPS AND LEFT HALLUX AMP    -Recommend medihoney to the right plantar foot wound.    -Okay for D/C from my standpoint.     -Recommended nonweightbearing to the right foot, will allow weightbearing as tolerated to the left heel for transfers.  Please dispense bilateral postoperative shoes.    -There is no further podiatric surgical intervention anticipated during this patient's admission.  Will defer to medicine regarding antibiotic plans.      Subjective: Patient resting comfortably in bed.    Vitals: Blood pressure 127/71, pulse 65, temperature 98.8 °F (37.1 °C), resp. rate 18, height 5' 11\" (1.803 m), weight 95.8 kg (211 lb 3.2 oz), SpO2 96%.,Body mass index is 29.46 kg/m².      Intake/Output Summary (Last 24 hours) at 9/11/2024 1130  Last data filed at 9/11/2024 1113  Gross per 24 hour   Intake 180 ml   Output 1650 ml   Net -1470 ml       Invasive Devices       Peripheral Intravenous Line  Duration             Peripheral IV 09/10/24 Left;Ventral (anterior) Forearm 1 day                    Physical Exam:      Derm:  -dressings left intact, changed by nursing today.       Lab, Imaging and other studies: I have personally reviewed pertinent lab results.      Olivia Jones DPM          "

## 2024-09-12 VITALS
HEIGHT: 71 IN | BODY MASS INDEX: 29.57 KG/M2 | RESPIRATION RATE: 18 BRPM | SYSTOLIC BLOOD PRESSURE: 123 MMHG | DIASTOLIC BLOOD PRESSURE: 66 MMHG | OXYGEN SATURATION: 96 % | WEIGHT: 211.2 LBS | TEMPERATURE: 99 F | HEART RATE: 61 BPM

## 2024-09-12 LAB
GLUCOSE SERPL-MCNC: 148 MG/DL (ref 65–140)
GLUCOSE SERPL-MCNC: 174 MG/DL (ref 65–140)
GLUCOSE SERPL-MCNC: 229 MG/DL (ref 65–140)

## 2024-09-12 PROCEDURE — 88305 TISSUE EXAM BY PATHOLOGIST: CPT | Performed by: PATHOLOGY

## 2024-09-12 PROCEDURE — 88311 DECALCIFY TISSUE: CPT | Performed by: PATHOLOGY

## 2024-09-12 PROCEDURE — 99239 HOSP IP/OBS DSCHRG MGMT >30: CPT | Performed by: INTERNAL MEDICINE

## 2024-09-12 PROCEDURE — 82948 REAGENT STRIP/BLOOD GLUCOSE: CPT

## 2024-09-12 RX ORDER — CEFPODOXIME PROXETIL 200 MG/1
200 TABLET, FILM COATED ORAL 2 TIMES DAILY
Qty: 6 TABLET | Refills: 0 | Status: SHIPPED | OUTPATIENT
Start: 2024-09-12 | End: 2024-09-15

## 2024-09-12 RX ORDER — LISINOPRIL 5 MG/1
5 TABLET ORAL DAILY
Qty: 30 TABLET | Refills: 0 | Status: SHIPPED | OUTPATIENT
Start: 2024-09-13 | End: 2024-10-08

## 2024-09-12 RX ADMIN — INSULIN LISPRO 4 UNITS: 100 INJECTION, SOLUTION INTRAVENOUS; SUBCUTANEOUS at 13:28

## 2024-09-12 RX ADMIN — ATORVASTATIN CALCIUM 40 MG: 40 TABLET, FILM COATED ORAL at 08:40

## 2024-09-12 RX ADMIN — AMLODIPINE BESYLATE 5 MG: 5 TABLET ORAL at 08:43

## 2024-09-12 RX ADMIN — CEFTRIAXONE SODIUM 1000 MG: 10 INJECTION, POWDER, FOR SOLUTION INTRAVENOUS at 06:01

## 2024-09-12 RX ADMIN — PANTOPRAZOLE SODIUM 40 MG: 40 TABLET, DELAYED RELEASE ORAL at 06:01

## 2024-09-12 RX ADMIN — LISINOPRIL 5 MG: 5 TABLET ORAL at 08:40

## 2024-09-12 RX ADMIN — HEPARIN SODIUM 5000 UNITS: 5000 INJECTION INTRAVENOUS; SUBCUTANEOUS at 15:28

## 2024-09-12 RX ADMIN — METOPROLOL SUCCINATE 50 MG: 50 TABLET, EXTENDED RELEASE ORAL at 08:40

## 2024-09-12 RX ADMIN — FUROSEMIDE 40 MG: 40 TABLET ORAL at 08:40

## 2024-09-12 RX ADMIN — ASPIRIN 81 MG: 81 TABLET, COATED ORAL at 08:40

## 2024-09-12 RX ADMIN — INSULIN LISPRO 2 UNITS: 100 INJECTION, SOLUTION INTRAVENOUS; SUBCUTANEOUS at 08:47

## 2024-09-12 RX ADMIN — HEPARIN SODIUM 5000 UNITS: 5000 INJECTION INTRAVENOUS; SUBCUTANEOUS at 06:01

## 2024-09-12 NOTE — CASE MANAGEMENT
Case Management Discharge Planning Note    Patient name Zain Gayle  Location /-01 MRN [id number]  : 1965 Date 2024       Current Admission Date: 2024  Current Admission Diagnosis:Acute respiratory failure with hypoxia (HCC)   Patient Active Problem List    Diagnosis Date Noted Date Diagnosed    Sepsis (HCC) 2024     Diabetic foot infection  (HCC) 2024     Acute exacerbation of CHF (congestive heart failure) ([facility]) 10/15/2023     Acute respiratory failure with hypoxia ([facility]) 10/15/2023     ODALYS (acute kidney injury) ([facility]) 10/15/2023     Aortic valve stenosis 10/15/2023     CAD (coronary artery disease) 10/15/2023     Diabetic neuropathy associated with type 2 diabetes mellitus ([facility]) 10/15/2023     Essential hypertension 10/15/2023     Gastroesophageal reflux disease without esophagitis 10/15/2023     Type 2 diabetes mellitus with hyperglycemia ([facility]) 10/15/2023     Elevated troponin 01/15/2020       LOS (days): 11  Geometric Mean LOS (GMLOS) (days): 9.9  Days to GMLOS:-1.5     OBJECTIVE:  Risk of Unplanned Readmission Score: 11.86         Current admission status: Inpatient   [facility]erred Pharmacy:   [id number] [address]  [facility] [address]  Phone: [phone] Fax: [phone]    Primary Care Provider: No primary care provider on file.    Primary Insurance: [facility]  Secondary Insurance:     DISCHARGE DETAILS:     Signed abhijeet stephenson placed in medical records. Transport time is 4:30 ED entrance. Nurse informed on epic chat.     Fax received for refill of Amphetamine-Dextroamphetamine 30 mg Tables. Directions:  Take 1 tablet by mouth 2 times daily.    This is not on med list in chart.

## 2024-09-12 NOTE — DISCHARGE SUMMARY
Discharge Summary - Hospitalist   Name: Zain Gayle 59 y.o. male I MRN: [id number]  Unit/Bed#: -01 I Date of Admission: 9/1/2024   Date of Service: 9/12/2024 I Hospital Day: 11     Assessment & Plan  Acute respiratory failure with hypoxia (HCC)  Likely secondary to CHF exacerbation   CT negative for PE with overall appearance of volume overload  Required BIPAP for a short period of time   Since resolved now saturating adequately on room air. Patient denies any SOB, chest pain, cough.  Acute exacerbation of CHF (congestive heart failure) ([facility])  Wt Readings from Last 3 Encounters:   09/11/24 95.8 kg (211 lb 3.2 oz)   10/05/21 104 kg (229 lb)     Presented with acute onset shortness of breath  CT negative for PE with overall appearance of volume overload  Resolved with IV diuretics  Since been transitioned to oral Lasix 40 daily  Echo revealed normal EF  Patient currently appears euvolemic, no LL edema or JVD, SOB. Sat appropriate on RA  ODALYS (acute kidney injury) ([facility])  Baseline crt 1.1-1.25  Presenting with crt 1.57  Likely secondary to chf exacerbation   Now back to baseline continue to monitor  Aortic valve stenosis  Noted to have moderate aortic stenosis with fused valve on echocardiogram in 2023  Patient has been discussing valve replacement with his outpatient cardiology   Continue outpatient follow-up with cardiology  Elevated troponin  Trop >1200, suspect nonischemic myocardial injury but in setting of mod/severe AS may need to consider additional testing   Cardiology following  Echo ruled out ischemia  Currently without chest pain  No need for further evaluation at this time  Appreciate cardiology recommendations  Essential hypertension  BP controlled  Continue Lasix, lisinopril, amlodipine, beta-blocker  Amlodipine decreased to 5 mg yesterday due to lower Bps. 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 Some marrow change also noted distal aspect of first proximal phalanx again suspicious for osteomyelitis and presumptive intervening intermetatarsal septic arthropathy\"   MRI of right foot \" Third submetatarsal skin ulceration with marrow change involving the residual third metatarsal consistent with osteomyelitis as described.  2. T1 replacement signal throughout the fourth proximal phalanx with corresponding increased T2 signal and post gadolinium enhancement, again consistent with osteomyelitis\"  Underwent OR intervention by podiatry on 9/6 left foot hallux amputation right foot third and fourth digit amputation  DC vancomycin  Has had 11 days IV ceftriaxone. Will be discharged with 3 days of cefpodoxine to finish 2 week course  Will need home VNA/PT/OT. CM consult appreciated  Follow-up further podiatry recommendations, recommended wound WAC but cost is an issue  Sepsis (HCC)  present on admission, in patient with B/L pressure ulcers of feet and possible osteomyelitis, evidenced by leukocytosis (WBC 17.4), tachycardia (), tachypnea (RR 24), requiring treatment with IV Rocephin/Vancomycin and blood cultures.  Patient currently afebrile, WBC's trending down (9/9 10.33), VS stable          Medical Problems       Resolved Problems  Date Reviewed: 9/9/2024   None         Discharging Physician / Practitioner: Bradley Pritchett,*  PCP: No primary care provider on file.  Admission Date:   Admission Orders (From admission, " onward)       Ordered        09/01/24 0600  INPATIENT ADMISSION  Once                          Discharge Date: 09/12/24    Consultations During Hospital Stay:  IP CONSULT TO CASE MANAGEMENT  IP CONSULT TO PODIATRY  IP CONSULT TO CARDIOLOGY  IP CONSULT TO PHARMACY    Procedures Performed:   (1) Right foot 3rd metatarsal resection (Incision of bone cortex for osteomyelitis)  (2) Right foot 4th digit amputation at [facility]  (3) Right foot 3rd digit amputation at [facility]  (4) Left foot hallux amputation at [facility]  (5) Right foot wound debridement through subcutaneous level  On 9/6 by podiatrist Dr Kim team    Significant Findings / Test Results:   XR foot 3+ vw left    Result Date: 9/6/2024  Impression: Interval amputation of the great toe. Computerized Assisted Algorithm (CAA) may have been used to analyze all applicable images. Workstation performed: [id number]     XR foot 3+ vw right    Result Date: 9/6/2024  Impression: Interval postsurgical changes. Computerized Assisted Algorithm (CAA) may have been used to analyze all applicable images. Workstation performed: [id number]     MRI foot/forefoot toes right w wo contrast    Result Date: 9/4/2024  Impression: 1. Third submetatarsal skin ulceration with marrow change involving the residual third metatarsal consistent with osteomyelitis as described. 2. T1 replacement signal throughout the fourth proximal phalanx with corresponding increased T2 signal and post gadolinium enhancement, again consistent with osteomyelitis. 3. Postoperative resection of the second and third toes and first transmetatarsal amputation as described. The study was marked in EPIC for immediate notification. Workstation performed: [id number]     MRI foot/forefoot toes left w wo contrast    Result Date: 9/4/2024  Impression: Plantar skin ulceration great toe with marrow change first distal phalanx consistent with osteomyelitis. Some marrow change also noted distal aspect of first proximal phalanx again  suspicious for osteomyelitis and presumptive intervening intermetatarsal septic arthropathy. Remaining marrow signal is preserved. The study was marked in EPIC for immediate notification. Workstation performed: [id number]     XR foot 2 vw right    Result Date: 9/3/2024  Impression: Erosions of the third phalanx concerning for osteomyelitis. Equivocal erosions of the first and second phalanx could represent osteomyelitis. MRI may help differentiate this possibility. Soft tissue ulceration adjacent to the third phalanx. I have personally reviewed this study including all images.  / [facility] Resident: Rachele Matos I, the attending radiologist, have reviewed the images and agree with the final report above. Workstation performed: [id number]     XR foot 2 vw left    Result Date: 9/3/2024  Impression: Soft tissue ulceration at the plantar aspect of the first toe without signs of osteomyelitis at this time. I have personally reviewed this study including all images.  / [facility]IARA Resident: Rachele Matos I, the attending radiologist, have reviewed the images and agree with the final report above. Workstation performed: [id number]     XR chest 1 view portable    Result Date: 9/2/2024  Impression: Mild pulmonary edema, new since the prior examination. Workstation performed: [id number]     CTA ED chest PE Study    Result Date: 9/1/2024  Impression: Diffuse bilateral multifocal groundglass opacities throughout the lungs most consistent with multifocal infectious process. Early ARDS can have a similar appearance. Clinical correlation is advised No central or segmental pulmonary embolus. Workstation performed: [id number]       No Chest XR results available for this patient.   Results for orders placed during the hospital encounter of 09/01/24    Echo complete w/ contrast if indicated    Interpretation Summary    Left Ventricle: Left ventricular cavity size is normal. Wall thickness is increased. There is mild to moderate  "concentric hypertrophy. LVEF is likely 50-55% Wall motion cannot be accurately assessed. Diastolic function is moderately abnormal, consistent with grade II (pseudonormal) relaxation.    Left Atrium: The atrium is mildly dilated.    Right Atrium: The atrium is mildly dilated.    Aortic Valve: The leaflets are moderately thickened. The leaflets are moderately calcified. There is moderately reduced mobility. There is moderate to severe stenosis. LVOT VTI of 22, AV VTI of 84, DVI of 0.26, mean gradient 29, max gradient of 53.95, with Valve area of 1.    Patient with borderline severe aortic stenosis      No results for input(s): \"BLOODCX\", \"SPUTUMCULTUR\", \"GRAMSTAIN\", \"URINECX\", \"WOUNDCULT\", \"BODYFLUIDCUL\", \"MRSACULTURE\", \"INFLUAPCR\", \"INFLUBPCR\", \"RSVPCR\", \"LEGIONELLAUR\", \"STPU\", \"CDIFFTOXINB\" in the last 72 hours.    Incidental Findings:   None other than noted above;I reviewed the above mentioned incidental findings with the patient and/or family and they expressed understanding.    Test Results Pending at Discharge (will require follow up):   None     Outpatient Tests Requested:  Follow up with podiatry    Complications: None    Reason for Admission:   Chief Complaint   Patient presents with    Shortness of Breath     Pt. Arrives ED via ambulance for acute SOB with cough. Duoneb x2 and solumed 125mg. enroute. Home air sat was 88%         Hospital Course:   Zain Gayle is a 59 y.o. male patient with a PMH of CAD (s/p MARTHA 2022), HFpEF, HTN, T2DM, moderate AS, b/l foot ulcerations and Hx of MRSA infections, who originally presented to the hospital on 9/1/2024 due to acute onset SOB in the morning but no chest pain. In the ED he was placed on BIPAP, imaging c/f fluid overload vs infectious etiology with early ARDS and met sepsis criteria. Elevated troponins to 480 and , WBCs to 17.45 and ODALYS. Given Solumedrol/Duoneb/IV Lasix and admitted to the ICU. Cardiology was consulted and determined no acute coronary " "syndrome, troponins likely elevated due to chronic problems that are being followed OP. Diuresis prompted resolution of SOB and ODALYS. For infectious workup imaging of B/L feet confirmed osteomyelitis of R 1-3rd metatarsals and L halux, blood cultures negative. Podiatry consulted and debridement and amputation of L hallux and 1st-3rd R metatarsals ensued, patient is left with no toes on the R foot. Patient received IV vancomycin, eventually D/C and switched to IV ceftriaxone, completed 11 day course. Podiatry has been following, recommended wound VAC but patient worried about cost so this was not pursued, and hence cleared him for discharge. He is being discharged home and will have family there to provide assistance and care. He will have b/l postoperative shoes and allow weight-bearing as tolerated on the left foot only. Will be given 3 more days of abx to finish 14 day course. Condition at discharge is good.      Please see above list of diagnoses and related plan for additional information.     Condition at Discharge: good    Discharge Day Visit / Exam:   Subjective:  Spoke to patient during AM rounds at bedside. He denies any SOB, cough, cp, n/v/d, fevers, chills. He denies foot pain and is doing well.     Vitals: Blood Pressure: 123/71 (09/12/24 0731)  Pulse: 61 (09/12/24 0731)  Temperature: 97.7 °F (36.5 °C) (09/12/24 0731)  Temp Source: Oral (09/10/24 2327)  Respirations: 18 (09/12/24 0731)  Height: 5' 11\" (180.3 cm) (09/03/24 0700)  Weight - Scale: 95.8 kg (211 lb 3.2 oz) (09/11/24 0555)  SpO2: 95 % (09/12/24 0731)  Exam:   Physical Exam  Constitutional:       General: He is not in acute distress.  HENT:      Head: Normocephalic.      Nose: No congestion or rhinorrhea.      Mouth/Throat:      Mouth: Mucous membranes are moist.   Cardiovascular:      Rate and Rhythm: Normal rate and regular rhythm.      Heart sounds: S1 normal and S2 normal. No murmur heard.     No gallop.   Abdominal:      General: Bowel " sounds are normal. There is no distension.      Palpations: Abdomen is soft.   Musculoskeletal:      Right lower leg: No edema.      Left lower leg: No edema.      Comments: B/l feet are in new bandages   Skin:     Capillary Refill: Capillary refill takes less than 2 seconds.   Neurological:      General: No focal deficit present.      Mental Status: He is alert.   Psychiatric:         Mood and Affect: Mood normal.          Discussion with Family: Patient declined as he is in touch with his family    Discharge instructions/Information to patient and family:   See after visit summary for information provided to patient and family.      Provisions for Follow-Up Care:  See after visit summary for information related to follow-up care and any pertinent home health orders.       Mobility at time of Discharge:   Basic Mobility Inpatient Raw Score: 19  JH-HLM Goal: 6: Walk 10 steps or more  JH-HLM Achieved: 6: Walk 10 steps or more  HLM Goal NOT achieved. Continue to encourage mobility in post discharge setting.    Disposition:   Home    Planned Readmission: none     Discharge Statement:  The attending physician Bradley Pritchett,* spent 30+ minutes discharging the patient. This time was spent on the day of discharge. The attending physician had direct contact with the patient on the day of discharge. Greater than 50% of the total time was spent examining patient, answering all patient questions, arranging and discussing plan of care with patient as well as directly providing post-discharge instructions.  Additional time then spent on discharge activities.    Discharge Medications:  See after visit summary for reconciled discharge medications provided to patient and/or family.      **Please Note: This note may have been constructed using a voice recognition system**

## 2024-09-12 NOTE — PLAN OF CARE
Problem: PAIN - ADULT  Goal: Verbalizes/displays adequate comfort level or baseline comfort level  Description: Interventions:  - Encourage patient to monitor pain and request assistance  - Assess pain using appropriate pain scale  - Administer analgesics based on type and severity of pain and evaluate response  - Implement non-pharmacological measures as appropriate and evaluate response  - Consider cultural and social influences on pain and pain management  - Notify physician/advanced practitioner if interventions unsuccessful or patient reports new pain  9/12/2024 1154 by Chelsi Keita RN  Outcome: Progressing  9/12/2024 1154 by Chelsi Keita RN  Outcome: Progressing     Problem: INFECTION - ADULT  Goal: Absence or prevention of progression during hospitalization  Description: INTERVENTIONS:  - Assess and monitor for signs and symptoms of infection  - Monitor lab/diagnostic results  - Monitor all insertion sites, i.e. indwelling lines, tubes, and drains  - Monitor endotracheal if appropriate and nasal secretions for changes in amount and color  - [place] appropriate cooling/warming therapies per order  - Administer medications as ordered  - Instruct and encourage patient and family to use good hand hygiene technique  - Identify and instruct in appropriate isolation precautions for identified infection/condition  9/12/2024 1154 by Chelsi Keita RN  Outcome: Progressing  9/12/2024 1154 by Chelsi Keita RN  Outcome: Progressing  Goal: Absence of fever/infection during neutropenic period  Description: INTERVENTIONS:  - Monitor WBC    9/12/2024 1154 by Chelsi Keita RN  Outcome: Progressing  9/12/2024 1154 by Chelsi Keita RN  Outcome: Progressing     Problem: SAFETY ADULT  Goal: Patient will remain free of falls  Description: INTERVENTIONS:  - Educate patient/family on patient safety including physical limitations  - Instruct patient to call for assistance with  activity   - Consult OT/PT to assist with strengthening/mobility   - Keep Call bell within reach  - Keep bed low and locked with side rails adjusted as appropriate  - Keep care items and personal belongings within reach  - Initiate and maintain comfort rounds  - Make Fall Risk Sign visible to staff  - Apply yellow socks and bracelet for high fall risk patients  - Consider moving patient to room near nurses station  9/12/2024 1154 by Chelsi Keita RN  Outcome: Progressing  9/12/2024 1154 by Chelsi Keita RN  Outcome: Progressing  Goal: Maintain or return to baseline ADL function  Description: INTERVENTIONS:  -  Assess patient's ability to carry out ADLs; assess patient's baseline for ADL function and identify physical deficits which impact ability to perform ADLs (bathing, care of mouth/teeth, toileting, grooming, dressing, etc.)  - Assess/evaluate cause of self-care deficits   - Assess range of motion  - Assess patient's mobility; develop plan if impaired  - Assess patient's need for assistive devices and provide as appropriate  - Encourage maximum independence but intervene and supervise when necessary  - Involve family in performance of ADLs  - Assess for home care needs following discharge   - Consider OT consult to assist with ADL evaluation and planning for discharge  - Provide patient education as appropriate  9/12/2024 1154 by Chelsi Keita RN  Outcome: Progressing  9/12/2024 1154 by Chelsi Keita RN  Outcome: Progressing  Goal: Maintains/Returns to pre admission functional level  Description: INTERVENTIONS:  - Perform AM-PAC 6 Click Basic Mobility/ Daily Activity assessment daily.  - Set and communicate daily mobility goal to care team and patient/family/caregiver.   - Collaborate with rehabilitation services on mobility goals if consulted  - Out of bed for toileting  - Record patient progress and toleration of activity level   9/12/2024 1154 by Chelsi Keita  RN  Outcome: Progressing  9/12/2024 1154 by Chelsi Keita RN  Outcome: Progressing     Problem: DISCHARGE PLANNING  Goal: Discharge to home or other facility with appropriate resources  Description: INTERVENTIONS:  - Identify barriers to discharge w/patient and caregiver  - Arrange for needed discharge resources and transportation as appropriate  - Identify discharge learning needs (meds, wound care, etc.)  - Arrange for interpretive services to assist at discharge as needed  - Refer to Case Management Department for coordinating discharge planning if the patient needs post-hospital services based on physician/advanced practitioner order or complex needs related to functional status, cognitive ability, or social support system  9/12/2024 1154 by Chelsi Keita RN  Outcome: Progressing  9/12/2024 1154 by Chelsi Keita RN  Outcome: Progressing     Problem: Knowledge Deficit  Goal: Patient/family/caregiver demonstrates understanding of disease process, treatment plan, medications, and discharge instructions  Description: Complete learning assessment and assess knowledge base.  Interventions:  - Provide teaching at level of understanding  - Provide teaching via preferred learning methods  9/12/2024 1154 by Chelsi Keita RN  Outcome: Progressing  9/12/2024 1154 by Chelsi Keita RN  Outcome: Progressing     Problem: Nutrition/Hydration-ADULT  Goal: Nutrient/Hydration intake appropriate for improving, restoring or maintaining nutritional needs  Description: Monitor and assess patient's nutrition/hydration status for malnutrition. Collaborate with interdisciplinary team and initiate plan and interventions as ordered.  Monitor patient's weight and dietary intake as ordered or per policy. Utilize nutrition screening tool and intervene as necessary. Determine patient's food preferences and provide high-protein, high-caloric foods as appropriate.     INTERVENTIONS:  - Monitor oral intake,  urinary output, labs, and treatment plans  - Assess nutrition and hydration status and recommend course of action  - Evaluate amount of meals eaten  - Assist patient with eating if necessary   - Allow adequate time for meals  - Recommend/ encourage appropriate diets, oral nutritional supplements, and vitamin/mineral supplements  - Order, calculate, and assess calorie counts as needed  - Recommend, monitor, and adjust tube feedings and TPN/PPN based on assessed needs  - Assess need for intravenous fluids  - Provide specific nutrition/hydration education as appropriate  - Include patient/family/caregiver in decisions related to nutrition  9/12/2024 1154 by Chelsi Keita RN  Outcome: Progressing  9/12/2024 1154 by Chelsi Keita RN  Outcome: Progressing     Problem: Prexisting or High Potential for Compromised Skin Integrity  Goal: Skin integrity is maintained or improved  Description: INTERVENTIONS:  - Identify patients at risk for skin breakdown  - Assess and monitor skin integrity  - Assess and monitor nutrition and hydration status  - Monitor labs   - Assess for incontinence   - Turn and reposition patient  - Assist with mobility/ambulation  - Relieve pressure over bony prominences  - Avoid friction and shearing  - Provide appropriate hygiene as needed including keeping skin clean and dry  - Evaluate need for skin moisturizer/barrier cream  - Collaborate with interdisciplinary team   - Patient/family teaching  - Consider wound care consult   9/12/2024 1154 by Chelsi Keita RN  Outcome: Progressing  9/12/2024 1154 by Chelsi Keita RN  Outcome: Progressing

## 2024-09-12 NOTE — ASSESSMENT & PLAN NOTE
Wt Readings from Last 3 Encounters:   09/11/24 95.8 kg (211 lb 3.2 oz)   10/05/21 104 kg (229 lb)     Presented with acute onset shortness of breath  CT negative for PE with overall appearance of volume overload  Resolved with IV diuretics  Since been transitioned to oral Lasix 40 daily  Echo revealed normal EF  Patient currently appears euvolemic, no LL edema or JVD, SOB. Sat appropriate on RA   Stan Baptiste), Pediatrics  [address] [place] [address]  Phone: [phone]  Fax: [phone]

## 2024-09-12 NOTE — ASSESSMENT & PLAN NOTE
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 Some marrow change also noted distal aspect of first proximal phalanx again suspicious for osteomyelitis and presumptive intervening intermetatarsal septic arthropathy\"   MRI of right foot \" Third submetatarsal skin ulceration with marrow change involving the residual third metatarsal consistent with osteomyelitis as described.  2. T1 replacement signal throughout the fourth proximal phalanx with corresponding increased T2 signal and post gadolinium enhancement, again consistent with osteomyelitis\"  Underwent OR intervention by podiatry on 9/6 left foot hallux amputation right foot third and fourth digit amputation  DC vancomycin  Has had 11 days IV ceftriaxone. Will be discharged with 3 days of cefpodoxine to finish 2 week course  Will need home VNA/PT/OT. CM consult appreciated  Follow-up further podiatry recommendations, recommended wound WAC but cost is an issue  "

## 2024-09-12 NOTE — DISCHARGE INSTR - AVS FIRST PAGE
Dear Zain Gayle,     It was our pleasure to care for you here at [facility].  It is our hope that we were always able to exceed the expected standards for your care during your stay.  You were hospitalized due to shortness of breath and osteomyelitis.  You were cared for on the 3rd floor by Lillian Domingo DO under the service of Bradley Pritchett,* with the [facility] who covers for your primary care physician (PCP), No primary care provider on file., while you were hospitalized.  If you have any questions or concerns related to this hospitalization, you may contact us at .  For follow up as well as any medication refills, we recommend that you follow up with your primary care physician.  A registered nurse will reach out to you by phone within a few days after your discharge to answer any additional questions that you may have after going home.  However, at this time we provide for you here, the most important instructions / recommendations at discharge:     Notable Medication Adjustments -   Start taking cefpodoxime 200 mg twice daily for three days.  Start taking lisinopril 5 mg daily.  Testing Required after Discharge -   None  Important follow up information -   Please follow-up with your PCP in one week.  Please establish care and follow-up with podiatry. Daily wound care instructions will be provided and should be followed.  Other Instructions -   If you develop new or worsening symptoms of foot pain, drainage, redness, numbness, tingling, fever, and chills, contact your podiatrist for next steps.  Please review this entire after visit summary as additional general instructions including medication list, appointments, activity, diet, any pertinent wound care, and other additional recommendations from your care team that may be provided for you.      Sincerely,     Lillian Domingo DO

## 2024-09-26 ENCOUNTER — APPOINTMENT (OUTPATIENT)
Dept: MRI IMAGING | Facility: HOSPITAL | Age: 59
DRG: 638 | End: 2024-09-26
Payer: COMMERCIAL

## 2024-09-26 ENCOUNTER — APPOINTMENT (EMERGENCY)
Dept: RADIOLOGY | Facility: HOSPITAL | Age: 59
DRG: 638 | End: 2024-09-26
Payer: COMMERCIAL

## 2024-09-26 ENCOUNTER — HOSPITAL ENCOUNTER (INPATIENT)
Facility: HOSPITAL | Age: 59
DRG: 638 | End: 2024-09-26
Attending: EMERGENCY MEDICINE | Admitting: INTERNAL MEDICINE
Payer: COMMERCIAL

## 2024-09-26 ENCOUNTER — OFFICE VISIT (OUTPATIENT)
Dept: WOUND CARE | Facility: CLINIC | Age: 59
End: 2024-09-26
Payer: COMMERCIAL

## 2024-09-26 VITALS
DIASTOLIC BLOOD PRESSURE: 76 MMHG | BODY MASS INDEX: 28.58 KG/M2 | HEIGHT: 72 IN | TEMPERATURE: 98 F | RESPIRATION RATE: 18 BRPM | WEIGHT: 211 LBS | SYSTOLIC BLOOD PRESSURE: 120 MMHG | HEART RATE: 97 BPM

## 2024-09-26 DIAGNOSIS — L97.506 DIABETIC ULCER OF TOE ASSOCIATED WITH TYPE 2 DIABETES MELLITUS, WITH BONE INVOLVEMENT WITHOUT EVIDENCE OF NECROSIS, UNSPECIFIED LATERALITY (HCC): ICD-10-CM

## 2024-09-26 DIAGNOSIS — T81.30XA WOUND DEHISCENCE: ICD-10-CM

## 2024-09-26 DIAGNOSIS — T14.8XXA WOUND INFECTION: Primary | ICD-10-CM

## 2024-09-26 DIAGNOSIS — T81.89XA NON-HEALING SURGICAL WOUND, INITIAL ENCOUNTER: Primary | ICD-10-CM

## 2024-09-26 DIAGNOSIS — L08.9 WOUND INFECTION: Primary | ICD-10-CM

## 2024-09-26 DIAGNOSIS — E11.621 DIABETIC ULCER OF TOE ASSOCIATED WITH TYPE 2 DIABETES MELLITUS, WITH BONE INVOLVEMENT WITHOUT EVIDENCE OF NECROSIS, UNSPECIFIED LATERALITY (HCC): ICD-10-CM

## 2024-09-26 PROBLEM — I50.9 CHF (CONGESTIVE HEART FAILURE) (HCC): Status: ACTIVE | Noted: 2024-09-26

## 2024-09-26 PROBLEM — L97.509 DIABETIC FOOT ULCER (HCC): Status: ACTIVE | Noted: 2024-09-26

## 2024-09-26 LAB
ALBUMIN SERPL BCG-MCNC: 4.5 G/DL (ref 3.5–5)
ALP SERPL-CCNC: 110 U/L (ref 34–104)
ALT SERPL W P-5'-P-CCNC: 14 U/L (ref 7–52)
ANION GAP SERPL CALCULATED.3IONS-SCNC: 10 MMOL/L (ref 4–13)
APTT PPP: 35 SECONDS (ref 23–34)
AST SERPL W P-5'-P-CCNC: 12 U/L (ref 13–39)
BASOPHILS # BLD AUTO: 0.08 THOUSANDS/ÂΜL (ref 0–0.1)
BASOPHILS NFR BLD AUTO: 1 % (ref 0–1)
BILIRUB SERPL-MCNC: 0.76 MG/DL (ref 0.2–1)
BUN SERPL-MCNC: 39 MG/DL (ref 5–25)
CALCIUM SERPL-MCNC: 9.8 MG/DL (ref 8.4–10.2)
CHLORIDE SERPL-SCNC: 102 MMOL/L (ref 96–108)
CO2 SERPL-SCNC: 24 MMOL/L (ref 21–32)
CREAT SERPL-MCNC: 1.06 MG/DL (ref 0.6–1.3)
CRP SERPL QL: 14.3 MG/L
EOSINOPHIL # BLD AUTO: 0.15 THOUSAND/ÂΜL (ref 0–0.61)
EOSINOPHIL NFR BLD AUTO: 1 % (ref 0–6)
ERYTHROCYTE [DISTWIDTH] IN BLOOD BY AUTOMATED COUNT: 14.3 % (ref 11.6–15.1)
ERYTHROCYTE [SEDIMENTATION RATE] IN BLOOD: 76 MM/HOUR (ref 0–19)
GFR SERPL CREATININE-BSD FRML MDRD: 77 ML/MIN/1.73SQ M
GLUCOSE SERPL-MCNC: 233 MG/DL (ref 65–140)
GLUCOSE SERPL-MCNC: 260 MG/DL (ref 65–140)
GLUCOSE SERPL-MCNC: 301 MG/DL (ref 65–140)
HCT VFR BLD AUTO: 46 % (ref 36.5–49.3)
HGB BLD-MCNC: 14.8 G/DL (ref 12–17)
IMM GRANULOCYTES # BLD AUTO: 0.15 THOUSAND/UL (ref 0–0.2)
IMM GRANULOCYTES NFR BLD AUTO: 1 % (ref 0–2)
INR PPP: 1 (ref 0.85–1.19)
LACTATE SERPL-SCNC: 1.4 MMOL/L (ref 0.5–2)
LYMPHOCYTES # BLD AUTO: 1.9 THOUSANDS/ÂΜL (ref 0.6–4.47)
LYMPHOCYTES NFR BLD AUTO: 17 % (ref 14–44)
MCH RBC QN AUTO: 27 PG (ref 26.8–34.3)
MCHC RBC AUTO-ENTMCNC: 32.2 G/DL (ref 31.4–37.4)
MCV RBC AUTO: 84 FL (ref 82–98)
MONOCYTES # BLD AUTO: 0.55 THOUSAND/ÂΜL (ref 0.17–1.22)
MONOCYTES NFR BLD AUTO: 5 % (ref 4–12)
NEUTROPHILS # BLD AUTO: 8.71 THOUSANDS/ÂΜL (ref 1.85–7.62)
NEUTS SEG NFR BLD AUTO: 75 % (ref 43–75)
NRBC BLD AUTO-RTO: 0 /100 WBCS
PLATELET # BLD AUTO: 408 THOUSANDS/UL (ref 149–390)
PMV BLD AUTO: 10.5 FL (ref 8.9–12.7)
POTASSIUM SERPL-SCNC: 4.2 MMOL/L (ref 3.5–5.3)
PROCALCITONIN SERPL-MCNC: 0.07 NG/ML
PROT SERPL-MCNC: 8.9 G/DL (ref 6.4–8.4)
PROTHROMBIN TIME: 13.9 SECONDS (ref 12.3–15)
RBC # BLD AUTO: 5.49 MILLION/UL (ref 3.88–5.62)
SODIUM SERPL-SCNC: 136 MMOL/L (ref 135–147)
WBC # BLD AUTO: 11.54 THOUSAND/UL (ref 4.31–10.16)

## 2024-09-26 PROCEDURE — 80053 COMPREHEN METABOLIC PANEL: CPT

## 2024-09-26 PROCEDURE — A9585 GADOBUTROL INJECTION: HCPCS | Performed by: INTERNAL MEDICINE

## 2024-09-26 PROCEDURE — 85025 COMPLETE CBC W/AUTO DIFF WBC: CPT

## 2024-09-26 PROCEDURE — 99204 OFFICE O/P NEW MOD 45 MIN: CPT | Performed by: ORTHOPAEDIC SURGERY

## 2024-09-26 PROCEDURE — 99213 OFFICE O/P EST LOW 20 MIN: CPT | Performed by: ORTHOPAEDIC SURGERY

## 2024-09-26 PROCEDURE — 96375 TX/PRO/DX INJ NEW DRUG ADDON: CPT

## 2024-09-26 PROCEDURE — 99283 EMERGENCY DEPT VISIT LOW MDM: CPT

## 2024-09-26 PROCEDURE — 83605 ASSAY OF LACTIC ACID: CPT

## 2024-09-26 PROCEDURE — 11045 DBRDMT SUBQ TISS EACH ADDL: CPT | Performed by: ORTHOPAEDIC SURGERY

## 2024-09-26 PROCEDURE — 85652 RBC SED RATE AUTOMATED: CPT

## 2024-09-26 PROCEDURE — 99285 EMERGENCY DEPT VISIT HI MDM: CPT

## 2024-09-26 PROCEDURE — 85730 THROMBOPLASTIN TIME PARTIAL: CPT

## 2024-09-26 PROCEDURE — 73630 X-RAY EXAM OF FOOT: CPT

## 2024-09-26 PROCEDURE — 82948 REAGENT STRIP/BLOOD GLUCOSE: CPT

## 2024-09-26 PROCEDURE — 84145 PROCALCITONIN (PCT): CPT

## 2024-09-26 PROCEDURE — 11042 DBRDMT SUBQ TIS 1ST 20SQCM/<: CPT | Performed by: ORTHOPAEDIC SURGERY

## 2024-09-26 PROCEDURE — 36415 COLL VENOUS BLD VENIPUNCTURE: CPT

## 2024-09-26 PROCEDURE — 96365 THER/PROPH/DIAG IV INF INIT: CPT

## 2024-09-26 PROCEDURE — 99223 1ST HOSP IP/OBS HIGH 75: CPT

## 2024-09-26 PROCEDURE — 73720 MRI LWR EXTREMITY W/O&W/DYE: CPT

## 2024-09-26 PROCEDURE — 86140 C-REACTIVE PROTEIN: CPT

## 2024-09-26 PROCEDURE — 87040 BLOOD CULTURE FOR BACTERIA: CPT

## 2024-09-26 PROCEDURE — 85610 PROTHROMBIN TIME: CPT

## 2024-09-26 RX ORDER — GADOBUTROL 604.72 MG/ML
9 INJECTION INTRAVENOUS
Status: COMPLETED | OUTPATIENT
Start: 2024-09-26 | End: 2024-09-26

## 2024-09-26 RX ORDER — CLOPIDOGREL BISULFATE 75 MG/1
75 TABLET ORAL DAILY
Status: DISCONTINUED | OUTPATIENT
Start: 2024-09-27 | End: 2024-10-01 | Stop reason: HOSPADM

## 2024-09-26 RX ORDER — METOPROLOL SUCCINATE 50 MG/1
50 TABLET, EXTENDED RELEASE ORAL DAILY
Status: DISCONTINUED | OUTPATIENT
Start: 2024-09-27 | End: 2024-10-01 | Stop reason: HOSPADM

## 2024-09-26 RX ORDER — INSULIN LISPRO 100 [IU]/ML
1-6 INJECTION, SOLUTION INTRAVENOUS; SUBCUTANEOUS
Status: DISCONTINUED | OUTPATIENT
Start: 2024-09-26 | End: 2024-10-01 | Stop reason: HOSPADM

## 2024-09-26 RX ORDER — LIDOCAINE 40 MG/G
CREAM TOPICAL ONCE
Status: COMPLETED | OUTPATIENT
Start: 2024-09-26 | End: 2024-09-26

## 2024-09-26 RX ORDER — HEPARIN SODIUM 5000 [USP'U]/ML
5000 INJECTION, SOLUTION INTRAVENOUS; SUBCUTANEOUS EVERY 8 HOURS SCHEDULED
Status: COMPLETED | OUTPATIENT
Start: 2024-09-26 | End: 2024-09-26

## 2024-09-26 RX ORDER — VANCOMYCIN HYDROCHLORIDE 1 G/200ML
1000 INJECTION, SOLUTION INTRAVENOUS EVERY 12 HOURS
Status: DISCONTINUED | OUTPATIENT
Start: 2024-09-26 | End: 2024-09-26

## 2024-09-26 RX ORDER — ONDANSETRON 2 MG/ML
4 INJECTION INTRAMUSCULAR; INTRAVENOUS ONCE
Status: COMPLETED | OUTPATIENT
Start: 2024-09-26 | End: 2024-09-26

## 2024-09-26 RX ORDER — HEPARIN SODIUM 5000 [USP'U]/ML
5000 INJECTION, SOLUTION INTRAVENOUS; SUBCUTANEOUS EVERY 8 HOURS SCHEDULED
Status: DISCONTINUED | OUTPATIENT
Start: 2024-09-26 | End: 2024-09-26

## 2024-09-26 RX ORDER — ATORVASTATIN CALCIUM 40 MG/1
40 TABLET, FILM COATED ORAL DAILY
Status: DISCONTINUED | OUTPATIENT
Start: 2024-09-26 | End: 2024-10-01 | Stop reason: HOSPADM

## 2024-09-26 RX ORDER — ONDANSETRON 2 MG/ML
INJECTION INTRAMUSCULAR; INTRAVENOUS
Status: COMPLETED
Start: 2024-09-26 | End: 2024-09-26

## 2024-09-26 RX ORDER — ONDANSETRON 2 MG/ML
4 INJECTION INTRAMUSCULAR; INTRAVENOUS EVERY 6 HOURS PRN
Status: DISCONTINUED | OUTPATIENT
Start: 2024-09-26 | End: 2024-10-01 | Stop reason: HOSPADM

## 2024-09-26 RX ORDER — INSULIN GLARGINE 100 [IU]/ML
18 INJECTION, SOLUTION SUBCUTANEOUS
Status: DISCONTINUED | OUTPATIENT
Start: 2024-09-26 | End: 2024-10-01 | Stop reason: HOSPADM

## 2024-09-26 RX ORDER — FUROSEMIDE 40 MG/1
40 TABLET ORAL DAILY
Status: DISCONTINUED | OUTPATIENT
Start: 2024-09-26 | End: 2024-10-01 | Stop reason: HOSPADM

## 2024-09-26 RX ORDER — ENOXAPARIN SODIUM 100 MG/ML
40 INJECTION SUBCUTANEOUS DAILY
Status: DISCONTINUED | OUTPATIENT
Start: 2024-09-26 | End: 2024-09-26

## 2024-09-26 RX ADMIN — ONDANSETRON 4 MG: 2 INJECTION INTRAMUSCULAR; INTRAVENOUS at 12:43

## 2024-09-26 RX ADMIN — SODIUM CHLORIDE 1000 ML: 0.9 INJECTION, SOLUTION INTRAVENOUS at 13:10

## 2024-09-26 RX ADMIN — INSULIN LISPRO 4 UNITS: 100 INJECTION, SOLUTION INTRAVENOUS; SUBCUTANEOUS at 17:46

## 2024-09-26 RX ADMIN — HEPARIN SODIUM 5000 UNITS: 5000 INJECTION INTRAVENOUS; SUBCUTANEOUS at 22:45

## 2024-09-26 RX ADMIN — CEFTRIAXONE SODIUM 2000 MG: 10 INJECTION, POWDER, FOR SOLUTION INTRAVENOUS at 12:01

## 2024-09-26 RX ADMIN — LIDOCAINE: 40 CREAM TOPICAL at 10:19

## 2024-09-26 RX ADMIN — GADOBUTROL 9 ML: 604.72 INJECTION INTRAVENOUS at 18:52

## 2024-09-26 RX ADMIN — INSULIN GLARGINE 18 UNITS: 100 INJECTION, SOLUTION SUBCUTANEOUS at 22:45

## 2024-09-26 RX ADMIN — VANCOMYCIN HYDROCHLORIDE 2000 MG: 1 INJECTION, POWDER, LYOPHILIZED, FOR SOLUTION INTRAVENOUS at 12:16

## 2024-09-26 NOTE — ASSESSMENT & PLAN NOTE
Patient euvolemic on exam  Most recent echo September 2024 revealing LVEF 55%, no wall motion abnormality, nodiastolic dysfunction  Continue PTA Lasix  Wt Readings from Last 3 Encounters:   09/26/24 95.7 kg (211 lb)   09/11/24 95.8 kg (211 lb 3.2 oz)   10/05/21 104 kg (229 lb)

## 2024-09-26 NOTE — PATIENT INSTRUCTIONS
Orders Placed This Encounter   Procedures    Wound cleansing and dressings Surgical Right Plantar     Right Foot Wounds    Wash your hands with soap and water.  Remove old dressing, discard into plastic bag and place in trash.  Cleanse the wound with 5 minute soak of Dakins, after 5 minutes remove wet gauze prior to applying a clean dressing. Do not use tissue or cotton balls. Do not scrub the wound. Pat dry using gauze.    Shower no     Apply opticel Ag or silver alginate  to the right foot wound.  Cover with gauze  Secure with Jerome and tape  Change dressing daily    Dakins called into [facility] pharmacy at [place]    Need to follow up with your surgeon for weight bearing status and clearances     Standing Status:   Future     Standing Expiration Date:   10/3/2024    Wound Procedure Treatment Surgical Right Plantar     This order was created via procedure documentation    Wound Procedure Treatment Surgical Left Foot     This order was created via procedure documentation    Wound cleansing and dressings Surgical Left Foot     Left Foot Wound    Wash your hands with soap and water.  Remove old dressing, discard into plastic bag and place in trash.  Cleanse the wound with Dakins prior to applying a clean dressing. Do not use tissue or cotton balls. Do not scrub the wound. Pat dry using gauze.    Shower no     Apply Calmoseptine to skin surrounding wound  Apply Dakins wet to dry to the left foot wound.  Cover with gauze and or ABD pad  Secure with Jerome and tape  Change dressing daily    Continue home care for wound care and teaching    Sent to ER for exposed bone wound dehiscence     Standing Status:   Future     Standing Expiration Date:   10/3/2024

## 2024-09-26 NOTE — ASSESSMENT & PLAN NOTE
/76 POA, currently soft with BP in 80s systolic  Hold PTA amlodipine in setting of soft BP  Continue to monitor BP

## 2024-09-26 NOTE — ASSESSMENT & PLAN NOTE
"Most recent A1c 9.8 indicating poor glycemic control  Hold PTA Trulicity  18 units Lantus nightly and SSI ordered  Monitor Accu-Cheks closely  Avoid hypoglycemia  Lab Results   Component Value Date    HGBA1C 9.8 (H) 09/01/2024       No results for input(s): \"POCGLU\" in the last 72 hours.    Blood Sugar Average: Last 72 hrs:      "

## 2024-09-26 NOTE — ASSESSMENT & PLAN NOTE
Patient with history of CAD s/p stents x 2  Patient currently without chest pain or shortness of breath  Continue PTA statin  Hold PTA ASA and Plavix in setting of surgery tomorrow

## 2024-09-26 NOTE — PROGRESS NOTES
Wound Procedure Treatment Surgical Left Foot    Performed by: Keira Diaz RN  Authorized by: Beatrice Griffin PA-C    Associated wounds:   Wound 09/26/24 Surgical Foot Left  Wound cleansed with:  Dakin's 0.25%  Applied secondary dressing:  Gauze and ABD  Dressing secured with:  Jerome and Tape

## 2024-09-26 NOTE — PROGRESS NOTES
Consultation - Podiatric Surgery    Zain Gayle 59 y.o. male MRN: [id number]  Unit/Bed#: Z1H4 Encounter: [id number]      Assessment & Plan     Assessment:  59-year-old male diabetic presenting with chronic left foot post surgical wound, probes to bone.  Plan:  -Patient seen and examined in the ED, in process of being admitted.  -Left foot evaluated, probes to bone. There is a likelihood of bone infection.   -LEFT foot MRI pending for surgical planning.  -Case requested for tomorrow at 11:00 for LEFT foot partial 1st ray resection.  -Spoke to the patient regarding post op care as well. Patient will be following with me in our private practice clinic while wounds heal to B/L feet. Patient is in agreement of this plan today.  -Patient will be NPO after midnight tonight.    Thank you for this consultation.    History of Present Illness   Physician Requesting Consult: Bradley Pritchett,*  Reason for Consult / Principal Problem: Left foot wound  HPI: Zain Gayle is a 59 y.o. year old male who presents with Chronic left foot wound.  Patient underwent left great toe amputation by myself in the beginning of September.  Patient's left foot surgical wound had dehisced and stitches were removed at that point.  Patient has been compliant with postop shoe.  Patient went to wound care center this morning and due to probe ability to bone was sent to the hospital.    Consults    Review of Systems    Historical Information   Past Medical History:   Diagnosis Date    Allergic     Arthritis     CHF (congestive heart failure) (HCC)     Coronary artery disease     Diabetes mellitus (HCC)     Diabetic foot ulcers (HCC)     Hypertension     Moderate aortic stenosis     MRSA exposure      Past Surgical History:   Procedure Laterality Date    CARPAL TUNNEL RELEASE      KNEE SURGERY      AL AMPUTATION METATARSAL W/TOE SINGLE Bilateral 9/6/2024    Procedure: RIGHT FOOT PARTIAL 3RD RAY RESECTION, 4TH AND 5TH DIGIT AMPUTATION, LEFT  HALLUX AMPUTATION;  Surgeon: Robb Kim DPM;  Location: [facility];  Service: Podiatry    TOE AMPUTATION       Social History   Social History     Substance and Sexual Activity   Alcohol Use Not Currently     Social History     Substance and Sexual Activity   Drug Use Not on file     E-Cigarette/Vaping    E-Cigarette Use Never User      E-Cigarette/Vaping Substances     Social History     Tobacco Use   Smoking Status Former   Smokeless Tobacco Never     Family History: Family history non-contributory    Meds/Allergies   all current active meds have been reviewed  No Known Allergies    Objective   Vitals: Blood pressure 140/85, pulse 87, temperature 97.5 °F (36.4 °C), temperature source Temporal, resp. rate 14, SpO2 96%.,There is no height or weight on file to calculate BMI.      Intake/Output Summary (Last 24 hours) at 9/26/2024 1443  Last data filed at 9/26/2024 1426  Gross per 24 hour   Intake 1270.83 ml   Output --   Net 1270.83 ml     I/O last 24 hours:  In: 1270.8 [IV Piggyback:1270.8]  Out: -     Invasive Devices       Peripheral Intravenous Line  Duration             Peripheral IV 09/26/24 Distal;Right;Upper;Ventral (anterior) Arm <1 day                    Physical Exam  Ortho Exam    Vascular:   -DP pulse is palpable B/L, PT pulse is palpable B/L   -Capillary refill time is less than 3 seconds B/L  -Pedal hair growth is diminished B/L  -Temperature is warm to cool from ankle to toes B/L   -There is mild edema noted to the B/L LE    Neuro:  -Light touch and protective sensation is diminished    Ortho:  -Left hallux amputation.  -Right foot digital amputations 1-5.    Derm:  -LEFT foot hallux amputation surgical site wound open, probes deep to bone. NO purulent drainage. There is periwound erythema/calor. No malodor.      Lab Results:   Results from last 7 days   Lab Units 09/26/24  1155   WBC Thousand/uL 11.54*   HEMOGLOBIN g/dL 14.8   HEMATOCRIT % 46.0   PLATELETS Thousands/uL 408*   SEGS PCT % 75    LYMPHO PCT % 17   MONO PCT % 5   EOS PCT % 1       Imaging Studies: Reviewed radiology reports from this admission including: xray(s).  EKG, Pathology, and Other Studies: No pertinent imaging studies reviewed.    Code Status: Level 1 - Full Code  Advance Directive and Living Will:      Power of :    POLST:

## 2024-09-26 NOTE — PLAN OF CARE
Problem: PAIN - ADULT  Goal: Verbalizes/displays adequate comfort level or baseline comfort level  Description: Interventions:  - Encourage patient to monitor pain and request assistance  - Assess pain using appropriate pain scale  - Administer analgesics based on type and severity of pain and evaluate response  - Implement non-pharmacological measures as appropriate and evaluate response  - Consider cultural and social influences on pain and pain management  - Notify physician/advanced practitioner if interventions unsuccessful or patient reports new pain  Outcome: Progressing     Problem: INFECTION - ADULT  Goal: Absence or prevention of progression during hospitalization  Description: INTERVENTIONS:  - Assess and monitor for signs and symptoms of infection  - Monitor lab/diagnostic results  - Monitor all insertion sites, i.e. indwelling lines, tubes, and drains  - Monitor endotracheal if appropriate and nasal secretions for changes in amount and color  - Rampart appropriate cooling/warming therapies per order  - Administer medications as ordered  - Instruct and encourage patient and family to use good hand hygiene technique  - Identify and instruct in appropriate isolation precautions for identified infection/condition  Outcome: Progressing  Goal: Absence of fever/infection during neutropenic period  Description: INTERVENTIONS:  - Monitor WBC    Outcome: Progressing     Problem: SAFETY ADULT  Goal: Patient will remain free of falls  Description: INTERVENTIONS:  - Educate patient/family on patient safety including physical limitations  - Instruct patient to call for assistance with activity   - Consult OT/PT to assist with strengthening/mobility   - Keep Call bell within reach  - Keep bed low and locked with side rails adjusted as appropriate  - Keep care items and personal belongings within reach  - Initiate and maintain comfort rounds  - Make Fall Risk Sign visible to staff  - Offer Toileting every 2 Hours,  in advance of need  - Initiate/Maintain 2alarm  - Obtain necessary fall risk management equipment: 2  - Apply yellow socks and bracelet for high fall risk patients  - Consider moving patient to room near nurses station  Outcome: Progressing  Goal: Maintain or return to baseline ADL function  Description: INTERVENTIONS:  -  Assess patient's ability to carry out ADLs; assess patient's baseline for ADL function and identify physical deficits which impact ability to perform ADLs (bathing, care of mouth/teeth, toileting, grooming, dressing, etc.)  - Assess/evaluate cause of self-care deficits   - Assess range of motion  - Assess patient's mobility; develop plan if impaired  - Assess patient's need for assistive devices and provide as appropriate  - Encourage maximum independence but intervene and supervise when necessary  - Involve family in performance of ADLs  - Assess for home care needs following discharge   - Consider OT consult to assist with ADL evaluation and planning for discharge  - Provide patient education as appropriate  Outcome: Progressing  Goal: Maintains/Returns to pre admission functional level  Description: INTERVENTIONS:  - Perform AM-PAC 6 Click Basic Mobility/ Daily Activity assessment daily.  - Set and communicate daily mobility goal to care team and patient/family/caregiver.   - Collaborate with rehabilitation services on mobility goals if consulted  - Perform Range of Motion 2 times a day.  - Reposition patient every 2 hours.  - Dangle patient 2 times a day  - Stand patient 2 times a day  - Ambulate patient 2 times a day  - Out of bed to chair 2 times a day   - Out of bed for meals 2 times a day  - Out of bed for toileting  - Record patient progress and toleration of activity level   Outcome: Progressing     Problem: DISCHARGE PLANNING  Goal: Discharge to home or other facility with appropriate resources  Description: INTERVENTIONS:  - Identify barriers to discharge w/patient and caregiver  -  Arrange for needed discharge resources and transportation as appropriate  - Identify discharge learning needs (meds, wound care, etc.)  - Arrange for interpretive services to assist at discharge as needed  - Refer to Case Management Department for coordinating discharge planning if the patient needs post-hospital services based on physician/advanced practitioner order or complex needs related to functional status, cognitive ability, or social support system  Outcome: Progressing     Problem: Knowledge Deficit  Goal: Patient/family/caregiver demonstrates understanding of disease process, treatment plan, medications, and discharge instructions  Description: Complete learning assessment and assess knowledge base.  Interventions:  - Provide teaching at level of understanding  - Provide teaching via preferred learning methods  Outcome: Progressing     Problem: GASTROINTESTINAL - ADULT  Goal: Maintains or returns to baseline bowel function  Description: INTERVENTIONS:  - Assess bowel function  - Encourage oral fluids to ensure adequate hydration  - Administer IV fluids if ordered to ensure adequate hydration  - Administer ordered medications as needed  - Encourage mobilization and activity  - Consider nutritional services referral to assist patient with adequate nutrition and appropriate food choices  Outcome: Progressing     Problem: GENITOURINARY - ADULT  Goal: Absence of urinary retention  Description: INTERVENTIONS:  - Assess patient’s ability to void and empty bladder  - Monitor I/O  - Bladder scan as needed  - Discuss with physician/AP medications to alleviate retention as needed  - Discuss catheterization for long term situations as appropriate  Outcome: Progressing     Problem: METABOLIC, FLUID AND ELECTROLYTES - ADULT  Goal: Glucose maintained within target range  Description: INTERVENTIONS:  - Monitor Blood Glucose as ordered  - Assess for signs and symptoms of hyperglycemia and hypoglycemia  - Administer  ordered medications to maintain glucose within target range  - Assess nutritional intake and initiate nutrition service referral as needed  Outcome: Progressing     Problem: SKIN/TISSUE INTEGRITY - ADULT  Goal: Skin Integrity remains intact(Skin Breakdown Prevention)  Description: Assess:  -Perform Heber assessment every 2  -Clean and moisturize skin every 2  -Inspect skin when repositioning, toileting, and assisting with ADLS  -Assess under medical devices such as 2 every 2  -Assess extremities for adequate circulation and sensation     Bed Management:  -Have minimal linens on bed & keep smooth, unwrinkled  -Change linens as needed when moist or perspiring  -Avoid sitting or lying in one position for more than 2 hours while in bed  -Keep HOB at 2degrees     Toileting:  -Offer bedside commode  -Assess for incontinence every 2  -Use incontinent care products after each incontinent episode such as 2    Activity:  -Mobilize patient 2 times a day  -Encourage activity and walks on unit  -Encourage or provide ROM exercises   -Turn and reposition patient every 2 Hours  -Use appropriate equipment to lift or move patient in bed  -Instruct/ Assist with weight shifting every 2 when out of bed in chair  -Consider limitation of chair time 2 hour intervals    Skin Care:  -Avoid use of baby powder, tape, friction and shearing, hot water or constrictive clothing  -Relieve pressure over bony prominences using 2  -Do not massage red bony areas    Next Steps:  -Teach patient strategies to minimize risks such as 2   -Consider consults to  interdisciplinary teams such as 2  Outcome: Progressing  Goal: Incision(s), wounds(s) or drain site(s) healing without S/S of infection  Description: INTERVENTIONS  - Assess and document dressing, incision, wound bed, drain sites and surrounding tissue  - Provide patient and family education  - Perform skin care/dressing changes every 2  Outcome: Progressing

## 2024-09-26 NOTE — H&P
"H&P - Hospitalist   Name: Zain Gayle 59 y.o. male I MRN: [id number]  Unit/Bed#: Z1H4 I Date of Admission: 9/26/2024   Date of Service: 9/26/2024 I Hospital Day: 0     Assessment & Plan  Diabetic foot ulcer (HCC)  Patient presents to ED from wound care center with wound dehiscence and bone visualization of left hallux amputation site.  Denies fever, chills, or pain, although patient is neuropathic.  Denies complications to right amputation site  Vital signs stable and patient afebrile, mild leukocytosis 11.54.  Lactic acid WNL.  ESR 76, CRP 14.3  X-ray left foot revealing soft tissue ulceration overlying the region of the prior great toe amputation. No evidence of osteomyelitis of the first metatarsal head. No soft tissue gas   He received 2 g ceftriaxone in ED.  2 g vancomycin was started but patient only received half bag as he previously felt itchy due to Vanco infusion.  MRI ordered  Blood cultures pending  Continue to monitor leukocytosis  Spoke with Dr. Kim at bedside - plan for surgery tomorrow pending MRI results.  N.p.o. at midnight, hold antibiotics as patient is scheduled for surgery tomorrow morning and Dr. Kim will obtain culture in OR.  Podiatry continue to follow, appreciate their recommendations  Heparin for vte ppx, hold at midnight tonight, resume after surgery tmw    Lab Results   Component Value Date    HGBA1C 9.8 (H) 09/01/2024       No results for input(s): \"POCGLU\" in the last 72 hours.    Blood Sugar Average: Last 72 hrs:      Type 2 diabetes mellitus with hyperglycemia (HCC)  Most recent A1c 9.8 indicating poor glycemic control  Hold PTA Trulicity  18 units Lantus nightly and SSI ordered  Monitor Accu-Cheks closely  Avoid hypoglycemia  Lab Results   Component Value Date    HGBA1C 9.8 (H) 09/01/2024       No results for input(s): \"POCGLU\" in the last 72 hours.    Blood Sugar Average: Last 72 hrs:      Essential hypertension  /76 POA, currently soft with BP in 80s systolic  Hold " PTA amlodipine in setting of soft BP  Continue to monitor BP  CAD (coronary artery disease)  Patient with history of CAD s/p stents x 2  Patient currently without chest pain or shortness of breath  Continue PTA statin  Hold PTA ASA and Plavix in setting of surgery tomorrow  CHF (congestive heart failure) (HCC)  Patient euvolemic on exam  Most recent echo September 2024 revealing LVEF 55%, no wall motion abnormality, nodiastolic dysfunction  Continue PTA Lasix  Wt Readings from Last 3 Encounters:   09/26/24 95.7 kg (211 lb)   09/11/24 95.8 kg (211 lb 3.2 oz)   10/05/21 104 kg (229 lb)               VTE Pharmacologic Prophylaxis: VTE Score: 5 High Risk (Score >/= 5) - Pharmacological DVT Prophylaxis Ordered: heparin. Sequential Compression Devices Ordered.  Code Status: Level 1 - Full Code   Discussion with family: Patient declined call to .     Anticipated Length of Stay: Patient will be admitted on an observation basis with an anticipated length of stay of less than 2 midnights secondary to possible plan for surgery tomorrow with podiatry.    History of Present Illness   Chief Complaint: Diabetic foot infection    Zain Gayle is a 59 y.o. male with a PMH of DM, CAD s/p stent x 2, HTN, CHF who presents with wound dehiscence of amputation site on left foot.  Patient states he was referred to ED from wound care clinic this morning as the provider noticed wound dehiscence with yellow-green drainage and exposed bone to left hallux amputation site.  He underwent amputation of both left hallux and right partial 3rd ray resection with 4th and 5th digit amputation secondary to osteomyelitis by podiatrist, Dr. Kim September 6.  He denies fever, chills, chest pain, shortness of breath, or vomiting.  Denies pain to amputation sites although patient is neuropathic. Admits to nausea during administration of antibiotic.  States he has been keeping up with his wound care appointments and also has VNA 3 times a  week for maintenance of both L and R amputation wounds.     Review of Systems   Constitutional:  Negative for chills and fever.   Respiratory:  Negative for shortness of breath.    Cardiovascular:  Negative for chest pain, palpitations and leg swelling.   Gastrointestinal:  Positive for nausea. Negative for abdominal pain and vomiting.   Neurological:  Positive for numbness (patient with chronic diabetic neuropathy to b/l LE). Negative for dizziness and light-headedness.       I have reviewed the patient's PMH, PSH, Social History, Family History, Meds, and Allergies  Social History  Marital Status:    Occupation:   Patient Pre-hospital Living Situation: Home  Patient Pre-hospital Level of Mobility: walks with cane  Patient Pre-hospital Diet Restrictions:     Objective     Vitals:   Blood Pressure: 140/85 (09/26/24 1426)  Pulse: 87 (09/26/24 1426)  Temperature: 97.5 °F (36.4 °C) (09/26/24 1113)  Temp Source: Temporal (09/26/24 1113)  Respirations: 14 (09/26/24 1426)  SpO2: 96 % (09/26/24 1426)    Physical Exam  Constitutional:       Appearance: He is not ill-appearing.      Comments: Patient is sitting up in bed in no acute distress   Cardiovascular:      Rate and Rhythm: Normal rate and regular rhythm.   Pulmonary:      Effort: Pulmonary effort is normal. No respiratory distress.      Breath sounds: No wheezing, rhonchi or rales.   Abdominal:      General: Bowel sounds are normal.      Palpations: Abdomen is soft.      Tenderness: There is no abdominal tenderness. There is no guarding.   Musculoskeletal:         General: No swelling or tenderness.      Right lower leg: No edema.      Left lower leg: No edema.      Comments: L hallux amputation site with wound dehiscence and yellow purulence. bone visible on exam  R foot amputation site healing appropriately without dehiscence, purulence or odor.   Neurological:      Mental Status: He is alert.         Lines/Drains:  Lines/Drains/Airways       Active Status        None                        Additional Data:   Lab Results: I have reviewed the following results:   Results from last 7 days   Lab Units 09/26/24  1155   WBC Thousand/uL 11.54*   HEMOGLOBIN g/dL 14.8   HEMATOCRIT % 46.0   PLATELETS Thousands/uL 408*   SEGS PCT % 75   LYMPHO PCT % 17   MONO PCT % 5   EOS PCT % 1     Results from last 7 days   Lab Units 09/26/24  1155   SODIUM mmol/L 136   POTASSIUM mmol/L 4.2   CHLORIDE mmol/L 102   CO2 mmol/L 24   BUN mg/dL 39*   CREATININE mg/dL 1.06   ANION GAP mmol/L 10   CALCIUM mg/dL 9.8   ALBUMIN g/dL 4.5   TOTAL BILIRUBIN mg/dL 0.76   ALK PHOS U/L 110*   ALT U/L 14   AST U/L 12*   GLUCOSE RANDOM mg/dL 233*     Results from last 7 days   Lab Units 09/26/24  1155   INR  1.00         Lab Results   Component Value Date    HGBA1C 9.8 (H) 09/01/2024    HGBA1C 9.0 (H) 10/15/2023     Results from last 7 days   Lab Units 09/26/24  1155   LACTIC ACID mmol/L 1.4   PROCALCITONIN ng/ml 0.07       Imaging Review: Reviewed radiology reports from this admission including: xray(s).  Other Studies: No additional pertinent studies reviewed.    Administrative Statements     ** Please Note: This note has been constructed using a voice recognition system. **

## 2024-09-26 NOTE — PROGRESS NOTES
Wound Procedure Treatment Surgical Right Plantar    Performed by: Keira Diaz RN  Authorized by: Beatrice Griffin PA-C    Associated wounds:   Wound 09/26/24 Surgical Plantar Right  Wound cleansed with:  Dakin's 0.25%  Applied to periwound:  Skin prep  Applied primary dressing:  Gelling fiber and Silver  Applied secondary dressing:  Gauze and ABD  Dressing secured with:  Jerome and Tape

## 2024-09-26 NOTE — ASSESSMENT & PLAN NOTE
"Patient presents to ED from wound care center with wound dehiscence and bone visualization of left hallux amputation site.  Denies fever, chills, or pain, although patient is neuropathic.  Denies complications to right amputation site  Vital signs stable and patient afebrile, mild leukocytosis 11.54.  Lactic acid WNL.  ESR 76, CRP 14.3  X-ray left foot revealing soft tissue ulceration overlying the region of the prior great toe amputation. No evidence of osteomyelitis of the first metatarsal head. No soft tissue gas   He received 2 g ceftriaxone in ED.  2 g vancomycin was started but patient only received half bag as he previously felt itchy due to Vanco infusion.  MRI ordered  Blood cultures pending  Continue to monitor leukocytosis  Spoke with Dr. Kim at bedside - plan for surgery tomorrow pending MRI results.  N.p.o. at midnight, hold antibiotics as patient is scheduled for surgery tomorrow morning and Dr. Kim will obtain culture in OR.  Podiatry continue to follow, appreciate their recommendations  Heparin for vte ppx, hold at midnight tonight, resume after surgery tmw    Lab Results   Component Value Date    HGBA1C 9.8 (H) 09/01/2024       No results for input(s): \"POCGLU\" in the last 72 hours.    Blood Sugar Average: Last 72 hrs:      "

## 2024-09-26 NOTE — PROGRESS NOTES
Zain Gayle is a 59 y.o. male who is currently ordered Vancomycin IV with management by the Pharmacy Consult service.  Relevant clinical data and objective / subjective history reviewed.  Vancomycin Assessment:  Indication and Goal AUC/Trough: Bone/joint infection (goal -600, trough >10), -600, trough >10  Clinical Status: stable  Micro:     Renal Function:  SCr: 1.06 mg/dL (baseline)  CrCl: 91.6 mL/min  Renal replacement: Not on dialysis  Days of Therapy: 1  Current Dose: loading dose 2000mg 12:15pm   Vancomycin Plan:  New Dosinmg q12h start at 11pm   Estimated AUC: 455 mcg*hr/mL  Estimated Trough: 13.4 mcg/mL  Next Level:  6am  Renal Function Monitoring: Daily BMP and UOP  Pharmacy will continue to follow closely for s/sx of nephrotoxicity, infusion reactions and appropriateness of therapy.  BMP and CBC will be ordered per protocol. We will continue to follow the patient’s culture results and clinical progress daily.    Conchita Serrano, Pharmacist

## 2024-09-26 NOTE — ED PROVIDER NOTES
Final diagnoses:   Wound infection     ED Disposition       ED Disposition   Admit    Condition   Stable    Date/Time   u Sep 26, 2024  1:23 PM    Comment                  Assessment & Plan       Medical Decision Making  This patient presents with joint pain.  Given history, exam and workup patient likely has wound infection.  I have low suspicion for fracture, dislocation, significant ligamentous injury, septic arthritis, gout or new autoimmune arthropathy.  CBC shows a mild leukocytosis.  CMP negative for metabolic derangements.  Lactic acid negative for acidosis.  Sed rate and CRP elevated.  Patient's x-ray does not show active gas.  Less likely necrotizing fasciitis.  Patient's case discussed with his podiatrist who is his surgeon Dr. Robb Kim and recommended admission for MRI and potential surgical procedures.  Patient's case discussed with [facility] internal medicine and patient was accepted for further evaluation.  Patient agreed with plan of care.    Amount and/or Complexity of Data Reviewed  Labs: ordered.  Radiology: ordered.    Risk  Prescription drug management.  Decision regarding hospitalization.             Medications   vancomycin (VANCOCIN) IVPB (premix in dextrose) 1,000 mg 200 mL (has no administration in time range)   ceftriaxone (ROCEPHIN) 2 g/50 mL in dextrose IVPB (0 mg Intravenous Stopped 9/26/24 1216)   vancomycin (VANCOCIN) 2,000 mg in sodium chloride 0.9 % 500 mL IVPB (0 mg Intravenous Stopped 9/26/24 1309)   ondansetron (ZOFRAN) injection 4 mg (4 mg Intravenous Given 9/26/24 1243)   sodium chloride 0.9 % bolus 1,000 mL (1,000 mL Intravenous New Bag 9/26/24 1310)       ED Risk Strat Scores                           SBIRT 20yo+      Flowsheet Row Most Recent Value   Initial Alcohol Screen: US AUDIT-C     1. How often do you have a drink containing alcohol? 1 Filed at: 09/26/2024 1114   2. How many drinks containing alcohol do you have on a typical day you are drinking?  0 Filed at:  09/26/2024 1114   3a. Male UNDER 65: How often do you have five or more drinks on one occasion? 0 Filed at: 09/26/2024 1114   Audit-C Score 1 Filed at: 09/26/2024 1114   AJAY: How many times in the past year have you...    Used an illegal drug or used a prescription medication for non-medical reasons? Never Filed at: 09/26/2024 1114                            History of Present Illness       Chief Complaint   Patient presents with    Wound Check     Per pts provider pt has a wound dehiscence with bone showing on the left great toe, pt had surgery apprx 3 weeks ago        Past Medical History:   Diagnosis Date    Allergic     Arthritis     CHF (congestive heart failure) (HCC)     Coronary artery disease     Diabetes mellitus (HCC)     Diabetic foot ulcers (HCC)     Hypertension     Moderate aortic stenosis     MRSA exposure       Past Surgical History:   Procedure Laterality Date    CARPAL TUNNEL RELEASE      KNEE SURGERY      IN AMPUTATION METATARSAL W/TOE SINGLE Bilateral 9/6/2024    Procedure: RIGHT FOOT PARTIAL 3RD RAY RESECTION, 4TH AND 5TH DIGIT AMPUTATION, LEFT HALLUX AMPUTATION;  Surgeon: Robb Kim DPM;  Location: [facility] OR;  Service: Podiatry    TOE AMPUTATION        Family History   Problem Relation Age of Onset    Heart disease Mother     Heart disease Father     Cancer Father       Social History     Tobacco Use    Smoking status: Former    Smokeless tobacco: Never   Vaping Use    Vaping status: Never Used   Substance Use Topics    Alcohol use: Not Currently      E-Cigarette/Vaping    E-Cigarette Use Never User       E-Cigarette/Vaping Substances      I have reviewed and agree with the history as documented.     59-year-old male patient presents the ER for evaluation of wound. PMH: CAD, hypertension, GERD, type 2 diabetes, CHF, aortic valve stenosis, diabetic neuropathy.  Patient was seen by wound care and was brought over by wheelchair for further evaluation.  Patient has left metatarsal exposure  of left great toe.  Patient had multiple amputations of right and left foot by Dr. Robb Kim 20 days ago.  Patient states that his left foot has been having increased amount of drainage.  While at wound care they were able to probe to bone of his left great toe.  He has no noted fever, chills, nausea or vomiting.  No pain out of proportion, crepitus, ecchymosis or erythema.  No noted trauma or injury.      History provided by:  Patient      Review of Systems   Constitutional:  Negative for chills and fever.   HENT:  Negative for ear pain and sore throat.    Eyes:  Negative for pain and visual disturbance.   Respiratory:  Negative for cough and shortness of breath.    Cardiovascular:  Negative for chest pain and palpitations.   Gastrointestinal:  Negative for abdominal pain and vomiting.   Genitourinary:  Negative for dysuria and hematuria.   Musculoskeletal:  Negative for arthralgias and back pain.   Skin:  Positive for wound. Negative for color change and rash.   Neurological:  Negative for seizures and syncope.   All other systems reviewed and are negative.          Objective       ED Triage Vitals [09/26/24 1113]   Temperature Pulse Blood Pressure Respirations SpO2 Patient Position - Orthostatic VS   97.5 °F (36.4 °C) 99 117/86 22 99 % Sitting      Temp Source Heart Rate Source BP Location FiO2 (%) Pain Score    Temporal Monitor Left arm -- --      Vitals      Date and Time Temp Pulse SpO2 Resp BP Pain Score FACES Pain Rating User   09/26/24 1307 -- 81 95 % 16 87/54 -- -- LM   09/26/24 1306 -- -- 96 % 16 84/50 -- --    09/26/24 1113 97.5 °F (36.4 °C) 99 99 % 22 117/86 -- -- GP            Physical Exam  Vitals and nursing note reviewed.   Constitutional:       General: He is not in acute distress.     Appearance: He is well-developed.   HENT:      Head: Normocephalic and atraumatic.      Right Ear: External ear normal.      Left Ear: External ear normal.   Eyes:      Conjunctiva/sclera: Conjunctivae normal.    Cardiovascular:      Rate and Rhythm: Normal rate and regular rhythm.      Pulses: Normal pulses.      Heart sounds: Normal heart sounds.   Pulmonary:      Effort: Pulmonary effort is normal. No respiratory distress.      Breath sounds: Normal breath sounds.   Abdominal:      Palpations: Abdomen is soft.      Tenderness: There is no abdominal tenderness.   Musculoskeletal:         General: No swelling.      Cervical back: Neck supple.   Feet:      Comments: See media for images  Skin:     General: Skin is warm and dry.      Capillary Refill: Capillary refill takes less than 2 seconds.   Neurological:      Mental Status: He is alert and oriented to person, place, and time. Mental status is at baseline.   Psychiatric:         Mood and Affect: Mood normal.         Behavior: Behavior normal.         Results Reviewed       Procedure Component Value Units Date/Time    C-reactive protein [[id number]]  (Abnormal) Collected: 09/26/24 1155    Lab Status: Final result Specimen: Blood from Arm, Right Updated: 09/26/24 1257     CRP 14.3 mg/L     Sedimentation rate, automated [[id number]]  (Abnormal) Collected: 09/26/24 1155    Lab Status: Final result Specimen: Blood from Arm, Right Updated: 09/26/24 1247     Sed Rate 76 mm/hour     Procalcitonin [[id number]]  (Normal) Collected: 09/26/24 1155    Lab Status: Final result Specimen: Blood from Arm, Right Updated: 09/26/24 1234     Procalcitonin 0.07 ng/ml     Lactic acid [[id number]]  (Normal) Collected: 09/26/24 1155    Lab Status: Final result Specimen: Blood from Arm, Right Updated: 09/26/24 1225     LACTIC ACID 1.4 mmol/L     Narrative:      Result may be elevated if tourniquet was used during collection.    Comprehensive metabolic panel [[id number]]  (Abnormal) Collected: 09/26/24 1155    Lab Status: Final result Specimen: Blood from Arm, Right Updated: 09/26/24 1223     Sodium 136 mmol/L      Potassium 4.2 mmol/L      Chloride 102 mmol/L      CO2 24 mmol/L      ANION GAP 10  mmol/L      BUN 39 mg/dL      Creatinine 1.06 mg/dL      Glucose 233 mg/dL      Calcium 9.8 mg/dL      AST 12 U/L      ALT 14 U/L      Alkaline Phosphatase 110 U/L      Total Protein 8.9 g/dL      Albumin 4.5 g/dL      Total Bilirubin 0.76 mg/dL      eGFR 77 ml/min/1.73sq m     Narrative:      National Kid[employer] guidelines for Chronic Kidney Disease (CKD):     Stage 1 with normal or high GFR (GFR > 90 mL/min/1.73 square meters)    Stage 2 Mild CKD (GFR = 60-89 mL/min/1.73 square meters)    Stage 3A Moderate CKD (GFR = 45-59 mL/min/1.73 square meters)    Stage 3B Moderate CKD (GFR = 30-44 mL/min/1.73 square meters)    Stage 4 Severe CKD (GFR = 15-29 mL/min/1.73 square meters)    Stage 5 End Stage CKD (GFR <15 mL/min/1.73 square meters)  Note: GFR calculation is accurate only with a steady state creatinine    Protime-INR [[id number]]  (Normal) Collected: 09/26/24 1155    Lab Status: Final result Specimen: Blood from Arm, Right Updated: 09/26/24 1223     Protime 13.9 seconds      INR 1.00    Narrative:      INR Therapeutic Range    Indication                                             INR Range      Atrial Fibrillation                                               2.0-3.0  Hypercoagulable State                                    2.0.2.3  Left Ventricular Asist Device                            2.0-3.0  Mechanical Heart Valve                                  -    Aortic(with afib, MI, embolism, HF, LA enlargement,    and/or coagulopathy)                                     2.0-3.0 (2.5-3.5)     Mitral                                                             2.5-3.5  Prosthetic/Bioprosthetic Heart Valve               2.0-3.0  Venous thromboembolism (VTE: VT, PE        2.0-3.0    APTT [[id number]]  (Abnormal) Collected: 09/26/24 1155    Lab Status: Final result Specimen: Blood from Arm, Right Updated: 09/26/24 1223     PTT 35 seconds     CBC and differential [[id number]]  (Abnormal) Collected: 09/26/24  1155    Lab Status: Final result Specimen: Blood from Arm, Right Updated: 09/26/24 1205     WBC 11.54 Thousand/uL      RBC 5.49 Million/uL      Hemoglobin 14.8 g/dL      Hematocrit 46.0 %      MCV 84 fL      MCH 27.0 pg      MCHC 32.2 g/dL      RDW 14.3 %      MPV 10.5 fL      Platelets 408 Thousands/uL      nRBC 0 /100 WBCs      Segmented % 75 %      Immature Grans % 1 %      Lymphocytes % 17 %      Monocytes % 5 %      Eosinophils Relative 1 %      Basophils Relative 1 %      Absolute Neutrophils 8.71 Thousands/µL      Absolute Immature Grans 0.15 Thousand/uL      Absolute Lymphocytes 1.90 Thousands/µL      Absolute Monocytes 0.55 Thousand/µL      Eosinophils Absolute 0.15 Thousand/µL      Basophils Absolute 0.08 Thousands/µL     Blood culture #1 [[id number]] Collected: 09/26/24 1200    Lab Status: In process Specimen: Blood from Arm, Left Updated: 09/26/24 1202    Blood culture #2 [[id number]] Collected: 09/26/24 1155    Lab Status: In process Specimen: Blood from Arm, Right Updated: 09/26/24 1202            XR foot 3+ views LEFT    (Results Pending)   MRI inpatient order    (Results Pending)       Procedures    ED Medication and Procedure Management   Prior to Admission Medications   Prescriptions Last Dose Informant Patient Reported? Taking?   amLODIPine (NORVASC) 10 mg tablet   Yes No   Sig: Take 10 mg by mouth daily   aspirin (ECOTRIN LOW STRENGTH) 81 mg EC tablet   Yes No   Sig: Take 81 mg by mouth daily   atorvastatin (LIPITOR) 40 mg tablet   Yes No   Sig: Take 40 mg by mouth daily   benzonatate (TESSALON) 200 MG capsule   No No   Sig: Take 1 capsule (200 mg total) by mouth 3 (three) times a day as needed for cough   Patient not taking: Reported on 9/26/2024   clopidogrel (PLAVIX) 75 mg tablet   Yes No   dulaglutide (Trulicity) 0.75 MG/0.5ML injection   Yes No   Sig: Inject 0.75 mg under the skin every 7 days Pt. Takes on Fridays   Patient not taking: Reported on 9/26/2024   furosemide (LASIX) 40 mg tablet    Yes No   Sig: Take 40 mg by mouth daily   lisinopril (ZESTRIL) 5 mg tablet   No No   Sig: Take 1 tablet (5 mg total) by mouth daily   metoprolol succinate (TOPROL-XL) 50 mg 24 hr tablet   Yes No   Sig: Take 50 mg by mouth daily   omeprazole (PriLOSEC) 20 mg delayed release capsule   Yes No   Sig: Take 20 mg by mouth daily      Facility-Administered Medications Last Administration Doses Remaining   lidocaine (LMX) 4 % cream 9/26/2024 10:19 AM 0        Patient's Medications   Discharge Prescriptions    No medications on file     No discharge procedures on file.  ED SEPSIS DOCUMENTATION   Time reflects when diagnosis was documented in both MDM as applicable and the Disposition within this note       Time User Action Codes Description Comment    9/26/2024  1:23 PM Lu Alexandra Add [T14.8XXA,  L08.9] Wound infection                  CHERIE Quezada  09/26/24 1412

## 2024-09-26 NOTE — PROGRESS NOTES
Patient ID: Zain Gayle is a 59 y.o. male Date of Birth 1965       Chief Complaint   Patient presents with    New Patient Visit     Left foot non healing foot wound       Allergies:  Patient has no known allergies.    Diagnosis:      Diagnosis ICD-10-CM Associated Orders   1. Non-healing surgical wound, initial encounter  T81.89XA lidocaine (LMX) 4 % cream     Wound cleansing and dressings Surgical Right Plantar     Wound Procedure Treatment Surgical Right Plantar     Wound Procedure Treatment Surgical Left Foot     Wound cleansing and dressings Surgical Left Foot     Debridement Surgical Right Plantar      2. Wound dehiscence  T81.30XA Debridement Surgical Left Foot              Assessment and Plan :  Initial Evaluation of non- healing surgical wounds on bilateral feet. Pt found to have a wound dehiscence on Left hallux amputation. Wound bed with yellow green drainage and exposed bone.   Findings  And assessment discussed with patient and escorted pt to Emergency room for proper care, possible excisional debridement and IV antibiotics. Pt understands and consented.   Debrided as below  Right foot Wound management with silver alginate. See wound orders below   No harsh cleansers such as alcohol, peroxide, or antibacterial soap, do not submerge in water such as bathtub, hot tub, swimming pool, ocean, etc.   Can cleanse with Dakins at dressing changes.   Counseled on importance of frequent elevation of leg decreasing activity level for wound healing.  Continue proper offloading with forefoot relief off-loading shoe to the R foot. Applied a surgical shoe on L foot.    A1C results reviewed with the patient today.   Counseled on eating adequate protein (chicken, fish, yogurt, eggs and nuts (3-4 servings per day) and controlling diabetes. Discussed the importance of lowering elevated A1c to increase healing potential. Patient shows understanding.   Followup with podiatry       Subjective:   Patient is a 59 y.o.  male with pmhx DMII (A1c 9.8), CHF, Aortic Valve Stenosis, CAD s/p stents x2 (ASA 81mg) , HTN, GERD who presents for initial evaluation of nonhealing surgical wounds on bilateral feet which have been present since 9/6/24 after Left hallux amputation and Right partial 3rd ray resection with 4th and 5th digit amputation secondary to osteomyelitis by podiatrist, Dr. Robb Kim.  Since then pt was treated with IV antibiotics: vancomycin and ceftriaxone and then transitioned to PO Cefpodoxime for 3 days. Pt has been applying purachol Ag on the wound beds followed by xeroform. ABIs are normal with Right: 1.20, and Left: 1.24.  Does not have an odor.  No smoking, ETOH or drug use.  Pt denies any sob, fatigue, N/V, CP, fevers or chills.          The following portions of the patient's history were reviewed and updated as appropriate:   Patient Active Problem List   Diagnosis    Acute exacerbation of CHF (congestive heart failure) (HCC)    Acute respiratory failure with hypoxia (HCC)    ODALYS (acute kidney injury) (HCC)    Aortic valve stenosis    CAD (coronary artery disease)    Diabetic neuropathy associated with type 2 diabetes mellitus (HCC)    Elevated troponin    Essential hypertension    Gastroesophageal reflux disease without esophagitis    Type 2 diabetes mellitus with hyperglycemia (HCC)    Diabetic foot infection  (HCC)    Sepsis (HCC)     Past Medical History:   Diagnosis Date    Allergic     Arthritis     CHF (congestive heart failure) (HCC)     Coronary artery disease     Diabetes mellitus (HCC)     Diabetic foot ulcers (HCC)     Hypertension     Moderate aortic stenosis     MRSA exposure      Past Surgical History:   Procedure Laterality Date    CARPAL TUNNEL RELEASE      KNEE SURGERY      NH AMPUTATION METATARSAL W/TOE SINGLE Bilateral 9/6/2024    Procedure: RIGHT FOOT PARTIAL 3RD RAY RESECTION, 4TH AND 5TH DIGIT AMPUTATION, LEFT HALLUX AMPUTATION;  Surgeon: Robb Kim DPM;  Location: [facility];   Service: Podiatry    TOE AMPUTATION       Family History   Problem Relation Age of Onset    Heart disease Mother     Heart disease Father     Cancer Father       Social History     Socioeconomic History    Marital status:      Spouse name: None    Number of children: None    Years of education: None    Highest education level: None   Occupational History    None   Tobacco Use    Smoking status: Former    Smokeless tobacco: Never   Vaping Use    Vaping status: Never Used   Substance and Sexual Activity    Alcohol use: Not Currently    Drug use: None    Sexual activity: None   Other Topics Concern    None   Social History Narrative    None     Social Determinants of Health     Financial Resource Strain: Not on file   Food Insecurity: No Food Insecurity (9/4/2024)    Hunger Vital Sign     Worried About Running Out of Food in the Last Year: Never true     Ran Out of Food in the Last Year: Never true   Transportation Needs: No Transportation Needs (9/4/2024)    PRAPARE - Transportation     Lack of Transportation (Medical): No     Lack of Transportation (Non-Medical): No   Physical Activity: Not on file   Stress: Not on file   Social Connections: Not on file   Intimate Partner Violence: Not on file   Housing Stability: High Risk (9/4/2024)    Housing Stability Vital Sign     Unable to Pay for Housing in the Last Year: No     Number of Times Moved in the Last Year: 3     Homeless in the Last Year: No      No current facility-administered medications for this visit.    Current Outpatient Medications:     amLODIPine (NORVASC) 10 mg tablet, Take 10 mg by mouth daily, Disp: , Rfl:     aspirin (ECOTRIN LOW STRENGTH) 81 mg EC tablet, Take 81 mg by mouth daily, Disp: , Rfl:     atorvastatin (LIPITOR) 40 mg tablet, Take 40 mg by mouth daily, Disp: , Rfl:     clopidogrel (PLAVIX) 75 mg tablet, , Disp: , Rfl:     furosemide (LASIX) 40 mg tablet, Take 40 mg by mouth daily, Disp: , Rfl:     lisinopril (ZESTRIL) 5 mg tablet,  "Take 1 tablet (5 mg total) by mouth daily, Disp: 30 tablet, Rfl: 0    metoprolol succinate (TOPROL-XL) 50 mg 24 hr tablet, Take 50 mg by mouth daily, Disp: , Rfl:     omeprazole (PriLOSEC) 20 mg delayed release capsule, Take 20 mg by mouth daily, Disp: , Rfl:     benzonatate (TESSALON) 200 MG capsule, Take 1 capsule (200 mg total) by mouth 3 (three) times a day as needed for cough (Patient not taking: Reported on 9/26/2024), Disp: 20 capsule, Rfl: 0    dulaglutide (Trulicity) 0.75 MG/0.5ML injection, Inject 0.75 mg under the skin every 7 days Pt. Takes on Fridays (Patient not taking: Reported on 9/26/2024), Disp: , Rfl:     Facility-Administered Medications Ordered in Other Visits:     ondansetron (ZOFRAN) 4 mg/2 mL injection **ADS Override Pull**, , , ,     vancomycin (VANCOCIN) 2,000 mg in sodium chloride 0.9 % 500 mL IVPB, 2,000 mg, Intravenous, Once, CHERIE Quezada, Last Rate: 250 mL/hr at 09/26/24 1216, 2,000 mg at 09/26/24 1216    Review of Systems   Constitutional:  Negative for appetite change, chills, fatigue, fever and unexpected weight change.   HENT:  Negative for congestion, hearing loss and postnasal drip.    Respiratory:  Negative for cough and shortness of breath.    Cardiovascular:  Negative for leg swelling.   Musculoskeletal:  Positive for gait problem (amublates with walking stick).   Skin:  Positive for wound (bilateral feet). Negative for rash.   Neurological:  Negative for numbness.   Hematological:  Does not bruise/bleed easily.   Psychiatric/Behavioral: Negative.         Objective:  /76   Pulse 97   Temp 98 °F (36.7 °C)   Resp 18   Ht 5' 11.5\" (1.816 m)   Wt 95.7 kg (211 lb)   BMI 29.02 kg/m²   Pain Score: 0-No pain     Physical Exam  Vitals reviewed.   Constitutional:       General: He is not in acute distress.     Appearance: Normal appearance. He is well-developed. He is obese.   HENT:      Head: Normocephalic and atraumatic.   Cardiovascular:      Rate and Rhythm: Normal " rate.      Pulses:           Dorsalis pedis pulses are 2+ on the right side and 2+ on the left side.        Posterior tibial pulses are 2+ on the right side and 2+ on the left side.   Pulmonary:      Effort: Pulmonary effort is normal.   Musculoskeletal:         General: Deformity present.      Right lower leg: No edema.      Left lower leg: No edema.      Right foot: Deformity present.      Left foot: Deformity present.        Feet:    Feet:      Comments: 1. Adherent yellow slough on wound bed with macerated wound edges.   2. Yellow green non-odorous drainage from mixed yellow adherent slough, exudate on wound bed with exposed bone.   Skin:     General: Skin is warm and dry.      Findings: Wound (Bilateral Feet) present.   Neurological:      General: No focal deficit present.      Mental Status: He is alert and oriented to person, place, and time.      Gait: Gait abnormal.   Psychiatric:         Mood and Affect: Mood and affect normal.         Behavior: Behavior normal. Behavior is cooperative.                     Wound 09/26/24 Surgical Plantar Right (Active)   Wound Description Slough;Pink;Yellow;Epithelialization 09/26/24 1016   Esther-wound Assessment Pink;Maceration 09/26/24 1016   Wound Length (cm) 8.3 cm 09/26/24 1016   Wound Width (cm) 5.7 cm 09/26/24 1016   Wound Depth (cm) 0.5 cm 09/26/24 1016   Wound Surface Area (cm^2) 47.31 cm^2 09/26/24 1016   Wound Volume (cm^3) 23.655 cm^3 09/26/24 1016   Calculated Wound Volume (cm^3) 23.66 cm^3 09/26/24 1016   Drainage Amount Moderate 09/26/24 1016   Non-staged Wound Description Full thickness 09/26/24 1016   Dressing Status Intact 09/26/24 1016       Wound 09/26/24 Surgical Foot Left (Active)   Wound Description Slough;Yellow;Pink;Exposed bone 09/26/24 1013   Esther-wound Assessment Pink;Maceration 09/26/24 1013   Wound Length (cm) 4.4 cm 09/26/24 1013   Wound Width (cm) 1.5 cm 09/26/24 1013   Wound Depth (cm) 0.5 cm 09/26/24 1013   Wound Surface Area (cm^2) 6.6  "cm^2 09/26/24 1013   Wound Volume (cm^3) 3.3 cm^3 09/26/24 1013   Calculated Wound Volume (cm^3) 3.3 cm^3 09/26/24 1013   Drainage Amount Moderate 09/26/24 1013   Drainage Description Serosanguineous;Green 09/26/24 1013   Non-staged Wound Description Full thickness 09/26/24 1013   Dressing Status Intact 09/26/24 1013     Debridement   Wound 09/26/24 Surgical Plantar Right    Universal Protocol:  procedure performed by consultantConsent: Verbal consent obtained. Written consent obtained.  Risks and benefits: risks, benefits and alternatives were discussed  Consent given by: patient  Time out: Immediately prior to procedure a \"time out\" was called to verify the correct patient, procedure, equipment, support staff and site/side marked as required.  Patient understanding: patient states understanding of the procedure being performed  Patient identity confirmed: verbally with patient    Debridement Details  Performed by: PA  Debridement type: surgical  Level of debridement: subcutaneous tissue  Pain control: lidocaine 4%      Post-debridement measurements  Length (cm): 8.3  Width (cm): 5.7  Depth (cm): 0.6  Percent debrided: 75%  Surface Area (cm^2): 47.31  Area Debrided (cm^2): 35.48  Volume (cm^3): 28.39    Tissue and other material debrided: subcutaneous tissue  Devitalized tissue debrided: biofilm, exudate and slough  Instrument(s) utilized: curette  Bleeding: small  Hemostasis obtained with: pressure  Procedural pain (0-10): 0  Post-procedural pain: 0   Response to treatment: procedure was tolerated well    Debridement   Wound 09/26/24 Surgical Foot Left    Universal Protocol:  procedure performed by consultantConsent: Verbal consent obtained. Written consent obtained.  Risks and benefits: risks, benefits and alternatives were discussed  Consent given by: patient  Time out: Immediately prior to procedure a \"time out\" was called to verify the correct patient, procedure, equipment, support staff and site/side marked " as required.  Patient understanding: patient states understanding of the procedure being performed  Patient identity confirmed: verbally with patient    Debridement Details  Performed by: PA  Debridement type: surgical  Level of debridement: subcutaneous tissue  Pain control: lidocaine 4%      Post-debridement measurements  Length (cm): 4.4  Width (cm): 1.5  Depth (cm): 0.6  Percent debrided: 100%  Surface Area (cm^2): 6.6  Area Debrided (cm^2): 6.6  Volume (cm^3): 3.96    Tissue and other material debrided: subcutaneous tissue  Devitalized tissue debrided: exudate and slough  Instrument(s) utilized: curette and forceps  Bleeding: small  Hemostasis obtained with: pressure  Procedural pain (0-10): 0  Post-procedural pain: 0   Response to treatment: procedure was tolerated well                                 Wound Instructions:  Orders Placed This Encounter   Procedures    Wound cleansing and dressings Surgical Right Plantar     Right Foot Wounds    Wash your hands with soap and water.  Remove old dressing, discard into plastic bag and place in trash.  Cleanse the wound with 5 minute soak of Dakins, after 5 minutes remove wet gauze prior to applying a clean dressing. Do not use tissue or cotton balls. Do not scrub the wound. Pat dry using gauze.    Shower no     Apply opticel Ag or silver alginate  to the right foot wound.  Cover with gauze  Secure with Jerome and tape  Change dressing daily    Dakins called into [facility] pharmacy at [place]    Need to follow up with your surgeon for weight bearing status and clearances     Standing Status:   Future     Standing Expiration Date:   10/3/2024    Wound Procedure Treatment Surgical Right Plantar     This order was created via procedure documentation    Wound Procedure Treatment Surgical Left Foot     This order was created via procedure documentation    Wound cleansing and dressings Surgical Left Foot     Left Foot Wound    Wash your hands with soap and water.  Remove old  "dressing, discard into plastic bag and place in trash.  Cleanse the wound with Dakins prior to applying a clean dressing. Do not use tissue or cotton balls. Do not scrub the wound. Pat dry using gauze.    Shower no     Apply Calmoseptine to skin surrounding wound  Apply Dakins wet to dry to the left foot wound.  Cover with gauze and or ABD pad  Secure with Jerome and tape  Change dressing daily    Continue home care for wound care and teaching    Sent to ER for exposed bone wound dehiscence     Standing Status:   Future     Standing Expiration Date:   10/3/2024    Debridement Surgical Right Plantar     This order was created via procedure documentation    Debridement Surgical Left Foot     This order was created via procedure documentation       Total time spent today:  45 minutes.  This includes reviewing the patient's chart, pertinent physician records Dr. Jones, Podiatry, 9/11/24, Dr. Kim, Podiatry, 9/4/24, 9/6/24, 9/3/24 Podiatry, Dr. Sharif, Internal Medicine, 9/1/24, Dr. Dasilva, Internal Medicine, 9/9/24, 9/8/24, 9/7/24, 9/6/24, 9/5/24, 9/4/24, 9/3/24, Dr. Christine, Internal Medicine, 9/2/24, Hgb A1c on 9/1/24 R foot Xray on 9/1/24 & 9/6/24 L foot Xray on 9/1/24 & 9/6/24, B/L NALDO and waveform analysis, 9/3/24, R foot MRI, 9/3/24 and L foot MRI 9/3/24.      Beatrice Griffin PA-C, [facility]    Portions of the record may have been created with voice recognition software. Occasional wrong word or \"sound alike\" substitutions may have occurred due to the inherent limitations of voice recognition software. Read the chart carefully and recognize, using context, where substitutions have occurred.      "

## 2024-09-27 ENCOUNTER — ANESTHESIA EVENT (OUTPATIENT)
Dept: PERIOP | Facility: HOSPITAL | Age: 59
DRG: 638 | End: 2024-09-27
Payer: COMMERCIAL

## 2024-09-27 ENCOUNTER — ANESTHESIA (OUTPATIENT)
Dept: PERIOP | Facility: HOSPITAL | Age: 59
DRG: 638 | End: 2024-09-27
Payer: COMMERCIAL

## 2024-09-27 ENCOUNTER — APPOINTMENT (OUTPATIENT)
Dept: RADIOLOGY | Facility: HOSPITAL | Age: 59
DRG: 638 | End: 2024-09-27
Payer: COMMERCIAL

## 2024-09-27 LAB
ALBUMIN SERPL BCG-MCNC: 3.6 G/DL (ref 3.5–5)
ALP SERPL-CCNC: 87 U/L (ref 34–104)
ALT SERPL W P-5'-P-CCNC: 11 U/L (ref 7–52)
ANION GAP SERPL CALCULATED.3IONS-SCNC: 8 MMOL/L (ref 4–13)
AST SERPL W P-5'-P-CCNC: 10 U/L (ref 13–39)
BILIRUB SERPL-MCNC: 0.48 MG/DL (ref 0.2–1)
BUN SERPL-MCNC: 35 MG/DL (ref 5–25)
CALCIUM SERPL-MCNC: 8.8 MG/DL (ref 8.4–10.2)
CHLORIDE SERPL-SCNC: 104 MMOL/L (ref 96–108)
CO2 SERPL-SCNC: 25 MMOL/L (ref 21–32)
CREAT SERPL-MCNC: 1.15 MG/DL (ref 0.6–1.3)
ERYTHROCYTE [DISTWIDTH] IN BLOOD BY AUTOMATED COUNT: 14.4 % (ref 11.6–15.1)
GFR SERPL CREATININE-BSD FRML MDRD: 70 ML/MIN/1.73SQ M
GLUCOSE P FAST SERPL-MCNC: 140 MG/DL (ref 65–99)
GLUCOSE SERPL-MCNC: 140 MG/DL (ref 65–140)
GLUCOSE SERPL-MCNC: 140 MG/DL (ref 65–140)
GLUCOSE SERPL-MCNC: 151 MG/DL (ref 65–140)
GLUCOSE SERPL-MCNC: 163 MG/DL (ref 65–140)
GLUCOSE SERPL-MCNC: 182 MG/DL (ref 65–140)
HCT VFR BLD AUTO: 38.5 % (ref 36.5–49.3)
HGB BLD-MCNC: 12.3 G/DL (ref 12–17)
MCH RBC QN AUTO: 27 PG (ref 26.8–34.3)
MCHC RBC AUTO-ENTMCNC: 31.9 G/DL (ref 31.4–37.4)
MCV RBC AUTO: 85 FL (ref 82–98)
PLATELET # BLD AUTO: 320 THOUSANDS/UL (ref 149–390)
PMV BLD AUTO: 10.4 FL (ref 8.9–12.7)
POTASSIUM SERPL-SCNC: 3.9 MMOL/L (ref 3.5–5.3)
PROT SERPL-MCNC: 7.4 G/DL (ref 6.4–8.4)
RBC # BLD AUTO: 4.55 MILLION/UL (ref 3.88–5.62)
SODIUM SERPL-SCNC: 137 MMOL/L (ref 135–147)
WBC # BLD AUTO: 10.07 THOUSAND/UL (ref 4.31–10.16)

## 2024-09-27 PROCEDURE — 0Y6N0Z9 DETACHMENT AT LEFT FOOT, PARTIAL 1ST RAY, OPEN APPROACH: ICD-10-PCS | Performed by: STUDENT IN AN ORGANIZED HEALTH CARE EDUCATION/TRAINING PROGRAM

## 2024-09-27 PROCEDURE — 0QBP0ZZ EXCISION OF LEFT METATARSAL, OPEN APPROACH: ICD-10-PCS | Performed by: STUDENT IN AN ORGANIZED HEALTH CARE EDUCATION/TRAINING PROGRAM

## 2024-09-27 PROCEDURE — 82948 REAGENT STRIP/BLOOD GLUCOSE: CPT

## 2024-09-27 PROCEDURE — 88305 TISSUE EXAM BY PATHOLOGIST: CPT | Performed by: PATHOLOGY

## 2024-09-27 PROCEDURE — 85027 COMPLETE CBC AUTOMATED: CPT

## 2024-09-27 PROCEDURE — 87186 SC STD MICRODIL/AGAR DIL: CPT | Performed by: STUDENT IN AN ORGANIZED HEALTH CARE EDUCATION/TRAINING PROGRAM

## 2024-09-27 PROCEDURE — 87070 CULTURE OTHR SPECIMN AEROBIC: CPT | Performed by: STUDENT IN AN ORGANIZED HEALTH CARE EDUCATION/TRAINING PROGRAM

## 2024-09-27 PROCEDURE — 0J9R0ZZ DRAINAGE OF LEFT FOOT SUBCUTANEOUS TISSUE AND FASCIA, OPEN APPROACH: ICD-10-PCS | Performed by: STUDENT IN AN ORGANIZED HEALTH CARE EDUCATION/TRAINING PROGRAM

## 2024-09-27 PROCEDURE — 87077 CULTURE AEROBIC IDENTIFY: CPT | Performed by: STUDENT IN AN ORGANIZED HEALTH CARE EDUCATION/TRAINING PROGRAM

## 2024-09-27 PROCEDURE — 99232 SBSQ HOSP IP/OBS MODERATE 35: CPT | Performed by: INTERNAL MEDICINE

## 2024-09-27 PROCEDURE — 88311 DECALCIFY TISSUE: CPT | Performed by: PATHOLOGY

## 2024-09-27 PROCEDURE — 73630 X-RAY EXAM OF FOOT: CPT

## 2024-09-27 PROCEDURE — 87205 SMEAR GRAM STAIN: CPT | Performed by: STUDENT IN AN ORGANIZED HEALTH CARE EDUCATION/TRAINING PROGRAM

## 2024-09-27 PROCEDURE — 87075 CULTR BACTERIA EXCEPT BLOOD: CPT | Performed by: STUDENT IN AN ORGANIZED HEALTH CARE EDUCATION/TRAINING PROGRAM

## 2024-09-27 PROCEDURE — 80053 COMPREHEN METABOLIC PANEL: CPT

## 2024-09-27 RX ORDER — FENTANYL CITRATE/PF 50 MCG/ML
50 SYRINGE (ML) INJECTION
Status: DISCONTINUED | OUTPATIENT
Start: 2024-09-27 | End: 2024-09-27 | Stop reason: HOSPADM

## 2024-09-27 RX ORDER — LIDOCAINE HYDROCHLORIDE 10 MG/ML
INJECTION, SOLUTION EPIDURAL; INFILTRATION; INTRACAUDAL; PERINEURAL AS NEEDED
Status: DISCONTINUED | OUTPATIENT
Start: 2024-09-27 | End: 2024-09-27 | Stop reason: HOSPADM

## 2024-09-27 RX ORDER — PROPOFOL 10 MG/ML
INJECTION, EMULSION INTRAVENOUS CONTINUOUS PRN
Status: DISCONTINUED | OUTPATIENT
Start: 2024-09-27 | End: 2024-09-27

## 2024-09-27 RX ORDER — PHENYLEPHRINE HCL IN 0.9% NACL 1 MG/10 ML
SYRINGE (ML) INTRAVENOUS AS NEEDED
Status: DISCONTINUED | OUTPATIENT
Start: 2024-09-27 | End: 2024-09-27

## 2024-09-27 RX ORDER — CEFAZOLIN SODIUM 2 G/50ML
2000 SOLUTION INTRAVENOUS EVERY 8 HOURS
Status: DISCONTINUED | OUTPATIENT
Start: 2024-09-27 | End: 2024-09-28

## 2024-09-27 RX ORDER — MIDAZOLAM HYDROCHLORIDE 2 MG/2ML
INJECTION, SOLUTION INTRAMUSCULAR; INTRAVENOUS AS NEEDED
Status: DISCONTINUED | OUTPATIENT
Start: 2024-09-27 | End: 2024-09-27

## 2024-09-27 RX ORDER — SODIUM CHLORIDE, SODIUM LACTATE, POTASSIUM CHLORIDE, CALCIUM CHLORIDE 600; 310; 30; 20 MG/100ML; MG/100ML; MG/100ML; MG/100ML
INJECTION, SOLUTION INTRAVENOUS CONTINUOUS PRN
Status: DISCONTINUED | OUTPATIENT
Start: 2024-09-27 | End: 2024-09-27

## 2024-09-27 RX ORDER — FENTANYL CITRATE 50 UG/ML
INJECTION, SOLUTION INTRAMUSCULAR; INTRAVENOUS AS NEEDED
Status: DISCONTINUED | OUTPATIENT
Start: 2024-09-27 | End: 2024-09-27

## 2024-09-27 RX ORDER — ACETAMINOPHEN 325 MG/1
650 TABLET ORAL EVERY 6 HOURS PRN
Status: DISCONTINUED | OUTPATIENT
Start: 2024-09-27 | End: 2024-10-01 | Stop reason: HOSPADM

## 2024-09-27 RX ORDER — OXYCODONE HYDROCHLORIDE 5 MG/1
5 TABLET ORAL EVERY 4 HOURS PRN
Status: DISCONTINUED | OUTPATIENT
Start: 2024-09-27 | End: 2024-10-01 | Stop reason: HOSPADM

## 2024-09-27 RX ORDER — ONDANSETRON 2 MG/ML
4 INJECTION INTRAMUSCULAR; INTRAVENOUS ONCE AS NEEDED
Status: DISCONTINUED | OUTPATIENT
Start: 2024-09-27 | End: 2024-09-27 | Stop reason: HOSPADM

## 2024-09-27 RX ADMIN — MIDAZOLAM HYDROCHLORIDE 2 MG: 1 INJECTION, SOLUTION INTRAMUSCULAR; INTRAVENOUS at 11:01

## 2024-09-27 RX ADMIN — OXYCODONE HYDROCHLORIDE 5 MG: 5 TABLET ORAL at 20:54

## 2024-09-27 RX ADMIN — FENTANYL CITRATE 50 MCG: 50 INJECTION, SOLUTION INTRAMUSCULAR; INTRAVENOUS at 11:03

## 2024-09-27 RX ADMIN — PROPOFOL 50 MCG/KG/MIN: 10 INJECTION, EMULSION INTRAVENOUS at 11:03

## 2024-09-27 RX ADMIN — INSULIN GLARGINE 18 UNITS: 100 INJECTION, SOLUTION SUBCUTANEOUS at 22:01

## 2024-09-27 RX ADMIN — Medication 150 MCG: at 11:11

## 2024-09-27 RX ADMIN — FUROSEMIDE 40 MG: 40 TABLET ORAL at 08:38

## 2024-09-27 RX ADMIN — Medication 100 MCG: at 11:22

## 2024-09-27 RX ADMIN — SODIUM CHLORIDE, SODIUM LACTATE, POTASSIUM CHLORIDE, AND CALCIUM CHLORIDE: .6; .31; .03; .02 INJECTION, SOLUTION INTRAVENOUS at 11:01

## 2024-09-27 RX ADMIN — CEFAZOLIN SODIUM 2000 MG: 2 SOLUTION INTRAVENOUS at 22:00

## 2024-09-27 RX ADMIN — ATORVASTATIN CALCIUM 40 MG: 40 TABLET, FILM COATED ORAL at 08:38

## 2024-09-27 RX ADMIN — INSULIN LISPRO 1 UNITS: 100 INJECTION, SOLUTION INTRAVENOUS; SUBCUTANEOUS at 15:49

## 2024-09-27 RX ADMIN — METOPROLOL SUCCINATE 50 MG: 50 TABLET, EXTENDED RELEASE ORAL at 08:38

## 2024-09-27 RX ADMIN — CEFAZOLIN SODIUM 2000 MG: 2 SOLUTION INTRAVENOUS at 15:44

## 2024-09-27 NOTE — ASSESSMENT & PLAN NOTE
Patient presents to ED from wound care center with wound dehiscence and bone visualization of left hallux amputation site.  Denies fever, chills, or pain, although patient is neuropathic.  Denies complications to right amputation site  Vital signs stable and patient afebrile, mild leukocytosis 11.54.  Lactic acid WNL.  ESR 76, CRP 14.3  X-ray left foot revealing soft tissue ulceration overlying the region of the prior great toe amputation. No evidence of osteomyelitis of the first metatarsal head. No soft tissue gas   He received 2 g ceftriaxone in ED.  2 g vancomycin was started but patient only received half bag as he previously felt itchy due to Vanco infusion.  MRI ordered. Reviewed.   Continue to monitor leukocytosis  Plan for OR today  Start IV cefazolin post OR. Follow up cultures     Lab Results   Component Value Date    HGBA1C 9.8 (H) 09/01/2024       Recent Labs     09/26/24  1605 09/26/24  2045 09/27/24  0728 09/27/24  1154   POCGLU 301* 260* 163* 140       Blood Sugar Average: Last 72 hrs:  (P) 216

## 2024-09-27 NOTE — PLAN OF CARE
Problem: PAIN - ADULT  Goal: Verbalizes/displays adequate comfort level or baseline comfort level  Description: Interventions:  - Encourage patient to monitor pain and request assistance  - Assess pain using appropriate pain scale  - Administer analgesics based on type and severity of pain and evaluate response  - Implement non-pharmacological measures as appropriate and evaluate response  - Consider cultural and social influences on pain and pain management  - Notify physician/advanced practitioner if interventions unsuccessful or patient reports new pain  Outcome: Progressing     Problem: INFECTION - ADULT  Goal: Absence or prevention of progression during hospitalization  Description: INTERVENTIONS:  - Assess and monitor for signs and symptoms of infection  - Monitor lab/diagnostic results  - Monitor all insertion sites, i.e. indwelling lines, tubes, and drains  - Monitor endotracheal if appropriate and nasal secretions for changes in amount and color  - [place] appropriate cooling/warming therapies per order  - Administer medications as ordered  - Instruct and encourage patient and family to use good hand hygiene technique  - Identify and instruct in appropriate isolation precautions for identified infection/condition  Outcome: Progressing  Goal: Absence of fever/infection during neutropenic period  Description: INTERVENTIONS:  - Monitor WBC    Outcome: Progressing     Problem: SAFETY ADULT  Goal: Patient will remain free of falls  Description: INTERVENTIONS:  - Educate patient/family on patient safety including physical limitations  - Instruct patient to call for assistance with activity   - Consult OT/PT to assist with strengthening/mobility   - Keep Call bell within reach  - Keep bed low and locked with side rails adjusted as appropriate  - Keep care items and personal belongings within reach  - Initiate and maintain comfort rounds  - Make Fall Risk Sign visible to staff    - Apply yellow socks and  bracelet for high fall risk patients  - Consider moving patient to room near nurses station  Outcome: Progressing  Goal: Maintain or return to baseline ADL function  Description: INTERVENTIONS:  -  Assess patient's ability to carry out ADLs; assess patient's baseline for ADL function and identify physical deficits which impact ability to perform ADLs (bathing, care of mouth/teeth, toileting, grooming, dressing, etc.)  - Assess/evaluate cause of self-care deficits   - Assess range of motion  - Assess patient's mobility; develop plan if impaired  - Assess patient's need for assistive devices and provide as appropriate  - Encourage maximum independence but intervene and supervise when necessary  - Involve family in performance of ADLs  - Assess for home care needs following discharge   - Consider OT consult to assist with ADL evaluation and planning for discharge  - Provide patient education as appropriate  Outcome: Progressing  Goal: Maintains/Returns to pre admission functional level  Description: INTERVENTIONS:  - Perform AM-PAC 6 Click Basic Mobility/ Daily Activity assessment daily.  - Set and communicate daily mobility goal to care team and patient/family/caregiver.   - Collaborate with rehabilitation services on mobility goals if consulted    - Out of bed for toileting  - Record patient progress and toleration of activity level   Outcome: Progressing     Problem: DISCHARGE PLANNING  Goal: Discharge to home or other facility with appropriate resources  Description: INTERVENTIONS:  - Identify barriers to discharge w/patient and caregiver  - Arrange for needed discharge resources and transportation as appropriate  - Identify discharge learning needs (meds, wound care, etc.)  - Arrange for interpretive services to assist at discharge as needed  - Refer to Case Management Department for coordinating discharge planning if the patient needs post-hospital services based on physician/advanced practitioner order or  complex needs related to functional status, cognitive ability, or social support system  Outcome: Progressing     Problem: Knowledge Deficit  Goal: Patient/family/caregiver demonstrates understanding of disease process, treatment plan, medications, and discharge instructions  Description: Complete learning assessment and assess knowledge base.  Interventions:  - Provide teaching at level of understanding  - Provide teaching via preferred learning methods  Outcome: Progressing     Problem: GASTROINTESTINAL - ADULT  Goal: Maintains or returns to baseline bowel function  Description: INTERVENTIONS:  - Assess bowel function  - Encourage oral fluids to ensure adequate hydration  - Administer IV fluids if ordered to ensure adequate hydration  - Administer ordered medications as needed  - Encourage mobilization and activity  - Consider nutritional services referral to assist patient with adequate nutrition and appropriate food choices  Outcome: Progressing     Problem: GENITOURINARY - ADULT  Goal: Absence of urinary retention  Description: INTERVENTIONS:  - Assess patient’s ability to void and empty bladder  - Monitor I/O  - Bladder scan as needed  - Discuss with physician/AP medications to alleviate retention as needed  - Discuss catheterization for long term situations as appropriate  Outcome: Progressing     Problem: METABOLIC, FLUID AND ELECTROLYTES - ADULT  Goal: Glucose maintained within target range  Description: INTERVENTIONS:  - Monitor Blood Glucose as ordered  - Assess for signs and symptoms of hyperglycemia and hypoglycemia  - Administer ordered medications to maintain glucose within target range  - Assess nutritional intake and initiate nutrition service referral as needed  Outcome: Progressing     Problem: SKIN/TISSUE INTEGRITY - ADULT  Goal: Skin Integrity remains intact(Skin Breakdown Prevention)  Description: Assess:    -Assess extremities for adequate circulation and sensation     Bed Management:  -Have  minimal linens on bed & keep smooth, unwrinkled  -Change linens as needed when moist or perspiring  -    Toileting:  -Offer bedside commode      Activity:      Skin Care:    -Do not massage red bony areas    Next Steps:    Goal: Incision(s), wounds(s) or drain site(s) healing without S/S of infection  Description: INTERVENTIONS  - Assess and document dressing, incision, wound bed, drain sites and surrounding tissue  - Provide patient and family education    Outcome: Progressing

## 2024-09-27 NOTE — ASSESSMENT & PLAN NOTE
Patient with history of CAD s/p stents x 2  Patient currently without chest pain or shortness of breath  Continue PTA statin  Resume DAPT tomorrow

## 2024-09-27 NOTE — CASE MANAGEMENT
Case Management Assessment & Discharge Planning Note    Patient name Zain Gayle  Location /-01 MRN [id number]  : 1965 Date 2024       Current Admission Date: 2024  Current Admission Diagnosis:Diabetic foot ulcer (HCC)   Patient Active Problem List    Diagnosis Date Noted Date Diagnosed    Diabetic foot ulcer (HCC) 2024     CHF (congestive heart failure) (HCC) 2024     Sepsis (HCC) 2024     Diabetic foot infection  (HCC) 2024     Acute exacerbation of CHF (congestive heart failure) (HCC) 10/15/2023     Acute respiratory failure with hypoxia (HCC) 10/15/2023     ODALYS (acute kidney injury) ([facility]) 10/15/2023     Aortic valve stenosis 10/15/2023     CAD (coronary artery disease) 10/15/2023     Diabetic neuropathy associated with type 2 diabetes mellitus ([facility]) 10/15/2023     Essential hypertension 10/15/2023     Type 2 diabetes mellitus with hyperglycemia ([facility]) 10/15/2023     Elevated troponin 01/15/2020       LOS (days): 0  Geometric Mean LOS (GMLOS) (days):   Days to GMLOS:     OBJECTIVE:              Current admission status: Observation       Preferred Pharmacy:   [id number] [address]  [facility] [address]  Phone: [phone] Fax: [phone]    Primary Care Provider: No primary care provider on file.    Primary Insurance: AMBETTER  Secondary Insurance:     ASSESSMENT:  Active Health Care Proxies       enma damon [facility] Representative - [facility] Phone: [phone] (Mobile)                 Advance Directives  Does patient have a Health Care POA?: No  Was patient offered paperwork?: Yes (Patient declined, because he was already given the paperwork.)  Does patient currently have a Health Care decision maker?: Yes, please see Health Care Proxy section  Does patient have Advance Directives?: No  Was patient offered paperwork?: Yes (Patient declined, because he  was already given the paperwork.)  Primary Contact: Patient's Sister (Barbra)    Readmission Root Cause  30 Day Readmission: No    Patient Information  Admitted from:: Home  Mental Status: Alert  During Assessment patient was accompanied by: Not accompanied during assessment  Assessment information provided by:: Patient  Primary Caregiver: Self  Support Systems: Self, Family members  County of Residence: [place]  What city do you live in?: [place]  Home entry access options. Select all that apply.: Ramp  Type of Current Residence: [facility]  Living Arrangements: Lives Alone  Is patient a ?: No    Activities of Daily Living Prior to Admission  Functional Status: Independent  Completes ADLs independently?: Yes  Ambulates independently?: Yes  Does patient use assisted devices?: Yes  Assisted Devices (DME) used: Shower Chair, Straight Cane, Other (Comment) (glucometer)  Does patient currently own DME?: Yes  What DME does the patient currently own?: Shower Chair, Straight Cane, Other (Comment) (glucometer)  Does patient have a history of Outpatient Therapy (PT/OT)?: No  Does the patient have a history of Short-Term Rehab?: No  Does patient have a history of HHC?: Yes  Does patient currently have HHC?: Yes    Current Home Health Care  Type of Current Home Care Services: Nurse visit  Home Health Agency Name:: [facility]  Current Home Health Follow-Up Provider:: PCP    Patient Information Continued  Income Source: Unemployed  Does patient have prescription coverage?: Yes (Patient confirmed that he uses [facility] in [place], and he denied any barriers to obtaining or affording prescriptions.)  Does patient receive dialysis treatments?: No  Does patient have a history of substance abuse?: No  Does patient have a history of Mental Health Diagnosis?: No    Means of Transportation  Means of Transport to Appts:: Drives Self    Social Determinants of Health (SDOH)      Flowsheet Row Most Recent Value   Housing  Stability    In the last 12 months, was there a time when you were not able to pay the mortgage or rent on time? N   In the past 12 months, how many times have you moved where you were living? 3   At any time in the past 12 months, were you homeless or living in a shelter (including now)? N   Transportation Needs    In the past 12 months, has lack of transportation kept you from medical appointments or from getting medications? no   In the past 12 months, has lack of transportation kept you from meetings, work, or from getting things needed for daily living? No   Food Insecurity    Within the past 12 months, you worried that your food would run out before you got the money to buy more. Never true   Within the past 12 months, the food you bought just didn't last and you didn't have money to get more. Never true   Utilities    In the past 12 months has the electric, gas, oil, or water company threatened to shut off services in your home? No        DISCHARGE DETAILS:    Discharge planning discussed with:: Patient  Freedom of Choice: Yes  Comments - Freedom of Choice: CM discussed FOC as it pertains to discharge planning. Patient reported that he is current with [facility] and he prefers to resume services with them at home once he is discharged. He also requested a Lyft at that time, because he will not be able to drive.  CM contacted family/caregiver?: No- see comments (Patient declined.)  Were Treatment Team discharge recommendations reviewed with patient/caregiver?: Yes  Did patient/caregiver verbalize understanding of patient care needs?: Yes  Were patient/caregiver advised of the risks associated with not following Treatment Team discharge recommendations?: Yes    Requested Home Health Care         Is the patient interested in HHC at discharge?: Yes  Home Health Discipline requested:: Nursing  Home Health Agency Name:: [facility]  HHA External Referral Reason (only applicable if external HHA name  selected): Patient has established relationship with provider  Home Health Follow-Up Provider:: PCP  Home Health Services Needed:: Evaluate Functional Status and Safety, Wound/Ostomy Care, Diabetes Management, Heart Failure Management  Homebound Criteria Met:: Uses an Assist Device (i.e. cane, walker, etc), Requires the Assistance of Another Person for Safe Ambulation or to Leave the Home  Supporting Clincal Findings:: Fatigues Easliy in Short Distances, Limited Endurance    DME Referral Provided  Referral made for DME?: No    Other Referral/Resources/Interventions Provided:  Interventions: [facility]  Referral Comments: CM sent a referral to [facility] via [facility] to determine if they are able to resume care for patient at the time of discharge.    Would you like to participate in our [facility] service program?  : No - Declined    Treatment Team Recommendation: Home with Home Health Care  Discharge Destination Plan:: Home with Home Health Care  Transport at Discharge : Free Local Transportation

## 2024-09-27 NOTE — OP NOTE
OPERATIVE REPORT  PATIENT NAME: Zain Gayle    :  1965  MRN: [id number]  Pt Location: [facility] OR ROOM 01    SURGERY DATE: 2024    Surgeons and Role:     * Robb Kim DPM - Primary    Preop Diagnosis:  Osteomyelitis, left foot [M86.172]    Post Op Diagnosis:  Osteomyelitis, left foot [M86.172]    Procedure(s):  (1) Left foot partial 1st ray resection (Incision of bone cortex for osteomyelitis)  (2) Sesamoidectomy  (2) Left foot abscess incision & drainage    Specimen(s):  Pathology - Left 1st metatarsal head  Microbiology - Deep wound culture, 1st metatarsal head    Estimated Blood Loss:   Minimal    Hemostasis:   Pneumatic ankle tourniquet set to 250mmHg for 15 minutes    Anesthesia Type:   IV Sedation with Anesthesia    Operative Indications:  Wound probing deep to bone, positive findings on MRI for osteo    Operative Findings:  Minimal purulent drainage expressed intra-op      Complications:   None    Procedure and Technique:  Patient was brought back to the OR and left on his stretcher. After adequate IV sedation, an ankle tourniquet was applied to the left ankle. Left foot was prepped and draped in the usual aseptic fashion. Local anesthesia was administered to the LEFT foot consisting of 10cc of 1.0% Lidocaine plain. The ankle tourniquet was inflated at this time to 250mmHg.    The left foot post surgical wound from the hallux amputation was open and probing to the head of the first metatarsal bone. Using the 10 surgical blade, an incision was made just distal to the original hallux amputation site, extending this incision and minimal purulent drainage was expressed. A wound culture swab was taken at this time for deep cultures.    The first metatarsal head & sesamoid bone attachments were thus exposed and carefully dissected from soft tissue attachments. Next, the sagittal saw was introduced to make an incision of the bone cortex and the head of the left first metatarsal was removed from the  operative field. Sent to pathology for further analysis.    The operative site was then copiously irrigated with normal saline solution through a pulse lavage.    The surgical site was then closely re-approximated with 2-0 Vicryl for deep closure and 3-0 Nylon for skin closure.    Patient was then dressed with adaptic, betadine pain, dry sterile dressings. Patient's antibiotic should be geared towards culture taken today. No further podiatric surgical intervention anticipated.     I was present for the entire procedure.    Patient Disposition:  PACU              SIGNATURE: Robb Kim DPM  DATE: September 27, 2024  TIME: 10:21 AM

## 2024-09-27 NOTE — PROGRESS NOTES
Progress Note - Hospitalist   Name: Zain Gayle 59 y.o. male I MRN: [id number]  Unit/Bed#: -01 I Date of Admission: 9/26/2024   Date of Service: 9/27/2024 I Hospital Day: 0    Assessment & Plan  Diabetic foot ulcer (HCC)  Patient presents to ED from wound care center with wound dehiscence and bone visualization of left hallux amputation site.  Denies fever, chills, or pain, although patient is neuropathic.  Denies complications to right amputation site  Vital signs stable and patient afebrile, mild leukocytosis 11.54.  Lactic acid WNL.  ESR 76, CRP 14.3  X-ray left foot revealing soft tissue ulceration overlying the region of the prior great toe amputation. No evidence of osteomyelitis of the first metatarsal head. No soft tissue gas   He received 2 g ceftriaxone in ED.  2 g vancomycin was started but patient only received half bag as he previously felt itchy due to Vanco infusion.  MRI ordered. Reviewed.   Continue to monitor leukocytosis  Plan for OR today  Start IV cefazolin post OR. Follow up cultures     Lab Results   Component Value Date    HGBA1C 9.8 (H) 09/01/2024       Recent Labs     09/26/24  1605 09/26/24  2045 09/27/24  0728 09/27/24  1154   POCGLU 301* 260* 163* 140       Blood Sugar Average: Last 72 hrs:  (P) 216    Type 2 diabetes mellitus with hyperglycemia (HCC)  Most recent A1c 9.8 indicating poor glycemic control  Hold PTA Trulicity  18 units Lantus nightly and SSI ordered  Monitor Accu-Cheks closely  Avoid hypoglycemia  Lab Results   Component Value Date    HGBA1C 9.8 (H) 09/01/2024       Recent Labs     09/26/24  1605 09/26/24  2045 09/27/24  0728 09/27/24  1154   POCGLU 301* 260* 163* 140       Blood Sugar Average: Last 72 hrs:  (P) 216    Essential hypertension  Monitor BP  CAD (coronary artery disease)  Patient with history of CAD s/p stents x 2  Patient currently without chest pain or shortness of breath  Continue PTA statin  Resume DAPT tomorrow   CHF (congestive heart failure)  (HCC)  Patient euvolemic on exam  Most recent echo 2024 revealing LVEF 55%, no wall motion abnormality, nodiastolic dysfunction  Continue PTA Lasix  Wt Readings from Last 3 Encounters:   24 95.7 kg (211 lb)   24 95.8 kg (211 lb 3.2 oz)   10/05/21 104 kg (229 lb)               VTE Pharmacologic Prophylaxis: VTE Score: 5  held for or    Mobility:   Basic Mobility Inpatient Raw Score: 24  JH-HLM Goal: 8: Walk 250 feet or more  JH-HLM Achieved: 6: Walk 10 steps or more  JH-HLM Goal NOT achieved. Continue with multidisciplinary rounding and encourage appropriate mobility to improve upon JH-HLM goals.    Patient Centered Rounds: I performed bedside rounds with nursing staff today.   Discussions with Specialists or Other Care Team Provider: Podiatry     Education and Discussions with Family / Patient:     Current Length of Stay: 0 day(s)  Current Patient Status: Observation   Certification Statement: The patient will continue to require additional inpatient hospital stay due to above  Discharge Plan: Anticipate discharge in 24-48 hrs to discharge location to be determined pending rehab evaluations.    Code Status: Level 1 - Full Code    Subjective   No acute complaints.     Objective     Vitals:   Temp (24hrs), Av °F (36.7 °C), Min:97.7 °F (36.5 °C), Max:98.4 °F (36.9 °C)    Temp:  [97.7 °F (36.5 °C)-98.4 °F (36.9 °C)] 97.8 °F (36.6 °C)  HR:  [60-77] 60  Resp:  [18] 18  BP: ()/(53-94) 114/81  SpO2:  [94 %-98 %] 98 %  Body mass index is 29.02 kg/m².     Input and Output Summary (last 24 hours):     Intake/Output Summary (Last 24 hours) at 2024 1449  Last data filed at 2024 1241  Gross per 24 hour   Intake 852 ml   Output 3625 ml   Net -2773 ml       Physical Exam  Vitals and nursing note reviewed.   Cardiovascular:      Rate and Rhythm: Normal rate and regular rhythm.   Pulmonary:      Effort: Pulmonary effort is normal.      Breath sounds: Normal breath sounds.   Abdominal:       General: Bowel sounds are normal.      Palpations: Abdomen is soft.      Tenderness: There is no abdominal tenderness.   Musculoskeletal:      Cervical back: Normal range of motion and neck supple.   Neurological:      General: No focal deficit present.      Mental Status: He is oriented to person, place, and time.          Lines/Drains:  Lines/Drains/Airways       Active Status       None                            Lab Results: I have reviewed the following results: CBC/BMP:   .     09/27/24  0542   WBC 10.07   HGB 12.3   HCT 38.5      SODIUM 137   K 3.9      CO2 25   BUN 35*   CREATININE 1.15   GLUC 140       Results from last 7 days   Lab Units 09/27/24  0542 09/26/24  1155   WBC Thousand/uL 10.07 11.54*   HEMOGLOBIN g/dL 12.3 14.8   HEMATOCRIT % 38.5 46.0   PLATELETS Thousands/uL 320 408*   SEGS PCT %  --  75   LYMPHO PCT %  --  17   MONO PCT %  --  5   EOS PCT %  --  1     Results from last 7 days   Lab Units 09/27/24  0542   SODIUM mmol/L 137   POTASSIUM mmol/L 3.9   CHLORIDE mmol/L 104   CO2 mmol/L 25   BUN mg/dL 35*   CREATININE mg/dL 1.15   ANION GAP mmol/L 8   CALCIUM mg/dL 8.8   ALBUMIN g/dL 3.6   TOTAL BILIRUBIN mg/dL 0.48   ALK PHOS U/L 87   ALT U/L 11   AST U/L 10*   GLUCOSE RANDOM mg/dL 140     Results from last 7 days   Lab Units 09/26/24  1155   INR  1.00     Results from last 7 days   Lab Units 09/27/24  1154 09/27/24  0728 09/26/24  2045 09/26/24  1605   POC GLUCOSE mg/dl 140 163* 260* 301*         Results from last 7 days   Lab Units 09/26/24  1155   LACTIC ACID mmol/L 1.4   PROCALCITONIN ng/ml 0.07       Recent Cultures (last 7 days):   Results from last 7 days   Lab Units 09/26/24  1200 09/26/24  1155   BLOOD CULTURE  Received in Microbiology Lab. Culture in Progress. Received in Microbiology Lab. Culture in Progress.       Imaging Review: Reviewed radiology reports from this admission including: xray(s).  Other Studies: EKG was reviewed.     Last 24 Hours Medication List:      Current Facility-Administered Medications:     aspirin (ECOTRIN LOW STRENGTH) EC tablet 81 mg, Daily    atorvastatin (LIPITOR) tablet 40 mg, Daily    ceFAZolin (ANCEF) IVPB (premix in dextrose) 2,000 mg 50 mL, Q8H    clopidogrel (PLAVIX) tablet 75 mg, Daily    furosemide (LASIX) tablet 40 mg, Daily    insulin glargine (LANTUS) subcutaneous injection 18 Units 0.18 mL, HS    insulin lispro (HumALOG/ADMELOG) 100 units/mL subcutaneous injection 1-6 Units, TID AC **AND** Fingerstick Glucose (POCT), TID AC    metoprolol succinate (TOPROL-XL) 24 hr tablet 50 mg, Daily    ondansetron (ZOFRAN) injection 4 mg, Q6H PRN    Administrative Statements   Today, Patient Was Seen By: Familia Blackwood MD  I have spent a total time of 25 minutes in caring for this patient on the day of the visit/encounter including Diagnostic results, Instructions for management, Risk factor reductions, Counseling / Coordination of care, Reviewing / ordering tests, medicine, procedures  , Obtaining or reviewing history  , and Communicating with other healthcare professionals .    **Please Note: This note may have been constructed using a voice recognition system.**

## 2024-09-27 NOTE — ANESTHESIA PREPROCEDURE EVALUATION
"Procedure:  left foot first ray resection (Left: Foot)    Relevant Problems   CARDIO   (+) Acute exacerbation of CHF (congestive heart failure) (HCC)   (+) Aortic valve stenosis   (+) CAD (coronary artery disease)   (+) CHF (congestive heart failure) (HCC)   (+) Essential hypertension      ENDO   (+) Type 2 diabetes mellitus with hyperglycemia (HCC)      /RENAL   (+) ODALYS (acute kidney injury) (HCC)      NEURO/PSYCH   (+) Diabetic neuropathy associated with type 2 diabetes mellitus (HCC)      PULMONARY   (+) Acute respiratory failure with hypoxia (HCC)      Orthopedic/Musculoskeletal   (+) Diabetic foot infection  (HCC)      Other   (+) Sepsis (HCC)        Physical Exam    Airway    Mallampati score: III  TM Distance: >3 FB  Neck ROM: full     Dental        Cardiovascular      Pulmonary   No wheezes    Other Findings    Anesthesia Plan  ASA Score- 4     Anesthesia Type- IV sedation with anesthesia with ASA Monitors.         Additional Monitors:     Airway Plan:     Comment: Consent for regional, GA, and A-line in case of instability.  Start case with phenylephrine or levophed drip.  Propofol-sparing sedation as much as possible..       Plan Factors-    Chart reviewed. EKG reviewed. Imaging results reviewed. Existing labs reviewed. Patient summary reviewed.    Patient is not a current smoker.  Patient did not smoke on day of surgery.            Induction- intravenous.    Postoperative Plan- Plan for postoperative opioid use.     Perioperative Resuscitation Plan - Level 1 - Full Code.       Informed Consent- Anesthetic plan and risks discussed with patient.  I personally reviewed this patient with the CRNA. Discussed and agreed on the Anesthesia Plan with the CRNA..      VITALS  /70   Pulse 71   Temp 98.4 °F (36.9 °C)   Resp 18   Ht 5' 11.5\" (1.816 m)   SpO2 95%   BMI 29.02 kg/m²  BP Readings from Last 3 Encounters:   09/27/24 114/70   09/26/24 120/76   09/12/24 123/66        LABS  Results from Last 12 " Months   Lab Units 09/27/24  0542 09/26/24  1155   WBC Thousand/uL 10.07 11.54*   HEMOGLOBIN g/dL 12.3 14.8   HEMATOCRIT % 38.5 46.0   PLATELETS Thousands/uL 320 408*     Results from Last 12 Months   Lab Units 09/27/24  0542 09/26/24  1155 09/03/24  0526 09/02/24  0545 09/01/24  0756 08/20/24  0841 10/16/23  0400 10/15/23  1248 10/15/23  0713   SODIUM mmol/L 137 136   < > 132*  --    < > 137   < >  --    POTASSIUM mmol/L 3.9 4.2   < > 4.2  --    < > 3.9   < >  --    CHLORIDE mmol/L 104 102   < > 100  --    < > 102   < >  --    CO2 mmol/L 25 24   < > 25  --    < > 28   < >  --    ANION GAP mmol/L 8 10   < > 7  --    < > 7   < >  --    BUN mg/dL 35* 39*   < > 34*  --    < > 25   < >  --    CREATININE mg/dL 1.15 1.06   < > 1.05  --    < > 1.25   < >  --    CALCIUM mg/dL 8.8 9.8   < > 8.6  --    < > 9.1   < >  --    GLUCOSE RANDOM mg/dL 140 233*   < > 375*  --    < > 203*   < >  --    PHOSPHORUS mg/dL  --   --   --  3.0  --   --  2.7  --   --    MAGNESIUM mg/dL  --   --   --  1.9  --   --   --   --   --    HEMOGLOBIN A1C %  --   --   --   --  9.8*  --   --   --  9.0*    < > = values in this interval not displayed.     Results from Last 12 Months   Lab Units 09/26/24  1155 10/15/23  0340   INR  1.00 1.0   PTT seconds 35*  --        ECG      ECHOCARDIOGRAPHY OR OTHER TESTING/IMAGING  Encounter Date: 09/01/24   ECG 12 lead   Result Value    Ventricular Rate 59    Atrial Rate 59    NY Interval 180    QRSD Interval 94    QT Interval 460    QTC Interval 455    P Axis 57    QRS Axis 56    T Wave Axis 113    Narrative    Sinus bradycardia  Nonspecific ST and T wave abnormality  Abnormal ECG  When compared with ECG of 01-SEP-2024 03:47,  Vent. rate has decreased BY  67 BPM  ST no longer depressed in Lateral leads  Flat T waves now evident in Anterior leads  Confirmed by Tim Whitlock ([id number]) on 9/2/2024 5:42:24 PM     No results found for this or any previous visit. History    CHF, ODALYS, aortic stenosis, hypertension, GERD,  DM, CAD, elevated troponin     Interpretation Summary       •  Left Ventricle: Left ventricular cavity size is normal. Wall thickness is increased. There is mild to moderate concentric hypertrophy. LVEF is likely 50-55% Wall motion cannot be accurately assessed. Diastolic function is moderately abnormal, consistent with grade II (pseudonormal) relaxation.  •  Left Atrium: The atrium is mildly dilated.  •  Right Atrium: The atrium is mildly dilated.  •  Aortic Valve: The leaflets are moderately thickened. The leaflets are moderately calcified. There is moderately reduced mobility. There is moderate to severe stenosis. LVOT VTI of 22, AV VTI of 84, DVI of 0.26, mean gradient 29, max gradient of 53.95, with Valve area of 1.     ------- ANESTHESIA RISK-BENEFIT DISCUSSION -------  BENEFITS OF A SPECIALIZED ANESTHESIA TEAM INCLUDE (NBK [id number], PMID 56876666):  (1) Reduce mortality and morbidity for major surgeries/procedures. (2) The team provides analgesia/sedation/amnesia/akinesia as safely as possible. (3) The team strives to reduce discomfort as safely as possible.    RISKS, AND PLANS TO MITIGATE RISKS, INCLUDE:    - Neurologic system: IntraOp awareness (Risk is ~1:1,000 - 1:14,000; PMID 21561564), Stroke (Risk ~<0.1-2% for most cases; PMID [id number]), nerve injury, vision loss, and POCD.  Since regional anesthesia may be used, risks of block failure, bleeding, infection, and nerve injury were discussed.  - Airway and Pulmonary system: Dental or mouth injury, throat pain, critical hypoxia, pneumothorax, prolonged intubation, post-op respiratory compromise.  Airway/Intubation risks and prior data: No prior advanced airway notes in [facility] EMR  Major ARISCAT risk factors for pulmonary complications include: none, yielding a score of 0-1= Low risk, 1.6%.  - Cardiovascular system: Hypotension, arrhythmias, cardiac injury or arrest, blood clots, bleeding, infection, vascular injuries.  Newton's RCRI score items: ICM and CHF,  yielding an RCRI Score of 2= 7% risk of MACE. Discussion of exercise tolerance or stress testing is warranted: High cardiac risk however this is an urgent procedure given possibility for progressive sepsis with related cardiac risks  Are maría-op or intra-op beta blockers indicated? (PMID [id number]): no, patient has already taken recently.  - FEN/GI system: Aspiration risk (~0.5% per PMC 2172503) and PONV (10-80% per Apfel score) especially if the patient has not fasted.  ASA NPO guideline compliance?: Yes  - Medication risk assessment: Allergic reactions, excessive bleeding with anticoagulant use, overdoses, drug-drug interactions, injury to a fetus or  in pregnant or breastfeeding patients, maría-procedural sedation including while driving/operating machinery.  Recent relevant medications: See MAR or Med Review  Personal or family history of anesthesia complications: no  Pregnancy Status: N/A  - Estimate mortality risks associated with anesthesia based on ASA-PS (PMID 96869050): ASA-PS IV: risk 1:1,800

## 2024-09-27 NOTE — PROGRESS NOTES
Patient:  LYNDSEY DACOSTA    MRN:  [id number]    Jono Request ID:  [id number]    Level of care reserved:  [facility]    Partner Reserved:  [facility] [place] [address] [phone]    Clinical needs requested:    Geography searched:  [id number]    Start of Service:    Request sent:  1:44pm EDT on 9/27/2024 by Aida Simeon    Partner reserved:  2:01pm EDT on 9/27/2024 by Aida Simeon    Choice list shared:  2:01pm EDT on 9/27/2024 by Aida Simeon

## 2024-09-27 NOTE — UTILIZATION REVIEW
Initial Clinical Review    Admission: Date/Time/Statement:   Admission Orders (From admission, onward)       Ordered        09/26/24 1328  Place in Observation  Once                          Orders Placed This Encounter   Procedures    Place in Observation     Standing Status:   Standing     Number of Occurrences:   1     Order Specific Question:   Level of Care     Answer:   Med Surg [16]     ED Arrival Information       Expected   -    Arrival   9/26/2024 11:06    Acuity   Urgent              Means of arrival   Wheelchair    Escorted by   Self    Service   Hospitalist    Admission type   Emergency              Arrival complaint   WOUND CHECK             Chief Complaint   Patient presents with    Wound Check     Per pts provider pt has a wound dehiscence with bone showing on the left great toe, pt had surgery apprx 3 weeks ago        Initial Presentation: 59 y.o. male to ED from home w/  PMH of DM, CAD s/p stent x 2, HTN, CHF who presents with wound dehiscence of amputation site on left foot. Referred to ED from wound clinic . yellow-green drainage and exposed bone to left hallux amputation site.  underwent amputation of both left hallux and right partial 3rd ray resection with 4th and 5th digit amputation secondary to osteomyelitis  on 9/6. + nausea w/ abx . X-ray left foot revealing soft tissue ulceration overlying the region of the prior great toe amputation. No evidence of osteomyelitis of the first metatarsal head. No soft tissue gas . mild leukocytosis 11.54. Lactic acid WNL. ESR 76, CRP 14.3 . Given ceftriaxone and vanco in ED . Admitted OBS status w/ diabetic foot ulcer . Plan for MRI , f/u BC , monitor wbc , NPO at MN , podiatry consult . DM SSI and monitor . HTN soft , hold amlodipine and monitor . CAD statin , hold plavix and asa for sx . CHF cont PTA lasix.     Anticipated Length of Stay/Certification Statement: Patient will be admitted on an observation basis with an anticipated length of stay of less  than 2 midnights secondary to possible plan for surgery tomorrow with podiatry.     9/26 Podiatry Consult   chronic left foot post surgical wound, probes to bone. Plan L foot MRI pending for surgical planning . OR requested for 1100 L foot partial 1st ray resection . NPO after MN .     9/27 OP Note   Procedure(s):  (1) Left foot partial 1st ray resection (Incision of bone cortex for osteomyelitis)  (2) Sesamoidectomy  (2) Left foot abscess incision & drainage   Operative Findings:  Minimal purulent drainage expressed intra-op    ED Treatment-Medication Administration from 09/26/2024 1106 to 09/26/2024 1536         Date/Time Order Dose Route Action     09/26/2024 1201 ceftriaxone (ROCEPHIN) 2 g/50 mL in dextrose IVPB 2,000 mg Intravenous New Bag     09/26/2024 1216 vancomycin (VANCOCIN) 2,000 mg in sodium chloride 0.9 % 500 mL IVPB 2,000 mg Intravenous New Bag     09/26/2024 1243 ondansetron (ZOFRAN) injection 4 mg 4 mg Intravenous Given     09/26/2024 1310 sodium chloride 0.9 % bolus 1,000 mL 1,000 mL Intravenous New Bag     09/26/2024 1500 aspirin (ECOTRIN LOW STRENGTH) EC tablet 81 mg -- Oral Held Dose     09/26/2024 1430 enoxaparin (LOVENOX) subcutaneous injection 40 mg -- Subcutaneous Held Dose            Scheduled Medications:  [Held by provider] aspirin, 81 mg, Oral, Daily  atorvastatin, 40 mg, Oral, Daily  [Held by provider] clopidogrel, 75 mg, Oral, Daily  furosemide, 40 mg, Oral, Daily  insulin glargine, 18 Units, Subcutaneous, HS  insulin lispro, 1-6 Units, Subcutaneous, TID AC  metoprolol succinate, 50 mg, Oral, Daily      Continuous IV Infusions:     PRN Meds:  ondansetron, 4 mg, Intravenous, Q6H PRN      ED Triage Vitals   Temperature Pulse Respirations Blood Pressure SpO2 Pain Score   09/26/24 1113 09/26/24 1113 09/26/24 1113 09/26/24 1113 09/26/24 1113 09/26/24 1651   97.5 °F (36.4 °C) 99 22 117/86 99 % No Pain     Weight (last 2 days)       None            Vital Signs (last 3 days)       Date/Time  Temp Pulse Resp BP MAP (mmHg) SpO2 O2 Device Patient Position - Orthostatic VS Pain    09/27/24 07:28:23 -- 71 -- 114/70 85 95 % -- -- --    09/26/24 2301 -- -- -- -- -- 94 % None (Room air) -- No Pain    09/26/24 21:27:05 98.4 °F (36.9 °C) 68 18 101/60 74 96 % -- -- --    09/26/24 1651 -- 70 -- -- -- 97 % None (Room air) -- No Pain    09/26/24 15:40:39 98.1 °F (36.7 °C) 77 18 91/53 66 97 % -- -- --    09/26/24 1426 -- 87 14 140/85 -- 96 % None (Room air) Sitting --    09/26/24 1307 -- 81 16 87/54 -- 95 % None (Room air) Sitting --    09/26/24 1306 -- -- 16 84/50 -- 96 % None (Room air) Sitting --    09/26/24 1113 97.5 °F (36.4 °C) 99 22 117/86 -- 99 % None (Room air) Sitting --              Pertinent Labs/Diagnostic Test Results:   Radiology:  MRI foot/forefoot toes left w wo contrast   Final Interpretation by Zuleyka Osman MD (09/26 2027)      Predominantly nonspecific altered marrow signal intensity in the distal first metatarsal suspicious for early osteomyelitis, given adjacent soft tissue wound extending to bone. Small focus of T1 hypointensity typical of osteomyelitis.      No abscess.      Plantar fibromas.         Workstation performed: [id number]         XR foot 3+ views LEFT   Final Interpretation by Nael Bush MD (09/26 1413)      Soft tissue ulceration overlying the region of the prior great toe amputation. No evidence of osteomyelitis of the first metatarsal head. No soft tissue gas         Computerized Assisted Algorithm (CAA) may have been used to analyze all applicable images.         Workstation performed: [id number]           Cardiology:  No orders to display     GI:  No orders to display           Results from last 7 days   Lab Units 09/27/24  0542 09/26/24  1155   WBC Thousand/uL 10.07 11.54*   HEMOGLOBIN g/dL 12.3 14.8   HEMATOCRIT % 38.5 46.0   PLATELETS Thousands/uL 320 408*   TOTAL NEUT ABS Thousands/µL  --  8.71*         Results from last 7 days   Lab Units 09/27/24  0531  09/26/24  1155   SODIUM mmol/L 137 136   POTASSIUM mmol/L 3.9 4.2   CHLORIDE mmol/L 104 102   CO2 mmol/L 25 24   ANION GAP mmol/L 8 10   BUN mg/dL 35* 39*   CREATININE mg/dL 1.15 1.06   EGFR ml/min/1.73sq m 70 77   CALCIUM mg/dL 8.8 9.8     Results from last 7 days   Lab Units 09/27/24  0542 09/26/24  1155   AST U/L 10* 12*   ALT U/L 11 14   ALK PHOS U/L 87 110*   TOTAL PROTEIN g/dL 7.4 8.9*   ALBUMIN g/dL 3.6 4.5   TOTAL BILIRUBIN mg/dL 0.48 0.76     Results from last 7 days   Lab Units 09/27/24  0728 09/26/24  2045 09/26/24  1605   POC GLUCOSE mg/dl 163* 260* 301*     Results from last 7 days   Lab Units 09/27/24  0542 09/26/24  1155   GLUCOSE RANDOM mg/dL 140 233*       Results from last 7 days   Lab Units 09/26/24  1155   PROTIME seconds 13.9   INR  1.00   PTT seconds 35*         Results from last 7 days   Lab Units 09/26/24  1155   PROCALCITONIN ng/ml 0.07     Results from last 7 days   Lab Units 09/26/24  1155   LACTIC ACID mmol/L 1.4     Results from last 7 days   Lab Units 09/26/24  1155   CRP mg/L 14.3*   SED RATE mm/hour 76*       Results from last 7 days   Lab Units 09/26/24  1200 09/26/24  1155   BLOOD CULTURE  Received in Microbiology Lab. Culture in Progress. Received in Microbiology Lab. Culture in Progress.         Past Medical History:   Diagnosis Date    Allergic     Arthritis     CHF (congestive heart failure) (HCC)     Coronary artery disease     Diabetes mellitus (HCC)     Diabetic foot ulcers (HCC)     Hypertension     Moderate aortic stenosis     MRSA exposure      Present on Admission:   Type 2 diabetes mellitus with hyperglycemia (HCC)   Essential hypertension   CAD (coronary artery disease)      Admitting Diagnosis: Wound infection [T14.8XXA, L08.9]  Visit for wound check [Z51.89]  Age/Sex: 59 y.o. male    Network Utilization Review Department  ATTENTION: Please call with any questions or concerns to [phone] and carefully listen to the prompts so that you are directed to the right  person. All voicemails are confidential.   For Discharge needs, contact Care Management DC Support Team at [phone] opt. 2  Send all requests for admission clinical reviews, approved or denied determinations and any other requests to dedicated fax number below belonging to the campus where the patient is receiving treatment. List of dedicated fax numbers for the Facilities:  FACILITY NAME UR FAX NUMBER   ADMISSION DENIALS (Administrative/Medical Necessity) [phone]   DISCHARGE SUPPORT TEAM (NETWORK) [phone]   PARENT CHILD HEALTH (Maternity/NICU/Pediatrics) [phone]   [facility] [phone]   [facility] [phone]   [facility] [phone]   [facility] [phone]   [facility] [phone]   [facility] [phone]   [facility] [phone]   [facility] [phone]   [facility] [phone]   [facility] [phone]   [facility] [phone]   [facility] [phone]

## 2024-09-27 NOTE — PLAN OF CARE
Problem: PAIN - ADULT  Goal: Verbalizes/displays adequate comfort level or baseline comfort level  Description: Interventions:  - Encourage patient to monitor pain and request assistance  - Assess pain using appropriate pain scale  - Administer analgesics based on type and severity of pain and evaluate response  - Implement non-pharmacological measures as appropriate and evaluate response  - Consider cultural and social influences on pain and pain management  - Notify physician/advanced practitioner if interventions unsuccessful or patient reports new pain  Outcome: Progressing     Problem: SAFETY ADULT  Goal: Patient will remain free of falls  Description: INTERVENTIONS:  - Educate patient/family on patient safety including physical limitations  - Instruct patient to call for assistance with activity   - Consult OT/PT to assist with strengthening/mobility   - Keep Call bell within reach  - Keep bed low and locked with side rails adjusted as appropriate  - Keep care items and personal belongings within reach  - Initiate and maintain comfort rounds  - Make Fall Risk Sign visible to staff  - Offer Toileting every 2 Hours, in advance of need  - Initiate/Maintain  bed alarm  - Obtain necessary fall risk management equipment:   - Apply yellow socks and bracelet for high fall risk patients  - Consider moving patient to room near nurses station  Outcome: Progressing     Problem: DISCHARGE PLANNING  Goal: Discharge to home or other facility with appropriate resources  Description: INTERVENTIONS:  - Identify barriers to discharge w/patient and caregiver  - Arrange for needed discharge resources and transportation as appropriate  - Identify discharge learning needs (meds, wound care, etc.)  - Arrange for interpretive services to assist at discharge as needed  - Refer to Case Management Department for coordinating discharge planning if the patient needs post-hospital services based on physician/advanced practitioner order or  complex needs related to functional status, cognitive ability, or social support system  Outcome: Progressing     Problem: Knowledge Deficit  Goal: Patient/family/caregiver demonstrates understanding of disease process, treatment plan, medications, and discharge instructions  Description: Complete learning assessment and assess knowledge base.  Interventions:  - Provide teaching at level of understanding  - Provide teaching via preferred learning methods  Outcome: Progressing

## 2024-09-27 NOTE — ANESTHESIA POSTPROCEDURE EVALUATION
Post-Op Assessment Note    CV Status:  Stable  Pain Score: 0    Pain management: adequate       Mental Status:  Sleepy   Hydration Status:  Stable   PONV Controlled:  None   Airway Patency:  Patent     Post Op Vitals Reviewed: Yes    No anethesia notable event occurred.    Staff: CRNA               BP   93/56   Temp   98   Pulse  60   Resp   16   SpO2   99

## 2024-09-27 NOTE — ASSESSMENT & PLAN NOTE
Most recent A1c 9.8 indicating poor glycemic control  Hold PTA Trulicity  18 units Lantus nightly and SSI ordered  Monitor Accu-Cheks closely  Avoid hypoglycemia  Lab Results   Component Value Date    HGBA1C 9.8 (H) 09/01/2024       Recent Labs     09/26/24  1605 09/26/24  2045 09/27/24  0728 09/27/24  1154   POCGLU 301* 260* 163* 140       Blood Sugar Average: Last 72 hrs:  (P) 216

## 2024-09-28 LAB
ANION GAP SERPL CALCULATED.3IONS-SCNC: 7 MMOL/L (ref 4–13)
BUN SERPL-MCNC: 28 MG/DL (ref 5–25)
CALCIUM SERPL-MCNC: 8.8 MG/DL (ref 8.4–10.2)
CHLORIDE SERPL-SCNC: 103 MMOL/L (ref 96–108)
CO2 SERPL-SCNC: 24 MMOL/L (ref 21–32)
CREAT SERPL-MCNC: 1.02 MG/DL (ref 0.6–1.3)
ERYTHROCYTE [DISTWIDTH] IN BLOOD BY AUTOMATED COUNT: 14.2 % (ref 11.6–15.1)
GFR SERPL CREATININE-BSD FRML MDRD: 81 ML/MIN/1.73SQ M
GLUCOSE SERPL-MCNC: 143 MG/DL (ref 65–140)
GLUCOSE SERPL-MCNC: 149 MG/DL (ref 65–140)
GLUCOSE SERPL-MCNC: 155 MG/DL (ref 65–140)
GLUCOSE SERPL-MCNC: 181 MG/DL (ref 65–140)
GLUCOSE SERPL-MCNC: 261 MG/DL (ref 65–140)
HCT VFR BLD AUTO: 38.9 % (ref 36.5–49.3)
HGB BLD-MCNC: 12.2 G/DL (ref 12–17)
MCH RBC QN AUTO: 26.9 PG (ref 26.8–34.3)
MCHC RBC AUTO-ENTMCNC: 31.4 G/DL (ref 31.4–37.4)
MCV RBC AUTO: 86 FL (ref 82–98)
PLATELET # BLD AUTO: 313 THOUSANDS/UL (ref 149–390)
PMV BLD AUTO: 10.5 FL (ref 8.9–12.7)
POTASSIUM SERPL-SCNC: 4.1 MMOL/L (ref 3.5–5.3)
RBC # BLD AUTO: 4.53 MILLION/UL (ref 3.88–5.62)
SODIUM SERPL-SCNC: 134 MMOL/L (ref 135–147)
WBC # BLD AUTO: 10.24 THOUSAND/UL (ref 4.31–10.16)

## 2024-09-28 PROCEDURE — 85027 COMPLETE CBC AUTOMATED: CPT | Performed by: INTERNAL MEDICINE

## 2024-09-28 PROCEDURE — 99254 IP/OBS CNSLTJ NEW/EST MOD 60: CPT | Performed by: INTERNAL MEDICINE

## 2024-09-28 PROCEDURE — 80048 BASIC METABOLIC PNL TOTAL CA: CPT | Performed by: INTERNAL MEDICINE

## 2024-09-28 PROCEDURE — 82948 REAGENT STRIP/BLOOD GLUCOSE: CPT

## 2024-09-28 PROCEDURE — 99232 SBSQ HOSP IP/OBS MODERATE 35: CPT | Performed by: INTERNAL MEDICINE

## 2024-09-28 RX ORDER — ENOXAPARIN SODIUM 100 MG/ML
40 INJECTION SUBCUTANEOUS
Status: DISCONTINUED | OUTPATIENT
Start: 2024-09-29 | End: 2024-10-01 | Stop reason: HOSPADM

## 2024-09-28 RX ADMIN — OXYCODONE HYDROCHLORIDE 5 MG: 5 TABLET ORAL at 18:30

## 2024-09-28 RX ADMIN — INSULIN GLARGINE 18 UNITS: 100 INJECTION, SOLUTION SUBCUTANEOUS at 21:11

## 2024-09-28 RX ADMIN — ATORVASTATIN CALCIUM 40 MG: 40 TABLET, FILM COATED ORAL at 09:15

## 2024-09-28 RX ADMIN — INSULIN LISPRO 1 UNITS: 100 INJECTION, SOLUTION INTRAVENOUS; SUBCUTANEOUS at 17:35

## 2024-09-28 RX ADMIN — OXYCODONE HYDROCHLORIDE 5 MG: 5 TABLET ORAL at 22:33

## 2024-09-28 RX ADMIN — CEFAZOLIN SODIUM 2000 MG: 2 SOLUTION INTRAVENOUS at 15:58

## 2024-09-28 RX ADMIN — ACETAMINOPHEN 650 MG: 325 TABLET, FILM COATED ORAL at 18:30

## 2024-09-28 RX ADMIN — CEFEPIME 2000 MG: 2 INJECTION, POWDER, FOR SOLUTION INTRAVENOUS at 19:21

## 2024-09-28 RX ADMIN — ASPIRIN 81 MG: 81 TABLET, COATED ORAL at 09:15

## 2024-09-28 RX ADMIN — METOPROLOL SUCCINATE 50 MG: 50 TABLET, EXTENDED RELEASE ORAL at 09:15

## 2024-09-28 RX ADMIN — CLOPIDOGREL 75 MG: 75 TABLET ORAL at 09:15

## 2024-09-28 RX ADMIN — OXYCODONE HYDROCHLORIDE 5 MG: 5 TABLET ORAL at 11:27

## 2024-09-28 RX ADMIN — FUROSEMIDE 40 MG: 40 TABLET ORAL at 09:15

## 2024-09-28 RX ADMIN — CEFAZOLIN SODIUM 2000 MG: 2 SOLUTION INTRAVENOUS at 05:20

## 2024-09-28 RX ADMIN — INSULIN LISPRO 1 UNITS: 100 INJECTION, SOLUTION INTRAVENOUS; SUBCUTANEOUS at 13:05

## 2024-09-28 NOTE — PROGRESS NOTES
Progress Note - Hospitalist   Name: Zain Gayle 59 y.o. male I MRN: [id number]  Unit/Bed#: -01 I Date of Admission: 9/26/2024   Date of Service: 9/28/2024 I Hospital Day: 0    Addendum: Wound cultures demonstrating pseudomonas aeruginosa.  Change cefazolin to cefepime. ID consulted for Abx management.   Assessment & Plan  Diabetic foot ulcer (HCC)  Patient presents to ED from wound care center with wound dehiscence and bone visualization of left hallux amputation site.  Denies fever, chills, or pain, although patient is neuropathic.  Denies complications to right amputation site  Vital signs stable and patient afebrile, mild leukocytosis 11.54.  Lactic acid WNL.  ESR 76, CRP 14.3  X-ray left foot revealing soft tissue ulceration overlying the region of the prior great toe amputation. No evidence of osteomyelitis of the first metatarsal head. No soft tissue gas   S/p Ceftriaxone and vancomycin in ED  MRI ordered. Reviewed.   Continue to monitor CBC/BMP  S/p Left foot partial 1st ray resection (Incision of bone cortex for osteomyelitis, Sesamoidectomy and Left foot abscess incision & drainage on 9/27. Per podiatry, surgical cure achieved.   Started IV cefazolin post OR. Follow up cultures     Lab Results   Component Value Date    HGBA1C 9.8 (H) 09/01/2024       Recent Labs     09/27/24  1154 09/27/24  1513 09/27/24 2019 09/28/24  0805   POCGLU 140 151* 182* 143*       Blood Sugar Average: Last 72 hrs:  (P) 191.9254460629669208    Type 2 diabetes mellitus with hyperglycemia (HCC)  Most recent A1c 9.8 indicating poor glycemic control  Hold PTA Trulicity  18 units Lantus nightly and SSI ordered  Monitor Accu-Cheks closely  Avoid hypoglycemia  Lab Results   Component Value Date    HGBA1C 9.8 (H) 09/01/2024       Recent Labs     09/27/24  1154 09/27/24  1513 09/27/24 2019 09/28/24  0805   POCGLU 140 151* 182* 143*       Blood Sugar Average: Last 72 hrs:  (P) 191.3858441293535579    Essential hypertension  Monitor  BP  CAD (coronary artery disease)  Patient with history of CAD s/p stents x 2  Patient currently without chest pain or shortness of breath  Continue PTA statin  Resume DAPT   CHF (congestive heart failure) (HCC)  Patient euvolemic on exam  Most recent echo 2024 revealing LVEF 55%, no wall motion abnormality, nodiastolic dysfunction  Continue PTA Lasix  Wt Readings from Last 3 Encounters:   24 95.7 kg (211 lb)   24 95.8 kg (211 lb 3.2 oz)   10/05/21 104 kg (229 lb)               VTE Pharmacologic Prophylaxis: VTE Score: 5 Moderate Risk (Score 3-4) - Pharmacological DVT Prophylaxis Ordered: enoxaparin (Lovenox).    Mobility:   Basic Mobility Inpatient Raw Score: 24  JH-HLM Goal: 8: Walk 250 feet or more  JH-HLM Achieved: 7: Walk 25 feet or more  JH-HLM Goal NOT achieved. Continue with multidisciplinary rounding and encourage appropriate mobility to improve upon JH-HLM goals.    Patient Centered Rounds: I performed bedside rounds with nursing staff today.   Discussions with Specialists or Other Care Team Provider: podiatry     Education and Discussions with Family / Patient: Patient declined call to .     Current Length of Stay: 0 day(s)  Current Patient Status: Observation   Certification Statement: The patient will continue to require additional inpatient hospital stay due to IV abx, cx  Discharge Plan: Anticipate discharge in 48-72 hrs to discharge location to be determined pending rehab evaluations.    Code Status: Level 1 - Full Code    Subjective   Had severe pain overnight but doing better now.     Objective     Vitals:   Temp (24hrs), Av.2 °F (36.8 °C), Min:97.7 °F (36.5 °C), Max:98.6 °F (37 °C)    Temp:  [97.7 °F (36.5 °C)-98.6 °F (37 °C)] 98.5 °F (36.9 °C)  HR:  [60-69] 64  Resp:  [18] 18  BP: ()/(57-94) 127/67  SpO2:  [95 %-98 %] 97 %  Body mass index is 29.02 kg/m².     Input and Output Summary (last 24 hours):     Intake/Output Summary (Last 24 hours) at  9/28/2024 0911  Last data filed at 9/28/2024 0532  Gross per 24 hour   Intake 1905 ml   Output 3200 ml   Net -1295 ml       Physical Exam  Constitutional:       Appearance: Normal appearance.   Cardiovascular:      Rate and Rhythm: Normal rate and regular rhythm.   Pulmonary:      Effort: Pulmonary effort is normal.      Breath sounds: Normal breath sounds.   Abdominal:      General: Bowel sounds are normal.      Palpations: Abdomen is soft.   Skin:     General: Skin is warm.   Neurological:      General: No focal deficit present.      Mental Status: He is alert and oriented to person, place, and time.          Lines/Drains:  Lines/Drains/Airways       Active Status       None                            Lab Results: I have reviewed the following results: CBC/BMP:   .     09/28/24  0525   WBC 10.24*   HGB 12.2   HCT 38.9      SODIUM 134*   K 4.1      CO2 24   BUN 28*   CREATININE 1.02   GLUC 149*       Results from last 7 days   Lab Units 09/28/24  0525 09/27/24  0542 09/26/24  1155   WBC Thousand/uL 10.24*   < > 11.54*   HEMOGLOBIN g/dL 12.2   < > 14.8   HEMATOCRIT % 38.9   < > 46.0   PLATELETS Thousands/uL 313   < > 408*   SEGS PCT %  --   --  75   LYMPHO PCT %  --   --  17   MONO PCT %  --   --  5   EOS PCT %  --   --  1    < > = values in this interval not displayed.     Results from last 7 days   Lab Units 09/28/24  0525 09/27/24  0542   SODIUM mmol/L 134* 137   POTASSIUM mmol/L 4.1 3.9   CHLORIDE mmol/L 103 104   CO2 mmol/L 24 25   BUN mg/dL 28* 35*   CREATININE mg/dL 1.02 1.15   ANION GAP mmol/L 7 8   CALCIUM mg/dL 8.8 8.8   ALBUMIN g/dL  --  3.6   TOTAL BILIRUBIN mg/dL  --  0.48   ALK PHOS U/L  --  87   ALT U/L  --  11   AST U/L  --  10*   GLUCOSE RANDOM mg/dL 149* 140     Results from last 7 days   Lab Units 09/26/24  1155   INR  1.00     Results from last 7 days   Lab Units 09/28/24  0805 09/27/24 2019 09/27/24  1513 09/27/24  1154 09/27/24  0728 09/26/24  2045 09/26/24  1605   POC GLUCOSE  mg/dl 143* 182* 151* 140 163* 260* 301*         Results from last 7 days   Lab Units 09/26/24  1155   LACTIC ACID mmol/L 1.4   PROCALCITONIN ng/ml 0.07       Recent Cultures (last 7 days):   Results from last 7 days   Lab Units 09/27/24  1125 09/26/24  1200 09/26/24  1155   BLOOD CULTURE   --  No Growth at 24 hrs. No Growth at 24 hrs.   GRAM STAIN RESULT  Rare Polys  No bacteria seen  --   --        Imaging Review: Reviewed radiology reports from this admission including: procedure reports.  Other Studies: No additional pertinent studies reviewed.    Last 24 Hours Medication List:     Current Facility-Administered Medications:     acetaminophen (TYLENOL) tablet 650 mg, Q6H PRN    aspirin (ECOTRIN LOW STRENGTH) EC tablet 81 mg, Daily    atorvastatin (LIPITOR) tablet 40 mg, Daily    ceFAZolin (ANCEF) IVPB (premix in dextrose) 2,000 mg 50 mL, Q8H, Last Rate: 2,000 mg (09/28/24 0520)    clopidogrel (PLAVIX) tablet 75 mg, Daily    furosemide (LASIX) tablet 40 mg, Daily    insulin glargine (LANTUS) subcutaneous injection 18 Units 0.18 mL, HS    insulin lispro (HumALOG/ADMELOG) 100 units/mL subcutaneous injection 1-6 Units, TID AC **AND** [CANCELED] Fingerstick Glucose (POCT), TID AC    metoprolol succinate (TOPROL-XL) 24 hr tablet 50 mg, Daily    ondansetron (ZOFRAN) injection 4 mg, Q6H PRN    oxyCODONE (ROXICODONE) split tablet 2.5 mg, Q4H PRN **OR** oxyCODONE (ROXICODONE) IR tablet 5 mg, Q4H PRN    Administrative Statements   Today, Patient Was Seen By: Familia Blackwood MD  I have spent a total time of 25 minutes in caring for this patient on the day of the visit/encounter including Diagnostic results, Patient and family education, Impressions, Counseling / Coordination of care, Documenting in the medical record, Reviewing / ordering tests, medicine, procedures  , Obtaining or reviewing history  , and Communicating with other healthcare professionals .    **Please Note: This note may have been constructed using a voice  recognition system.**

## 2024-09-28 NOTE — ASSESSMENT & PLAN NOTE
Patient with history of CAD s/p stents x 2  Patient currently without chest pain or shortness of breath  Continue PTA statin  Resume DAPT

## 2024-09-28 NOTE — PLAN OF CARE
Problem: INFECTION - ADULT  Goal: Absence or prevention of progression during hospitalization  Description: INTERVENTIONS:  - Assess and monitor for signs and symptoms of infection  - Monitor lab/diagnostic results  - Monitor all insertion sites, i.e. indwelling lines, tubes, and drains  - Monitor endotracheal if appropriate and nasal secretions for changes in amount and color  - Pipestem appropriate cooling/warming therapies per order  - Administer medications as ordered  - Instruct and encourage patient and family to use good hand hygiene technique  - Identify and instruct in appropriate isolation precautions for identified infection/condition  Outcome: Progressing

## 2024-09-28 NOTE — ASSESSMENT & PLAN NOTE
Most recent A1c 9.8 indicating poor glycemic control  Hold PTA Trulicity  18 units Lantus nightly and SSI ordered  Monitor Accu-Cheks closely  Avoid hypoglycemia  Lab Results   Component Value Date    HGBA1C 9.8 (H) 09/01/2024       Recent Labs     09/27/24  1154 09/27/24  1513 09/27/24 2019 09/28/24  0805   POCGLU 140 151* 182* 143*       Blood Sugar Average: Last 72 hrs:  (P) 191.9609594910313962

## 2024-09-28 NOTE — ASSESSMENT & PLAN NOTE
Patient presents to ED from wound care center with wound dehiscence and bone visualization of left hallux amputation site.  Denies fever, chills, or pain, although patient is neuropathic.  Denies complications to right amputation site  Vital signs stable and patient afebrile, mild leukocytosis 11.54.  Lactic acid WNL.  ESR 76, CRP 14.3  X-ray left foot revealing soft tissue ulceration overlying the region of the prior great toe amputation. No evidence of osteomyelitis of the first metatarsal head. No soft tissue gas   S/p Ceftriaxone and vancomycin in ED  MRI ordered. Reviewed.   Continue to monitor CBC/BMP  S/p Left foot partial 1st ray resection (Incision of bone cortex for osteomyelitis, Sesamoidectomy and Left foot abscess incision & drainage on 9/27. Per podiatry, surgical cure achieved.   Started IV cefazolin post OR. Follow up cultures     Lab Results   Component Value Date    HGBA1C 9.8 (H) 09/01/2024       Recent Labs     09/27/24  1154 09/27/24  1513 09/27/24 2019 09/28/24  0805   POCGLU 140 151* 182* 143*       Blood Sugar Average: Last 72 hrs:  (P) [id number]

## 2024-09-28 NOTE — CONSULTS
Consultation - Infectious Disease   Zain Gayle 59 y.o. male MRN: [id number]  Unit/Bed#: -01 Encounter: [id number]      IMPRESSION & RECOMMENDATIONS:   Impression/Recommendations:  This is a 59 y.o. male, with multiple medical problems including DM, status post left hallux amputation in early September of osteomyelitis, readmitted on 9/26 with amputation wound dehiscence, with cellulitis and osteomyelitis of first metatarsal head.  Patient is status post I&D and metatarsal head resection for surgical cure on 9/27.  Operative culture with growth of Pseudomonas.    1.  Left foot cellulitis, secondary to left hallux amputation wound dehiscence.  Patient has mild leukocytosis but no fever.  He is clinically and systemically well, without sepsis or systemic toxicity.  Operative culture from 9/27 is growing Pseudomonas.  Admission blood cultures have no growth thus far.  Continue IV cefepime.  Follow-up on operative culture.  Treat x 7-day antibiotic course.  If Pseudomonas isolate is susceptible to fluoroquinolone, transition to Levaquin.    2.  Left first metatarsal head osteomyelitis, secondary to left hallux amputation wound dehiscence and exposed metatarsal head.  Patient is status post resection of first metatarsal head.  Per discussion with podiatrist, this was a surgical cure.  No antibiotic needed for this indication.  Wound care per podiatry.    3.  DM, poorly controlled, with hyperglycemia and elevated hemoglobin A1c.  This contributes to poor wound healing and recurrent infection above.  Management per primary service.    Prior hospitalization records reviewed in detail.  Discussed with patient in detail regarding the above plan.  Discussed with Dr. Blackwood from primary service regarding antibiotic plan above.  He is in agreement.    Thank you for this consultation.  We will follow along with you.    HISTORY OF PRESENT ILLNESS:  Reason for Consult: Right foot cellulitis and osteomyelitis of first  metatarsal head.    HPI: Zain Gayle is a 59 y.o. male, with multiple medical problems including DM, CHF, CAD status post stent, status post recent left hallux amputation, was referred to ER on 9/26 from wound care clinic due to wound dehiscence with exposed bone and yellow-green drainage.  On presentation, patient had mild leukocytosis but no fever.  He was admitted and was started on ceftriaxone/vancomycin initially, transition to cefazolin.  MRI showed osteomyelitis of first metatarsal head.  Patient was taken the OR yesterday with undergo resection of first metatarsal head.  Today, operative culture with growth of Pseudomonas.  Antibiotic was changed to IV cefepime.  We are asked to evaluate the patient.      At present, patient has mild pain in his foot, well-controlled.  He has no fever or chills.    Per record review, during admission in early September, patient had left hallux amputation, with right third and fourth digit amputation with right third metatarsal resection for osteomyelitis.    REVIEW OF SYSTEMS:  A complete system-based review was done.  Except for what is noted in HPI above, ROS of systems is otherwise negative.    PAST MEDICAL HISTORY:  Past Medical History:   Diagnosis Date    Allergic     Arthritis     CHF (congestive heart failure) (HCC)     Coronary artery disease     Diabetes mellitus (HCC)     Diabetic foot ulcers (HCC)     Hypertension     Moderate aortic stenosis     MRSA exposure      Past Surgical History:   Procedure Laterality Date    CARPAL TUNNEL RELEASE      KNEE SURGERY      AR AMPUTATION METATARSAL W/TOE SINGLE Bilateral 9/6/2024    Procedure: RIGHT FOOT PARTIAL 3RD RAY RESECTION, 4TH AND 5TH DIGIT AMPUTATION, LEFT HALLUX AMPUTATION;  Surgeon: Robb Kim DPM;  Location: [facility];  Service: Podiatry    AR AMPUTATION METATARSAL W/TOE SINGLE Left 9/27/2024    Procedure: left foot first ray resection;  Surgeon: Robb Kim DPM;  Location: [facility];  Service:  Podiatry    TOE AMPUTATION       Problem list reviewed.    FAMILY HISTORY:  Non-contributory    SOCIAL HISTORY:  Social History     Substance and Sexual Activity   Alcohol Use Not Currently     Social History     Substance and Sexual Activity   Drug Use Not on file     Social History     Tobacco Use   Smoking Status Former   Smokeless Tobacco Never       ALLERGIES:  No Known Allergies    MEDICATIONS:  All current active medications have been reviewed.  Patient is currently on IV cefepime.    PHYSICAL EXAM:  Vitals:  Temp:  [98.5 °F (36.9 °C)-98.6 °F (37 °C)] 98.5 °F (36.9 °C)  HR:  [60-66] 66  Resp:  [18-20] 20  BP: (116-127)/(67-68) 126/67  SpO2:  [95 %-97 %] 96 %  Temp (24hrs), Av.6 °F (37 °C), Min:98.5 °F (36.9 °C), Max:98.6 °F (37 °C)  Current: Temperature: 98.5 °F (36.9 °C)     Physical Exam:  General:  Well-nourished, well-developed, in no acute distress. Awake, alert and oriented x 3.  Eyes:  Conjunctive clear with no hemorrhages or effusions  Oropharynx:  No ulcers, no lesions, pharynx benign, no tonsillitis  Neck:  Supple, no lymphadenopathy, no mass, nontender  Lungs:  Expansion symmetric, no rales, no wheezing, no accessory muscle use  Cardiac:  Regular rate and rhythm, normal S1, normal S2, no murmurs  Abdomen:  Soft, nondistended, non-tender, no HSM  Extremities: Trace foot edema.  Left foot with dressing in place.  Dressing is dry.  No erythema/warm beyond dressing.  Mild tenderness.  Skin:  No rashes, no ulcers  Neurological:  Moves all four extremities spontaneously, sensation grossly intact    LABS, IMAGING, & OTHER STUDIES:  Lab Results:  I have personally reviewed pertinent labs.  Results from last 7 days   Lab Units 24  0525 24  0542 24  1155   POTASSIUM mmol/L 4.1 3.9 4.2   CHLORIDE mmol/L 103 104 102   CO2 mmol/L 24 25 24   BUN mg/dL 28* 35* 39*   CREATININE mg/dL 1.02 1.15 1.06   EGFR ml/min/1.73sq m 81 70 77   CALCIUM mg/dL 8.8 8.8 9.8   AST U/L  --  10* 12*   ALT U/L   --  11 14   ALK PHOS U/L  --  87 110*     Results from last 7 days   Lab Units 09/28/24  0525 09/27/24  0542 09/26/24  1155   WBC Thousand/uL 10.24* 10.07 11.54*   HEMOGLOBIN g/dL 12.2 12.3 14.8   PLATELETS Thousands/uL 313 320 408*     Results from last 7 days   Lab Units 09/27/24  1125 09/26/24  1200 09/26/24  1155   BLOOD CULTURE   --  No Growth at 48 hrs. No Growth at 48 hrs.   GRAM STAIN RESULT  Rare Polys  No bacteria seen  --   --    WOUND CULTURE  1+ Growth of Pseudomonas aeruginosa*  --   --        Imaging Studies:   I have personally reviewed pertinent imaging study reports and images in PACS.  Right foot x-ray reviewed personally.  No obvious osteomyelitis.  Right foot MRI reviewed personally.  Osteomyelitis of distal first metatarsal head.    EKG, Pathology, and Other Studies:   I have personally reviewed pertinent reports.

## 2024-09-28 NOTE — UTILIZATION REVIEW
Admitted as OBS 9/26/24, to OR 9/27/24, converted to Inpatient 9/28/24 for continued treatment with IV antibiotics, operative wound cultures positive for pseudomonas aeruginosa.    Admission Orders (From admission, onward)       Ordered        09/28/24 0914  INPATIENT ADMISSION  Once            09/26/24 1328  Place in Observation  Once                             Orders Placed This Encounter   Procedures    INPATIENT ADMISSION     Standing Status:   Standing     Number of Occurrences:   1     Order Specific Question:   Level of Care     Answer:   Med Surg [16]     Order Specific Question:   Estimated length of stay     Answer:   More than 2 Midnights     Order Specific Question:   Certification     Answer:   I certify that inpatient services are medically necessary for this patient for a duration of greater than two midnights. See H&P and MD Progress Notes for additional information about the patient's course of treatment.       SEE INITIAL OBS REVIEW AT BOTTOM      Continued Stay Review    Date: 9/28-9/29/24                   Current Patient Class: Inpatient  Current Level of Care: Med Surg    HPI:59 y.o. male initially admitted on 9/26/24 with diabetic foot ulcer. MRI suspicious for early osteomyelitis. To OR 9/27 for Left foot partial 1st ray resection (Incision of bone cortex for osteomyelitis) Sesamoidectomy.  Left foot abscess incision & drainage.    9/28 Internal Medicine:  Patient had severe pain overnight, but better now.  Most recent A1c 9.8 indicating poor glycemic control. Hold PTA Trulicity, 18 units Lantus nightly and SSI, monitor Acc-checks. Continue to monitor CMP, BMP. Infectious Disease consult:  Patient is status post I&D and metatarsal head resection for surgical cure on 9/27.  Operative culture with growth of Pseudomonas.  Continue IV cefepime. Follow-up on operative culture.  Treat x 7-day antibiotic course. If Pseudomonas isolate is susceptible to fluoroquinolone, transition to Levaquin. DM,  poorly controlled, with hyperglycemia and elevated hemoglobin A1c. This contributes to poor wound healing and recurrent infection. Management per primary service.     9/29 Podiatry:  Left foot surgical wound sutures intact. No dehiscenses. Wound culture final result pending: growing Pseudo. Abx per medicine recs following final culture. Dressed B/L feet wounds today with dry sterile dressings. Next change for wounds can take place in q48 hours. Will place nursing order for this. Right foot examined and submet 3 ulcer is clinically stable. All other incision sites are healing.  WBAT on B/L feet in the post op shoes. Infectious Disease:  Pain in left foot is improved. Temperature stays down.  Pseudomonas is intermittently susceptible to cefepime but fully susceptible to fluoroquinolones.  Admission blood cultures have no growth thus far. Change antibiotic to p.o. Levaquin. Treat x 7 day Levaquin course.    Medications:   Scheduled Medications:  aspirin, 81 mg, Oral, Daily  atorvastatin, 40 mg, Oral, Daily  cefazolin, 2,000 mg, Intravenous, Q8H  clopidogrel, 75 mg, Oral, Daily  [START ON 9/29/2024] enoxaparin, 40 mg, Subcutaneous, Q24H CHRISTIANO  furosemide, 40 mg, Oral, Daily  insulin glargine, 18 Units, Subcutaneous, HS  insulin lispro, 1-6 Units, Subcutaneous, TID AC  metoprolol succinate, 50 mg, Oral, Daily    levofloxacin (LEVAQUIN) tablet 750 mg  Dose: 750 mg  Freq: Every 24 hours Route: PO  Start: 09/29/24 1000    cefepime (MAXIPIME) 2 g/50 mL dextrose IVPB x 2 doses  Dose: 2,000 mg  Freq: Every 8 hours Route: IV  Last Dose: 2,000 mg (09/29/24 0215)  Start: 09/28/24 1615 End: 09/29/24 0949    ceFAZolin (ANCEF) IVPB (premix in dextrose) 2,000 mg 50 mL x 4 doses  Dose: 2,000 mg  Freq: Every 8 hours Route: IV  Last Dose: Stopped (09/28/24 1700)  Start: 09/27/24 1415 End: 09/28/24 1611    Continuous IV Infusions: None.      PRN Meds:    acetaminophen, 650 mg, Oral, Q6H PRN  ondansetron, 4 mg, Intravenous, Q6H  PRN  oxyCODONE, 2.5 mg, Oral, Q4H PRN   Or  oxyCODONE, 5 mg, Oral, Q4H PRN x 1 dose 9/27, x 3 doses 9/28, x 2 doses 9/29 thus far      Discharge Plan: TBD        Vital Signs (last 3 days)       Date/Time Temp Pulse Resp BP MAP (mmHg) SpO2 O2 Device Pain    09/29/24 07:57:32 98.7 °F (37.1 °C) 65 -- 124/98 107 97 % None (Room air) --    09/29/24 0740 -- -- -- -- -- -- -- 2    09/29/24 0622 -- -- -- -- -- -- -- 8 09/29/24 0220 -- -- -- -- -- -- -- 8 09/28/24 22:50:10 97.9 °F (36.6 °C) 60 -- 150/75 100 99 % -- --    09/28/24 2233 -- -- -- -- -- -- -- 8 09/28/24 1945 -- -- -- -- -- -- -- No Pain    09/28/24 1830 -- -- -- -- -- -- -- 9    09/28/24 11:28:51 -- 66 20 126/67 87 96 % None (Room air) --    09/28/24 1127 -- -- -- -- -- -- -- 10 - Worst Possible Pain    09/28/24 07:47:12 98.5 °F (36.9 °C) 64 -- 127/67 87 97 % -- --    09/27/24 2054 -- -- -- -- -- -- -- 8 09/27/24 1925 98.6 °F (37 °C) 60 18 116/68 84 95 % None (Room air) No Pain    09/27/24 12:41:39 -- 60 -- 114/81 92 98 % -- --    09/27/24 1200 -- 69 18 103/70 82 95 % None (Room air) No Pain    09/27/24 1147 97.8 °F (36.6 °C) 64 18 93/57 70 98 % None (Room air) --    09/27/24 1019 97.7 °F (36.5 °C) 64 18 131/94 -- 97 % None (Room air) No Pain    09/27/24 0800 -- -- -- -- -- 97 % None (Room air) No Pain    09/27/24 07:28:23 -- 71 -- 114/70 85 95 % -- --    09/26/24 2301 -- -- -- -- -- 94 % None (Room air) No Pain    09/26/24 21:27:05 98.4 °F (36.9 °C) 68 18 101/60 74 96 % -- --    09/26/24 1651 -- 70 -- -- -- 97 % None (Room air) No Pain    09/26/24 15:40:39 98.1 °F (36.7 °C) 77 18 91/53 66 97 % -- --    09/26/24 1426 -- 87 14 140/85 -- 96 % None (Room air) --    09/26/24 1307 -- 81 16 87/54 -- 95 % None (Room air) --    09/26/24 1306 -- -- 16 84/50 -- 96 % None (Room air) --    09/26/24 1113 97.5 °F (36.4 °C) 99 22 117/86 -- 99 % None (Room air) --          Weight (last 2 days)       Date/Time Weight    09/29/24 0220 95.7 (210.98)             Pertinent Labs/Diagnostic Results:       Radiology:    XR foot 3+ vw left   Final Interpretation by Dane Floyd MD (09/27 1633)   Status post first transmetatarsal amputation.         Cardiology:      Results from last 7 days   Lab Units 09/29/24  0426 09/28/24  0525 09/27/24  0542 09/26/24  1155   WBC Thousand/uL 9.38 10.24* 10.07 11.54*   HEMOGLOBIN g/dL 11.9* 12.2 12.3 14.8   HEMATOCRIT % 36.5 38.9 38.5 46.0   PLATELETS Thousands/uL 257 313 320 408*   TOTAL NEUT ABS Thousands/µL  --   --   --  8.71*         Results from last 7 days   Lab Units 09/29/24 0426 09/28/24 0525 09/27/24  0542 09/26/24  1155   SODIUM mmol/L 133* 134* 137 136   POTASSIUM mmol/L 3.9 4.1 3.9 4.2   CHLORIDE mmol/L 102 103 104 102   CO2 mmol/L 24 24 25 24   ANION GAP mmol/L 7 7 8 10   BUN mg/dL 24 28* 35* 39*   CREATININE mg/dL 0.87 1.02 1.15 1.06   EGFR ml/min/1.73sq m 95 81 70 77   CALCIUM mg/dL 8.6 8.8 8.8 9.8     Results from last 7 days   Lab Units 09/27/24  0542 09/26/24  1155   AST U/L 10* 12*   ALT U/L 11 14   ALK PHOS U/L 87 110*   TOTAL PROTEIN g/dL 7.4 8.9*   ALBUMIN g/dL 3.6 4.5   TOTAL BILIRUBIN mg/dL 0.48 0.76     Results from last 7 days   Lab Units 09/29/24  0756 09/28/24  2041 09/28/24  1652 09/28/24  1145 09/28/24  0805 09/27/24 2019 09/27/24  1513 09/27/24  1154 09/27/24  0728 09/26/24 2045 09/26/24  1605   POC GLUCOSE mg/dl 133 261* 155* 181* 143* 182* 151* 140 163* 260* 301*     Results from last 7 days   Lab Units 09/29/24  0426 09/28/24  0525 09/27/24  0542 09/26/24  1155   GLUCOSE RANDOM mg/dL 131 149* 140 233*           Results from last 7 days   Lab Units 09/26/24  1155   PROTIME seconds 13.9   INR  1.00   PTT seconds 35*         Results from last 7 days   Lab Units 09/26/24  1155   PROCALCITONIN ng/ml 0.07     Results from last 7 days   Lab Units 09/26/24  1155   LACTIC ACID mmol/L 1.4         Results from last 7 days   Lab Units 09/26/24  1155   CRP mg/L 14.3*   SED RATE mm/hour 76*                    Results from last 7 days   Lab Units 09/27/24  1125 09/26/24  1200 09/26/24  1155   BLOOD CULTURE   --  No Growth at 48 hrs. No Growth at 48 hrs.   GRAM STAIN RESULT  Rare Polys  No bacteria seen  --   --    WOUND CULTURE  1+ Growth of Pseudomonas aeruginosa MDR*  1+ Growth of  --   --                    Network Utilization Review Department  ATTENTION: Please call with any questions or concerns to [phone] and carefully listen to the prompts so that you are directed to the right person. All voicemails are confidential.   For Discharge needs, contact Care Management DC Support Team at [phone] opt. 2  Send all requests for admission clinical reviews, approved or denied determinations and any other requests to dedicated fax number below belonging to the campus where the patient is receiving treatment. List of dedicated fax numbers for the Facilities:  FACILITY NAME UR FAX NUMBER   ADMISSION DENIALS (Administrative/Medical Necessity) [phone]   DISCHARGE SUPPORT TEAM (NETWORK) [phone]   PARENT CHILD HEALTH (Maternity/NICU/Pediatrics) [phone]   [facility] [phone]   [facility] [phone]   [facility] [phone]   [facility] [phone]   Replaced by [facility] [phone]   [facility] [phone]   [facility] [phone]   [facility] [phone]   [facility] [phone]   [facility] [phone]   [facility] [phone]   [facility] [phone]

## 2024-09-29 VITALS
OXYGEN SATURATION: 99 % | HEART RATE: 63 BPM | RESPIRATION RATE: 18 BRPM | WEIGHT: 210.98 LBS | SYSTOLIC BLOOD PRESSURE: 125 MMHG | BODY MASS INDEX: 29.54 KG/M2 | DIASTOLIC BLOOD PRESSURE: 71 MMHG | TEMPERATURE: 98.2 F | HEIGHT: 71 IN

## 2024-09-29 LAB
ANION GAP SERPL CALCULATED.3IONS-SCNC: 7 MMOL/L (ref 4–13)
BACTERIA BLD CULT: NORMAL
BACTERIA BLD CULT: NORMAL
BACTERIA SPEC ANAEROBE CULT: NORMAL
BACTERIA WND AEROBE CULT: ABNORMAL
BACTERIA WND AEROBE CULT: ABNORMAL
BUN SERPL-MCNC: 24 MG/DL (ref 5–25)
CALCIUM SERPL-MCNC: 8.6 MG/DL (ref 8.4–10.2)
CHLORIDE SERPL-SCNC: 102 MMOL/L (ref 96–108)
CO2 SERPL-SCNC: 24 MMOL/L (ref 21–32)
CREAT SERPL-MCNC: 0.87 MG/DL (ref 0.6–1.3)
ERYTHROCYTE [DISTWIDTH] IN BLOOD BY AUTOMATED COUNT: 14.3 % (ref 11.6–15.1)
GFR SERPL CREATININE-BSD FRML MDRD: 95 ML/MIN/1.73SQ M
GLUCOSE SERPL-MCNC: 131 MG/DL (ref 65–140)
GLUCOSE SERPL-MCNC: 133 MG/DL (ref 65–140)
GLUCOSE SERPL-MCNC: 144 MG/DL (ref 65–140)
GLUCOSE SERPL-MCNC: 166 MG/DL (ref 65–140)
GLUCOSE SERPL-MCNC: 228 MG/DL (ref 65–140)
GRAM STN SPEC: ABNORMAL
GRAM STN SPEC: ABNORMAL
HCT VFR BLD AUTO: 36.5 % (ref 36.5–49.3)
HGB BLD-MCNC: 11.9 G/DL (ref 12–17)
MCH RBC QN AUTO: 27.3 PG (ref 26.8–34.3)
MCHC RBC AUTO-ENTMCNC: 32.6 G/DL (ref 31.4–37.4)
MCV RBC AUTO: 84 FL (ref 82–98)
PLATELET # BLD AUTO: 257 THOUSANDS/UL (ref 149–390)
PMV BLD AUTO: 10.6 FL (ref 8.9–12.7)
POTASSIUM SERPL-SCNC: 3.9 MMOL/L (ref 3.5–5.3)
RBC # BLD AUTO: 4.36 MILLION/UL (ref 3.88–5.62)
SODIUM SERPL-SCNC: 133 MMOL/L (ref 135–147)
WBC # BLD AUTO: 9.38 THOUSAND/UL (ref 4.31–10.16)

## 2024-09-29 PROCEDURE — 80048 BASIC METABOLIC PNL TOTAL CA: CPT | Performed by: INTERNAL MEDICINE

## 2024-09-29 PROCEDURE — 99232 SBSQ HOSP IP/OBS MODERATE 35: CPT | Performed by: INTERNAL MEDICINE

## 2024-09-29 PROCEDURE — 85027 COMPLETE CBC AUTOMATED: CPT | Performed by: INTERNAL MEDICINE

## 2024-09-29 PROCEDURE — 82948 REAGENT STRIP/BLOOD GLUCOSE: CPT

## 2024-09-29 RX ADMIN — CLOPIDOGREL 75 MG: 75 TABLET ORAL at 08:13

## 2024-09-29 RX ADMIN — OXYCODONE HYDROCHLORIDE 5 MG: 5 TABLET ORAL at 20:49

## 2024-09-29 RX ADMIN — INSULIN LISPRO 1 UNITS: 100 INJECTION, SOLUTION INTRAVENOUS; SUBCUTANEOUS at 16:27

## 2024-09-29 RX ADMIN — ATORVASTATIN CALCIUM 40 MG: 40 TABLET, FILM COATED ORAL at 08:14

## 2024-09-29 RX ADMIN — ACETAMINOPHEN 650 MG: 325 TABLET, FILM COATED ORAL at 16:24

## 2024-09-29 RX ADMIN — ASPIRIN 81 MG: 81 TABLET, COATED ORAL at 08:14

## 2024-09-29 RX ADMIN — INSULIN GLARGINE 18 UNITS: 100 INJECTION, SOLUTION SUBCUTANEOUS at 21:43

## 2024-09-29 RX ADMIN — METOPROLOL SUCCINATE 50 MG: 50 TABLET, EXTENDED RELEASE ORAL at 08:13

## 2024-09-29 RX ADMIN — FUROSEMIDE 40 MG: 40 TABLET ORAL at 08:14

## 2024-09-29 RX ADMIN — CEFEPIME 2000 MG: 2 INJECTION, POWDER, FOR SOLUTION INTRAVENOUS at 02:15

## 2024-09-29 RX ADMIN — OXYCODONE HYDROCHLORIDE 5 MG: 5 TABLET ORAL at 02:20

## 2024-09-29 RX ADMIN — LEVOFLOXACIN 750 MG: 500 TABLET, FILM COATED ORAL at 12:15

## 2024-09-29 RX ADMIN — OXYCODONE HYDROCHLORIDE 5 MG: 5 TABLET ORAL at 06:22

## 2024-09-29 RX ADMIN — ENOXAPARIN SODIUM 40 MG: 40 INJECTION SUBCUTANEOUS at 08:14

## 2024-09-29 NOTE — PLAN OF CARE
Problem: INFECTION - ADULT  Goal: Absence or prevention of progression during hospitalization  Description: INTERVENTIONS:  - Assess and monitor for signs and symptoms of infection  - Monitor lab/diagnostic results  - Monitor all insertion sites, i.e. indwelling lines, tubes, and drains  - Monitor endotracheal if appropriate and nasal secretions for changes in amount and color  - [place] appropriate cooling/warming therapies per order  - Administer medications as ordered  - Instruct and encourage patient and family to use good hand hygiene technique  - Identify and instruct in appropriate isolation precautions for identified infection/condition  9/29/2024 1958 by Sugey Izquierdo RN  Outcome: Progressing  9/29/2024 1958 by Sugey Izquierdo RN  Outcome: Progressing

## 2024-09-29 NOTE — ASSESSMENT & PLAN NOTE
Wt Readings from Last 3 Encounters:   09/29/24 95.7 kg (210 lb 15.7 oz)   09/26/24 95.7 kg (211 lb)   09/11/24 95.8 kg (211 lb 3.2 oz)

## 2024-09-29 NOTE — ASSESSMENT & PLAN NOTE
Lab Results   Component Value Date    HGBA1C 9.8 (H) 09/01/2024       Recent Labs     09/28/24  1145 09/28/24  1652 09/28/24 2041 09/29/24  0756   POCGLU 181* 155* 261* 133       Blood Sugar Average: Last 72 hrs:  (P) [id number]

## 2024-09-29 NOTE — ASSESSMENT & PLAN NOTE
Patient euvolemic on exam  Most recent echo September 2024 revealing LVEF 55%, no wall motion abnormality, nodiastolic dysfunction  Continue PTA Lasix  Wt Readings from Last 3 Encounters:   09/29/24 95.7 kg (210 lb 15.7 oz)   09/26/24 95.7 kg (211 lb)   09/11/24 95.8 kg (211 lb 3.2 oz)

## 2024-09-29 NOTE — PLAN OF CARE
Problem: INFECTION - ADULT  Goal: Absence or prevention of progression during hospitalization  Description: INTERVENTIONS:  - Assess and monitor for signs and symptoms of infection  - Monitor lab/diagnostic results  - Monitor all insertion sites, i.e. indwelling lines, tubes, and drains  - Monitor endotracheal if appropriate and nasal secretions for changes in amount and color  - [place] appropriate cooling/warming therapies per order  - Administer medications as ordered  - Instruct and encourage patient and family to use good hand hygiene technique  - Identify and instruct in appropriate isolation precautions for identified infection/condition  Outcome: Progressing

## 2024-09-29 NOTE — DISCHARGE INSTR - OTHER ORDERS
Wound Care:  -Left foot - betadine paint the incision site, dress with Adaptic, 4x4 gauze, ABD padding and chas wrap.  -Right foot - Plantar foot wound - cover with Collagen or Calcium alginate. Cover rest of foot with 4x4 gauze, ABD padding & chas wrap.    Patient must wear heel wedge shoe on the RIGHT foot & post op shoe (dispensed from hospital) on the LEFT foot until wounds are fully healed. Weight bearing as tolerated for both feet.

## 2024-09-29 NOTE — PROGRESS NOTES
Progress Note - Hospitalist   Name: Zain Gayle 59 y.o. male I MRN: [id number]  Unit/Bed#: -01 I Date of Admission: 9/26/2024   Date of Service: 9/29/2024 I Hospital Day: 1    Assessment & Plan  Diabetic foot ulcer (HCC)  Patient presents to ED from wound care center with wound dehiscence and bone visualization of left hallux amputation site.  Denies fever, chills, or pain, although patient is neuropathic.  Denies complications to right amputation site  Vital signs stable and patient afebrile, mild leukocytosis 11.54.  Lactic acid WNL.  ESR 76, CRP 14.3  X-ray left foot revealing soft tissue ulceration overlying the region of the prior great toe amputation. No evidence of osteomyelitis of the first metatarsal head. No soft tissue gas   MRI ordered. Reviewed.   Continue to monitor CBC/BMP  S/p Left foot partial 1st ray resection (Incision of bone cortex for osteomyelitis, Sesamoidectomy and Left foot abscess incision & drainage on 9/27. Per podiatry, surgical cure achieved.   Continue Levaquin x7d. Appreciate ID input  PT/OT for disposition plan.   Wound care per podiatry     Lab Results   Component Value Date    HGBA1C 9.8 (H) 09/01/2024       Recent Labs     09/28/24  1652 09/28/24  2041 09/29/24  0756 09/29/24  1131   POCGLU 155* 261* 133 144*       Blood Sugar Average: Last 72 hrs:  (P) 184.5    Type 2 diabetes mellitus with hyperglycemia (HCC)  Most recent A1c 9.8 indicating poor glycemic control  Hold PTA Trulicity  18 units Lantus nightly and SSI ordered  Monitor Accu-Cheks closely  Avoid hypoglycemia  Lab Results   Component Value Date    HGBA1C 9.8 (H) 09/01/2024       Recent Labs     09/28/24  1652 09/28/24  2041 09/29/24  0756 09/29/24  1131   POCGLU 155* 261* 133 144*       Blood Sugar Average: Last 72 hrs:  (P) 184.5    Essential hypertension  Monitor BP  CAD (coronary artery disease)  Patient with history of CAD s/p stents x 2  Patient currently without chest pain or shortness of breath  Continue  PTA statin  Resume DAPT   CHF (congestive heart failure) (HCC)  Patient euvolemic on exam  Most recent echo 2024 revealing LVEF 55%, no wall motion abnormality, nodiastolic dysfunction  Continue PTA Lasix  Wt Readings from Last 3 Encounters:   24 95.7 kg (210 lb 15.7 oz)   24 95.7 kg (211 lb)   24 95.8 kg (211 lb 3.2 oz)               VTE Pharmacologic Prophylaxis: VTE Score: 5 Moderate Risk (Score 3-4) - Pharmacological DVT Prophylaxis Ordered: enoxaparin (Lovenox).    Mobility:   Basic Mobility Inpatient Raw Score: 24  JH-HLM Goal: 8: Walk 250 feet or more  JH-HLM Achieved: 1: Laying in bed  JH-HLM Goal NOT achieved. Continue with multidisciplinary rounding and encourage appropriate mobility to improve upon JH-HLM goals.    Patient Centered Rounds: I performed bedside rounds with nursing staff today.   Discussions with Specialists or Other Care Team Provider: ID    Education and Discussions with Family / Patient: Patient declined call to .     Current Length of Stay: 1 day(s)  Current Patient Status: Inpatient   Certification Statement: The patient will continue to require additional inpatient hospital stay due to PT/OT  Discharge Plan: Anticipate discharge in 24-48 hrs to discharge location to be determined pending rehab evaluations.    Code Status: Level 1 - Full Code    Subjective   Foot pain stable      Objective     Vitals:   Temp (24hrs), Av.3 °F (36.8 °C), Min:97.9 °F (36.6 °C), Max:98.7 °F (37.1 °C)    Temp:  [97.9 °F (36.6 °C)-98.7 °F (37.1 °C)] 98.7 °F (37.1 °C)  HR:  [60-65] 65  BP: (124-150)/(75-98) 124/98  SpO2:  [97 %-99 %] 97 %  Body mass index is 29.43 kg/m².     Input and Output Summary (last 24 hours):     Intake/Output Summary (Last 24 hours) at 2024 1259  Last data filed at 2024 1132  Gross per 24 hour   Intake 2690 ml   Output 4150 ml   Net -1460 ml       Physical Exam  Vitals and nursing note reviewed.   Cardiovascular:      Rate and  Rhythm: Normal rate and regular rhythm.   Pulmonary:      Effort: Pulmonary effort is normal.      Breath sounds: Normal breath sounds.   Abdominal:      General: Bowel sounds are normal.      Palpations: Abdomen is soft.          Lines/Drains:  Lines/Drains/Airways       Active Status       None                            Lab Results: I have reviewed the following results: CBC/BMP:   .     09/29/24  0426   WBC 9.38   HGB 11.9*   HCT 36.5      SODIUM 133*   K 3.9      CO2 24   BUN 24   CREATININE 0.87   GLUC 131       Results from last 7 days   Lab Units 09/29/24  0426 09/27/24  0542 09/26/24  1155   WBC Thousand/uL 9.38   < > 11.54*   HEMOGLOBIN g/dL 11.9*   < > 14.8   HEMATOCRIT % 36.5   < > 46.0   PLATELETS Thousands/uL 257   < > 408*   SEGS PCT %  --   --  75   LYMPHO PCT %  --   --  17   MONO PCT %  --   --  5   EOS PCT %  --   --  1    < > = values in this interval not displayed.     Results from last 7 days   Lab Units 09/29/24  0426 09/28/24  0525 09/27/24  0542   SODIUM mmol/L 133*   < > 137   POTASSIUM mmol/L 3.9   < > 3.9   CHLORIDE mmol/L 102   < > 104   CO2 mmol/L 24   < > 25   BUN mg/dL 24   < > 35*   CREATININE mg/dL 0.87   < > 1.15   ANION GAP mmol/L 7   < > 8   CALCIUM mg/dL 8.6   < > 8.8   ALBUMIN g/dL  --   --  3.6   TOTAL BILIRUBIN mg/dL  --   --  0.48   ALK PHOS U/L  --   --  87   ALT U/L  --   --  11   AST U/L  --   --  10*   GLUCOSE RANDOM mg/dL 131   < > 140    < > = values in this interval not displayed.     Results from last 7 days   Lab Units 09/26/24  1155   INR  1.00     Results from last 7 days   Lab Units 09/29/24  1131 09/29/24  0756 09/28/24 2041 09/28/24  1652 09/28/24  1145 09/28/24  0805 09/27/24  2019 09/27/24  1513 09/27/24  1154 09/27/24  0728 09/26/24 2045 09/26/24  1605   POC GLUCOSE mg/dl 144* 133 261* 155* 181* 143* 182* 151* 140 163* 260* 301*         Results from last 7 days   Lab Units 09/26/24  1155   LACTIC ACID mmol/L 1.4   PROCALCITONIN ng/ml 0.07        Recent Cultures (last 7 days):   Results from last 7 days   Lab Units 09/27/24  1125 09/26/24  1200 09/26/24  1155   BLOOD CULTURE   --  No Growth at 48 hrs. No Growth at 48 hrs.   GRAM STAIN RESULT  Rare Polys  No bacteria seen  --   --    WOUND CULTURE  1+ Growth of Pseudomonas aeruginosa MDR*  1+ Growth of  --   --        Imaging Review: Reviewed radiology reports from this admission including: xray(s).  Other Studies: EKG was reviewed.     Last 24 Hours Medication List:     Current Facility-Administered Medications:     acetaminophen (TYLENOL) tablet 650 mg, Q6H PRN    aspirin (ECOTRIN LOW STRENGTH) EC tablet 81 mg, Daily    atorvastatin (LIPITOR) tablet 40 mg, Daily    clopidogrel (PLAVIX) tablet 75 mg, Daily    enoxaparin (LOVENOX) subcutaneous injection 40 mg, Q24H CHRISTIANO    furosemide (LASIX) tablet 40 mg, Daily    insulin glargine (LANTUS) subcutaneous injection 18 Units 0.18 mL, HS    insulin lispro (HumALOG/ADMELOG) 100 units/mL subcutaneous injection 1-6 Units, TID AC **AND** [CANCELED] Fingerstick Glucose (POCT), TID AC    levofloxacin (LEVAQUIN) tablet 750 mg, Q24H    metoprolol succinate (TOPROL-XL) 24 hr tablet 50 mg, Daily    ondansetron (ZOFRAN) injection 4 mg, Q6H PRN    oxyCODONE (ROXICODONE) split tablet 2.5 mg, Q4H PRN **OR** oxyCODONE (ROXICODONE) IR tablet 5 mg, Q4H PRN    Administrative Statements   Today, Patient Was Seen By: Familia Blackwood MD  I have spent a total time of 25 minutes in caring for this patient on the day of the visit/encounter including .    **Please Note: This note may have been constructed using a voice recognition system.**

## 2024-09-29 NOTE — ASSESSMENT & PLAN NOTE
Lab Results   Component Value Date    HGBA1C 9.8 (H) 09/01/2024     -POD#2 LEFT FOOT 1ST RAY RESECTION (9/27/24).  -Patient seen & examined this morning.   -Left foot surgical wound sutures intact. No dehiscenses.  -Right foot examined and submet 3 ulcer is clinically stable. All other incision sites are healing.  -Wound culture final result pending: growing Pseudo. Abx per medicine recs following final culture.  -I dressed B/L feet wounds today with dry sterile dressings. Next change for wounds can take place in q48 hours. Will place nursing order for this.  -Explained to patient that he must wear post op shoes on both feet for best healing results. Please dispense a post op shoe for his LEFT foot. Patient already has a heel wedge shoe for his RIGHT foot. WBAT on B/L feet in the post op shoes.  -No further podiatric surgical intervention during this admission. Patient will follow up with my for post op care later this week once discharged.              Recent Labs     09/28/24  1145 09/28/24  1652 09/28/24  2041 09/29/24  0756   POCGLU 181* 155* 261* 133       Blood Sugar Average: Last 72 hrs:  (P) 188.3280249394509799

## 2024-09-29 NOTE — ASSESSMENT & PLAN NOTE
Most recent A1c 9.8 indicating poor glycemic control  Hold PTA Trulicity  18 units Lantus nightly and SSI ordered  Monitor Accu-Cheks closely  Avoid hypoglycemia  Lab Results   Component Value Date    HGBA1C 9.8 (H) 09/01/2024       Recent Labs     09/28/24  1652 09/28/24  2041 09/29/24  0756 09/29/24  1131   POCGLU 155* 261* 133 144*       Blood Sugar Average: Last 72 hrs:  (P) 184.5

## 2024-09-29 NOTE — ASSESSMENT & PLAN NOTE
Patient presents to ED from wound care center with wound dehiscence and bone visualization of left hallux amputation site.  Denies fever, chills, or pain, although patient is neuropathic.  Denies complications to right amputation site  Vital signs stable and patient afebrile, mild leukocytosis 11.54.  Lactic acid WNL.  ESR 76, CRP 14.3  X-ray left foot revealing soft tissue ulceration overlying the region of the prior great toe amputation. No evidence of osteomyelitis of the first metatarsal head. No soft tissue gas   MRI ordered. Reviewed.   Continue to monitor CBC/BMP  S/p Left foot partial 1st ray resection (Incision of bone cortex for osteomyelitis, Sesamoidectomy and Left foot abscess incision & drainage on 9/27. Per podiatry, surgical cure achieved.   Continue Levaquin x7d. Appreciate ID input  PT/OT for disposition plan.   Wound care per podiatry     Lab Results   Component Value Date    HGBA1C 9.8 (H) 09/01/2024       Recent Labs     09/28/24  1652 09/28/24  2041 09/29/24  0756 09/29/24  1131   POCGLU 155* 261* 133 144*       Blood Sugar Average: Last 72 hrs:  (P) 184.5

## 2024-09-29 NOTE — PROGRESS NOTES
Progress Note - Podiatry   Name: Zain Gayle 59 y.o. male I MRN: [id number]  Unit/Bed#: -01 I Date of Admission: 9/26/2024   Date of Service: 9/29/2024 I Hospital Day: 1     Assessment & Plan  Diabetic foot ulcer (HCC)  Lab Results   Component Value Date    HGBA1C 9.8 (H) 09/01/2024     -POD#2 LEFT FOOT 1ST RAY RESECTION (9/27/24).  -Patient seen & examined this morning.   -Left foot surgical wound sutures intact. No dehiscenses.  -Right foot examined and submet 3 ulcer is clinically stable. All other incision sites are healing.  -Wound culture final result pending: growing Pseudo. Abx per medicine recs following final culture.  -I dressed B/L feet wounds today with dry sterile dressings. Next change for wounds can take place in q48 hours. Will place nursing order for this.  -Explained to patient that he must wear post op shoes on both feet for best healing results. Please dispense a post op shoe for his LEFT foot. Patient already has a heel wedge shoe for his RIGHT foot. WBAT on B/L feet in the post op shoes.  -No further podiatric surgical intervention during this admission. Patient will follow up with my for post op care later this week once discharged.              Recent Labs     09/28/24  1145 09/28/24  1652 09/28/24 2041 09/29/24  0756   POCGLU 181* 155* 261* 133       Blood Sugar Average: Last 72 hrs:  (P) 188.5615067519522739    CAD (coronary artery disease)      Essential hypertension    Type 2 diabetes mellitus with hyperglycemia (HCC)  Lab Results   Component Value Date    HGBA1C 9.8 (H) 09/01/2024       Recent Labs     09/28/24  1145 09/28/24  1652 09/28/24 2041 09/29/24  0756   POCGLU 181* 155* 261* 133       Blood Sugar Average: Last 72 hrs:  (P) 188.0334812755423663    CHF (congestive heart failure) (HCC)  Wt Readings from Last 3 Encounters:   09/29/24 95.7 kg (210 lb 15.7 oz)   09/26/24 95.7 kg (211 lb)   09/11/24 95.8 kg (211 lb 3.2 oz)                 History of Present Illness   59  y.o.male s/p left foot partial 1st ray resection (9/27/24). No acute events, no new complaints. Pain well controlled. Denies fevers, chills, CP, SOB, N/V, numbness or tingling. Patient reports no issues with urination or bowel movements. Patient states no new pedal complaints.    Objective      Temp:  [97.9 °F (36.6 °C)-98.7 °F (37.1 °C)] 98.7 °F (37.1 °C)  HR:  [60-66] 65  Resp:  [20] 20  BP: (124-150)/(67-98) 124/98  O2 Device: None (Room air)          I/O         09/27 0701 09/28 0700 09/28 0701 09/29 0700 09/29 0701 09/30 0700    P.O. 1455 3000     I.V. (mL/kg) 400      IV Piggyback 50 50     Total Intake(mL/kg) 1905 3050 (31.9)     Urine (mL/kg/hr) 3600 3900 (1.7) 200 (1.9)    Total Output 3600 3900 200    Net -1695 -850 -200           Unmeasured Urine Occurrence 2 x            Lines/Drains/Airways       Active Status       None                  Physical Exam     Vascular:   -DP pulse is palpable B/L, PT pulse is palpable B/L   -Capillary refill time is less than 3 seconds B/L  -Pedal hair growth is diminished B/L  -Temperature is warm to cool from ankle to toes B/L   -There is mild edema noted to the B/L LE     Neuro:  -Light touch and protective sensation is diminished     Ortho:  -Left hallux amputation.  -Right foot digital amputations 1-5.     Derm:  -LEFT foot hallux amputation surgical site sutures intact. No dehiscences noted. No periwound erythema/calor.    -Right foot: Plantar submet 3 ulcer Grade 2. Measures approx 2.2 x 1.5 x 0.3 cm. Does not probe deep. No periwound erythema/calor.     Lab Results: I have reviewed the following results:   Recent Labs     09/26/24  1155 09/27/24  0542 09/28/24  0525 09/29/24  0426   WBC 11.54* 10.07 10.24* 9.38   HGB 14.8 12.3 12.2 11.9*   HCT 46.0 38.5 38.9 36.5   * 320 313 257   BUN 39* 35* 28* 24   CREATININE 1.06 1.15 1.02 0.87   PTT 35*  --   --   --    INR 1.00  --   --   --    ESR 76*  --   --   --    CRP 14.3*  --   --   --      Blood Culture:     Lab Results   Component Value Date    BLOODCX No Growth at 48 hrs. 09/26/2024     Wound Culture:   Lab Results   Component Value Date    WOUNDCULT 1+ Growth of Pseudomonas aeruginosa (A) 09/27/2024       Robb Kim DPM  [facility]  [facility] Katherin Waters, [place], [place]

## 2024-09-30 PROBLEM — L03.116 CELLULITIS OF LEFT FOOT: Status: ACTIVE | Noted: 2024-09-30

## 2024-09-30 PROBLEM — M86.9 FOOT OSTEOMYELITIS, LEFT (HCC): Status: ACTIVE | Noted: 2024-09-30

## 2024-09-30 LAB
FLUAV RNA RESP QL NAA+PROBE: NEGATIVE
FLUBV RNA RESP QL NAA+PROBE: NEGATIVE
GLUCOSE SERPL-MCNC: 134 MG/DL (ref 65–140)
GLUCOSE SERPL-MCNC: 190 MG/DL (ref 65–140)
GLUCOSE SERPL-MCNC: 210 MG/DL (ref 65–140)
RSV RNA RESP QL NAA+PROBE: NEGATIVE
SARS-COV-2 RNA RESP QL NAA+PROBE: NEGATIVE

## 2024-09-30 PROCEDURE — 82948 REAGENT STRIP/BLOOD GLUCOSE: CPT

## 2024-09-30 PROCEDURE — 0241U HB NFCT DS VIR RESP RNA 4 TRGT: CPT | Performed by: INTERNAL MEDICINE

## 2024-09-30 PROCEDURE — 99233 SBSQ HOSP IP/OBS HIGH 50: CPT | Performed by: INTERNAL MEDICINE

## 2024-09-30 PROCEDURE — 99232 SBSQ HOSP IP/OBS MODERATE 35: CPT | Performed by: INTERNAL MEDICINE

## 2024-09-30 RX ADMIN — LEVOFLOXACIN 750 MG: 500 TABLET, FILM COATED ORAL at 09:48

## 2024-09-30 RX ADMIN — CLOPIDOGREL 75 MG: 75 TABLET ORAL at 08:29

## 2024-09-30 RX ADMIN — ASPIRIN 81 MG: 81 TABLET, COATED ORAL at 08:28

## 2024-09-30 RX ADMIN — ENOXAPARIN SODIUM 40 MG: 40 INJECTION SUBCUTANEOUS at 08:29

## 2024-09-30 RX ADMIN — OXYCODONE HYDROCHLORIDE 5 MG: 5 TABLET ORAL at 06:27

## 2024-09-30 RX ADMIN — INSULIN GLARGINE 18 UNITS: 100 INJECTION, SOLUTION SUBCUTANEOUS at 21:49

## 2024-09-30 RX ADMIN — INSULIN LISPRO 2 UNITS: 100 INJECTION, SOLUTION INTRAVENOUS; SUBCUTANEOUS at 12:22

## 2024-09-30 RX ADMIN — OXYCODONE HYDROCHLORIDE 5 MG: 5 TABLET ORAL at 02:33

## 2024-09-30 RX ADMIN — FUROSEMIDE 40 MG: 40 TABLET ORAL at 08:28

## 2024-09-30 RX ADMIN — ATORVASTATIN CALCIUM 40 MG: 40 TABLET, FILM COATED ORAL at 08:29

## 2024-09-30 NOTE — PROGRESS NOTES
Progress Note - Infectious Disease   Name: Zain Gayle 59 y.o. male I MRN: [id number]  Unit/Bed#: -01 I Date of Admission: 9/26/2024   Date of Service: 9/30/2024 I Hospital Day: 2     Assessment & Plan  Cellulitis of left foot  Secondary to #2.  Clinical parameters have overall improved.  Operative cultures with growth of Pseudomonas.  Admission blood cultures without growth.  Continue oral Levaquin 750 mg daily for local SSTI  Plan for total 7 days of therapy through 10/5  Drug administration and dosing reviewed with patient  Trend fever curve/vitals while admitted  Repeat CBC/chemistry for treatment response/dosing tomorrow, if admitted  Continue to monitor surgical site  Continue local wound care  Ongoing follow-up by podiatry  Additional supportive care/discharge per primary  Foot osteomyelitis, left (HCC)  Left first metatarsal head osteomyelitis secondary to left hallux amputation wound dehiscence and exposed metatarsal head.  Patient is status post resection and per discussion with podiatry this was felt to have surgical cure.  Plan for short course of antibiotic as above  Trend fever curve/vitals  Repeat CBC/chemistry tomorrow  Ongoing follow-up by podiatry  Additional supportive care/discharge per primary  Type 2 diabetes mellitus with hyperglycemia (HCC)  Patient's hemoglobin A1c is 9.8.  Likely risk factor for the above.  Patient overall determined to focus on better diabetes control and diet control.  Further glucose management per primary.    Above plan discussed in detail with the patient at bedside  Above plan discussed in detail with primary service attending who is aware of plans for ongoing antibiotics and relatively short course of therapy    ID consult service will continue to follow while admitted.    Antibiotics:  Levaquin 2  Total antibiotic 5    24 Hour events:  Yesterday and overnight notes reviewed and no acute events noted    Subjective:  Patient currently denies having any nausea,  vomiting, chest pain or shortness of breath.  Tolerating current antibiotic without issue.  We reviewed drug administration.  Patient reports plans for discharge tomorrow as he has support available at home tomorrow.  Additional questions answered.    Objective:  Vitals:  Temp:  [98 °F (36.7 °C)-100.1 °F (37.8 °C)] 98 °F (36.7 °C)  HR:  [60-68] 60  Resp:  [16-18] 16  BP: (106-126)/(57-71) 106/57  SpO2:  [97 %-99 %] 97 %  Temp (24hrs), Av.8 °F (37.1 °C), Min:98 °F (36.7 °C), Max:100.1 °F (37.8 °C)  Current: Temperature: 98 °F (36.7 °C)    Physical Exam:   General Appearance:  Alert, interactive, nontoxic, no acute distress.   Throat: Oropharynx moist without lesions.    Lungs:   Clear to auscultation bilaterally; no wheezes, rhonchi or rales; respirations unlabored on room air   Heart:  RRR; no murmur, rub or gallop noted   Abdomen:   Soft, non-tender, non-distended, positive bowel sounds.     Extremities: No clubbing, cyanosis or edema   Skin: No new rashes or lesions. No new Draining wounds noted.  Clinical images from admission reviewed earlier.  Currently surgical sites are dressed at this time.       Labs, Imaging, & Other studies:   All pertinent labs and imaging studies in PACS were personally reviewed as below.  Results from last 7 days   Lab Units 24  0525 24  0542   WBC Thousand/uL 9.38 10.24* 10.07   HEMOGLOBIN g/dL 11.9* 12.2 12.3   PLATELETS Thousands/uL 257 313 320     Results from last 7 days   Lab Units 24  04224  0525 24  0542 24  1155   POTASSIUM mmol/L 3.9   < > 3.9 4.2   CHLORIDE mmol/L 102   < > 104 102   CO2 mmol/L 24   < > 25 24   BUN mg/dL 24   < > 35* 39*   CREATININE mg/dL 0.87   < > 1.15 1.06   EGFR ml/min/1.73sq m 95   < > 70 77   CALCIUM mg/dL 8.6   < > 8.8 9.8   AST U/L  --   --  10* 12*   ALT U/L  --   --  11 14   ALK PHOS U/L  --   --  87 110*    < > = values in this interval not displayed.     Results from last 7 days   Lab  Units 09/27/24  1125 09/26/24  1200 09/26/24  1155   BLOOD CULTURE   --  No Growth at 72 hrs. No Growth at 72 hrs.   GRAM STAIN RESULT  Rare Polys  No bacteria seen  --   --    WOUND CULTURE  1+ Growth of Pseudomonas aeruginosa MDR*  1+ Growth of  --   --        Lab interpretation/comments: No leukocytosis    Imaging interpretation/comments: No new imaging    Culture data: Blood cultures remain without growth.    External notes: EKG with unremarkable QTc.

## 2024-09-30 NOTE — PROGRESS NOTES
Progress Note - Hospitalist   Name: Zain Gayle 59 y.o. male I MRN: [id number]  Unit/Bed#: -01 I Date of Admission: 9/26/2024   Date of Service: 9/30/2024 I Hospital Day: 2    Assessment & Plan  Diabetic foot ulcer (HCC)  Patient presents to ED from wound care center with wound dehiscence and bone visualization of left hallux amputation site.  Denies fever, chills, or pain, although patient is neuropathic.  Denies complications to right amputation site  Vital signs stable and patient afebrile, mild leukocytosis 11.54.  Lactic acid WNL.  ESR 76, CRP 14.3  X-ray left foot revealing soft tissue ulceration overlying the region of the prior great toe amputation. No evidence of osteomyelitis of the first metatarsal head. No soft tissue gas   MRI ordered. Reviewed.   Continue to monitor CBC/BMP  S/p Left foot partial 1st ray resection (Incision of bone cortex for osteomyelitis, Sesamoidectomy and Left foot abscess incision & drainage on 9/27. Per podiatry, surgical cure achieved.   Continue Levaquin x7d. Appreciate ID input  PT/OT for disposition plan.   Wound care per podiatry     Lab Results   Component Value Date    HGBA1C 9.8 (H) 09/01/2024       Recent Labs     09/29/24  1538 09/29/24  2044 09/30/24  0706 09/30/24  1052   POCGLU 166* 228* 134 190*       Blood Sugar Average: Last 72 hrs:  (P) 169.8571817770784145    Type 2 diabetes mellitus with hyperglycemia (HCC)  Most recent A1c 9.8 indicating poor glycemic control  Hold PTA Trulicity  18 units Lantus nightly and SSI ordered  Monitor Accu-Cheks closely  Avoid hypoglycemia  Lab Results   Component Value Date    HGBA1C 9.8 (H) 09/01/2024       Recent Labs     09/29/24  1538 09/29/24  2044 09/30/24  0706 09/30/24  1052   POCGLU 166* 228* 134 190*       Blood Sugar Average: Last 72 hrs:  (P) 169.7419466483681022    Essential hypertension  Monitor BP  CAD (coronary artery disease)  Patient with history of CAD s/p stents x 2  Patient currently without chest pain or  shortness of breath  Continue PTA statin  Resume DAPT   CHF (congestive heart failure) (HCC)  Patient euvolemic on exam  Most recent echo 2024 revealing LVEF 55%, no wall motion abnormality, nodiastolic dysfunction  Continue PTA Lasix  Wt Readings from Last 3 Encounters:   24 95.7 kg (210 lb 15.7 oz)   24 95.7 kg (211 lb)   24 95.8 kg (211 lb 3.2 oz)             Cellulitis of left foot  Antibiotics as above  Foot osteomyelitis, left (HCC)  Plan as above    VTE Pharmacologic Prophylaxis: VTE Score: 5 Moderate Risk (Score 3-4) - Pharmacological DVT Prophylaxis Ordered: enoxaparin (Lovenox).    Mobility:   Basic Mobility Inpatient Raw Score: 23  JH-HLM Goal: 7: Walk 25 feet or more  JH-HLM Achieved: 7: Walk 25 feet or more  JH-HLM Goal achieved. Continue to encourage appropriate mobility.    Patient Centered Rounds: I performed bedside rounds with nursing staff today.   Discussions with Specialists or Other Care Team Provider: ID    Education and Discussions with Family / Patient: Patient declined call to .     Current Length of Stay: 2 day(s)  Current Patient Status: Inpatient   Certification Statement: The patient will continue to require additional inpatient hospital stay due to PT/OT, safe disposition   Discharge Plan: Anticipate discharge tomorrow to discharge location to be determined pending rehab evaluations.    Code Status: Level 1 - Full Code    Subjective   Foot pain is stable  Patient reports there is no one at his home (farm) and not feeling comfortable leaving today     Objective     Vitals:   Temp (24hrs), Av.8 °F (37.1 °C), Min:98 °F (36.7 °C), Max:100.1 °F (37.8 °C)    Temp:  [98 °F (36.7 °C)-100.1 °F (37.8 °C)] 98 °F (36.7 °C)  HR:  [60-68] 60  Resp:  [16-18] 16  BP: (106-126)/(57-71) 106/57  SpO2:  [97 %-99 %] 97 %  Body mass index is 29.43 kg/m².     Input and Output Summary (last 24 hours):     Intake/Output Summary (Last 24 hours) at 2024  1100  Last data filed at 9/30/2024 1038  Gross per 24 hour   Intake 1000 ml   Output 3175 ml   Net -2175 ml       Physical Exam  Vitals and nursing note reviewed.   Cardiovascular:      Rate and Rhythm: Normal rate and regular rhythm.   Pulmonary:      Effort: Pulmonary effort is normal.      Breath sounds: Normal breath sounds.   Abdominal:      General: Bowel sounds are normal.      Palpations: Abdomen is soft.   Psychiatric:         Mood and Affect: Mood normal.         Behavior: Behavior normal.          Lines/Drains:  Lines/Drains/Airways       Active Status       None                            Lab Results: I have reviewed the following results: CBC/BMP: No new results in last 24 hours.    Results from last 7 days   Lab Units 09/29/24  0426 09/27/24  0542 09/26/24  1155   WBC Thousand/uL 9.38   < > 11.54*   HEMOGLOBIN g/dL 11.9*   < > 14.8   HEMATOCRIT % 36.5   < > 46.0   PLATELETS Thousands/uL 257   < > 408*   SEGS PCT %  --   --  75   LYMPHO PCT %  --   --  17   MONO PCT %  --   --  5   EOS PCT %  --   --  1    < > = values in this interval not displayed.     Results from last 7 days   Lab Units 09/29/24  0426 09/28/24  0525 09/27/24  0542   SODIUM mmol/L 133*   < > 137   POTASSIUM mmol/L 3.9   < > 3.9   CHLORIDE mmol/L 102   < > 104   CO2 mmol/L 24   < > 25   BUN mg/dL 24   < > 35*   CREATININE mg/dL 0.87   < > 1.15   ANION GAP mmol/L 7   < > 8   CALCIUM mg/dL 8.6   < > 8.8   ALBUMIN g/dL  --   --  3.6   TOTAL BILIRUBIN mg/dL  --   --  0.48   ALK PHOS U/L  --   --  87   ALT U/L  --   --  11   AST U/L  --   --  10*   GLUCOSE RANDOM mg/dL 131   < > 140    < > = values in this interval not displayed.     Results from last 7 days   Lab Units 09/26/24  1155   INR  1.00     Results from last 7 days   Lab Units 09/30/24  1052 09/30/24  0706 09/29/24  2044 09/29/24  1538 09/29/24  1131 09/29/24  0756 09/28/24  2041 09/28/24  1652 09/28/24  1145 09/28/24  0805 09/27/24  2019 09/27/24  1513   POC GLUCOSE mg/dl 190*  134 228* 166* 144* 133 261* 155* 181* 143* 182* 151*         Results from last 7 days   Lab Units 09/26/24  1155   LACTIC ACID mmol/L 1.4   PROCALCITONIN ng/ml 0.07       Recent Cultures (last 7 days):   Results from last 7 days   Lab Units 09/27/24  1125 09/26/24  1200 09/26/24  1155   BLOOD CULTURE   --  No Growth at 72 hrs. No Growth at 72 hrs.   GRAM STAIN RESULT  Rare Polys  No bacteria seen  --   --    WOUND CULTURE  1+ Growth of Pseudomonas aeruginosa MDR*  1+ Growth of  --   --        Imaging Review: Reviewed radiology reports from this admission including: procedure reports.  Other Studies: No additional pertinent studies reviewed.    Last 24 Hours Medication List:     Current Facility-Administered Medications:     acetaminophen (TYLENOL) tablet 650 mg, Q6H PRN    aspirin (ECOTRIN LOW STRENGTH) EC tablet 81 mg, Daily    atorvastatin (LIPITOR) tablet 40 mg, Daily    clopidogrel (PLAVIX) tablet 75 mg, Daily    enoxaparin (LOVENOX) subcutaneous injection 40 mg, Q24H CHRISTIANO    furosemide (LASIX) tablet 40 mg, Daily    insulin glargine (LANTUS) subcutaneous injection 18 Units 0.18 mL, HS    insulin lispro (HumALOG/ADMELOG) 100 units/mL subcutaneous injection 1-6 Units, TID AC **AND** [CANCELED] Fingerstick Glucose (POCT), TID AC    levofloxacin (LEVAQUIN) tablet 750 mg, Q24H    metoprolol succinate (TOPROL-XL) 24 hr tablet 50 mg, Daily    ondansetron (ZOFRAN) injection 4 mg, Q6H PRN    oxyCODONE (ROXICODONE) split tablet 2.5 mg, Q4H PRN **OR** oxyCODONE (ROXICODONE) IR tablet 5 mg, Q4H PRN    Administrative Statements   Today, Patient Was Seen By: Familia Blackwood MD  I have spent a total time of 25 minutes in caring for this patient on the day of the visit/encounter including .    **Please Note: This note may have been constructed using a voice recognition system.**

## 2024-09-30 NOTE — ASSESSMENT & PLAN NOTE
Secondary to #2.  Clinical parameters have overall improved.  Operative cultures with growth of Pseudomonas.  Admission blood cultures without growth.  Continue oral Levaquin 750 mg daily for local SSTI  Plan for total 7 days of therapy through 10/5  Drug administration and dosing reviewed with patient  Trend fever curve/vitals while admitted  Repeat CBC/chemistry for treatment response/dosing tomorrow, if admitted  Continue to monitor surgical site  Continue local wound care  Ongoing follow-up by podiatry  Additional supportive care/discharge per primary

## 2024-09-30 NOTE — UTILIZATION REVIEW
NOTIFICATION OF INPATIENT ADMISSION   AUTHORIZATION REQUEST   SERVICING FACILITY:   [place] [address]  Tax ID: [id number]  NPI: [id number] ATTENDING PROVIDER:  Attending Name and NPI#: Familia Blackwood Md [[id number]]  Address: [address] [place] [address]  Phone: [phone]     ADMISSION INFORMATION:  Place of Service: Inpatient [facility]  Place of Service Code: 21  Inpatient Admission Date/Time: 9/28/24  9:14 AM  Discharge Date/Time: No discharge date for patient encounter.  Admitting Diagnosis Code/Description:  Wound infection [T14.8XXA, L08.9]  Visit for wound check [Z51.89]     UTILIZATION REVIEW CONTACT:  Laura Hearn Utilization   Network Utilization Review Department  Phone: [phone]  Fax [phone]  Email: [email or web address]  Contact for approvals/pending authorizations, clinical reviews, and discharge.     PHYSICIAN ADVISORY SERVICES:  Medical Necessity Denial & [id number] Review  Phone: [phone]  Fax: [phone]  Email: [email or web address]     DISCHARGE SUPPORT TEAM:  For Patients Discharge Needs & Updates  Phone: [phone] opt. 2 Fax: [phone]  Email: [email or web address]

## 2024-09-30 NOTE — ASSESSMENT & PLAN NOTE
Most recent A1c 9.8 indicating poor glycemic control  Hold PTA Trulicity  18 units Lantus nightly and SSI ordered  Monitor Accu-Cheks closely  Avoid hypoglycemia  Lab Results   Component Value Date    HGBA1C 9.8 (H) 09/01/2024       Recent Labs     09/29/24  1538 09/29/24  2044 09/30/24  0706 09/30/24  1052   POCGLU 166* 228* 134 190*       Blood Sugar Average: Last 72 hrs:  (P) 169.3579285754171959

## 2024-09-30 NOTE — PLAN OF CARE
Problem: PAIN - ADULT  Goal: Verbalizes/displays adequate comfort level or baseline comfort level  Description: Interventions:  - Encourage patient to monitor pain and request assistance  - Assess pain using appropriate pain scale  - Administer analgesics based on type and severity of pain and evaluate response  - Implement non-pharmacological measures as appropriate and evaluate response  - Consider cultural and social influences on pain and pain management  - Notify physician/advanced practitioner if interventions unsuccessful or patient reports new pain  Outcome: Progressing     Problem: INFECTION - ADULT  Goal: Absence or prevention of progression during hospitalization  Description: INTERVENTIONS:  - Assess and monitor for signs and symptoms of infection  - Monitor lab/diagnostic results  - Monitor all insertion sites, i.e. indwelling lines, tubes, and drains  - Monitor endotracheal if appropriate and nasal secretions for changes in amount and color  - [employer] appropriate cooling/warming therapies per order  - Administer medications as ordered  - Instruct and encourage patient and family to use good hand hygiene technique  - Identify and instruct in appropriate isolation precautions for identified infection/condition  Outcome: Progressing  Goal: Absence of fever/infection during neutropenic period  Description: INTERVENTIONS:  - Monitor WBC    Outcome: Progressing     Problem: DISCHARGE PLANNING  Goal: Discharge to home or other facility with appropriate resources  Description: INTERVENTIONS:  - Identify barriers to discharge w/patient and caregiver  - Arrange for needed discharge resources and transportation as appropriate  - Identify discharge learning needs (meds, wound care, etc.)  - Arrange for interpretive services to assist at discharge as needed  - Refer to Case Management Department for coordinating discharge planning if the patient needs post-hospital services based on physician/advanced  practitioner order or complex needs related to functional status, cognitive ability, or social support system  Outcome: Progressing

## 2024-09-30 NOTE — ASSESSMENT & PLAN NOTE
Patient presents to ED from wound care center with wound dehiscence and bone visualization of left hallux amputation site.  Denies fever, chills, or pain, although patient is neuropathic.  Denies complications to right amputation site  Vital signs stable and patient afebrile, mild leukocytosis 11.54.  Lactic acid WNL.  ESR 76, CRP 14.3  X-ray left foot revealing soft tissue ulceration overlying the region of the prior great toe amputation. No evidence of osteomyelitis of the first metatarsal head. No soft tissue gas   MRI ordered. Reviewed.   Continue to monitor CBC/BMP  S/p Left foot partial 1st ray resection (Incision of bone cortex for osteomyelitis, Sesamoidectomy and Left foot abscess incision & drainage on 9/27. Per podiatry, surgical cure achieved.   Continue Levaquin x7d. Appreciate ID input  PT/OT for disposition plan.   Wound care per podiatry     Lab Results   Component Value Date    HGBA1C 9.8 (H) 09/01/2024       Recent Labs     09/29/24  1538 09/29/24  2044 09/30/24  0706 09/30/24  1052   POCGLU 166* 228* 134 190*       Blood Sugar Average: Last 72 hrs:  (P) 169.4821623862606322

## 2024-09-30 NOTE — ASSESSMENT & PLAN NOTE
Left first metatarsal head osteomyelitis secondary to left hallux amputation wound dehiscence and exposed metatarsal head.  Patient is status post resection and per discussion with podiatry this was felt to have surgical cure.  Plan for short course of antibiotic as above  Trend fever curve/vitals  Repeat CBC/chemistry tomorrow  Ongoing follow-up by podiatry  Additional supportive care/discharge per primary

## 2024-09-30 NOTE — CASE MANAGEMENT
Case Management Progress Note    Patient name Zain Gayle  Location /-01 MRN [id number]  : 1965 Date 2024       LOS (days): 2  Geometric Mean LOS (GMLOS) (days):   Days to GMLOS:        OBJECTIVE:        Current admission status: Inpatient  Preferred Pharmacy:   [id number] - [place] - [address]  [address]  [place] [address]  Phone: [phone] Fax: [phone]    Primary Care Provider: No primary care provider on file.    Primary Insurance: DANAE  Secondary Insurance:     PROGRESS NOTE:    Per rounding with SLIM, patient is still being treated for a diabetic foot ulcer. PT/OT to see patient today. He is anticipated to be discharged home with [facility]  in 24 hours.  sent this same update to [facility]  via AIDIN and requested a resumption of care date.  department will continue to follow patient through discharge.

## 2024-09-30 NOTE — ASSESSMENT & PLAN NOTE
Patient's hemoglobin A1c is 9.8.  Likely risk factor for the above.  Patient overall determined to focus on better diabetes control and diet control.  Further glucose management per primary.

## 2024-09-30 NOTE — OCCUPATIONAL THERAPY NOTE
Occupational Therapy Screen        Patient Name: Zain Gayle  Today's Date: 9/30/2024 09/30/24 1245   OT Last Visit   OT Visit Date 09/30/24   Note Type   Note type Screen   Additional Comments Patient reported he has been independent with basic self care tasks, and ambulation in room and bathroom. Patient is aware of WBAT restrictions to BLE's with post up shoes. No Acute OT needs identified to warrant OT evaluation. D/C OT services, patient in agreement with such.

## 2024-09-30 NOTE — PHYSICAL THERAPY NOTE
Physical Therapy Screen    Patient's Name: Zain Gayle    Admitting Diagnosis  Wound infection [T14.8XXA, L08.9]  Visit for wound check [Z51.89]    Problem List  Patient Active Problem List   Diagnosis    Acute exacerbation of CHF (congestive heart failure) (HCC)    Acute respiratory failure with hypoxia (HCC)    ODALYS (acute kidney injury) (HCC)    Aortic valve stenosis    CAD (coronary artery disease)    Diabetic neuropathy associated with type 2 diabetes mellitus (HCC)    Elevated troponin    Essential hypertension    Type 2 diabetes mellitus with hyperglycemia (HCC)    Diabetic foot infection  (HCC)    Sepsis (HCC)    Diabetic foot ulcer (HCC)    CHF (congestive heart failure) (HCC)    Cellulitis of left foot    Foot osteomyelitis, left (HCC)     Past Medical History  Past Medical History:   Diagnosis Date    Allergic     Arthritis     CHF (congestive heart failure) (HCC)     Coronary artery disease     Diabetes mellitus (HCC)     Diabetic foot ulcers (HCC)     Hypertension     Moderate aortic stenosis     MRSA exposure      Past Surgical History  Past Surgical History:   Procedure Laterality Date    CARPAL TUNNEL RELEASE      KNEE SURGERY      VA AMPUTATION METATARSAL W/TOE SINGLE Bilateral 9/6/2024    Procedure: RIGHT FOOT PARTIAL 3RD RAY RESECTION, 4TH AND 5TH DIGIT AMPUTATION, LEFT HALLUX AMPUTATION;  Surgeon: Robb Kim DPM;  Location: [facility];  Service: Podiatry    VA AMPUTATION METATARSAL W/TOE SINGLE Left 9/27/2024    Procedure: left foot first ray resection;  Surgeon: Robb Kim DPM;  Location: [facility];  Service: Podiatry    TOE AMPUTATION          09/30/24 1250   PT Last Visit   PT Visit Date 09/30/24   Note Type   Note type Screen     Chart reviewed. PT screen performed. Pt was received seated in recliner in NAD. Pt reports that he has been independently ambulating in the room with use of the walker. Pt denies weakness or loss of balance. Pt educated on WBAT on bilateral feet in  post op shoes per podiatry note on 9/29/24. Pt verbalized understanding. Pt with AMPAC score of 23, discussed with RN. Pt with no acute PT impairments at this time. Plan to discontinue PT. Please re-consult if needed.    Katelin Beth, PT, DPT

## 2024-10-01 VITALS
SYSTOLIC BLOOD PRESSURE: 127 MMHG | HEIGHT: 71 IN | HEART RATE: 68 BPM | WEIGHT: 210.98 LBS | TEMPERATURE: 97.4 F | DIASTOLIC BLOOD PRESSURE: 71 MMHG | OXYGEN SATURATION: 98 % | RESPIRATION RATE: 18 BRPM | BODY MASS INDEX: 29.54 KG/M2

## 2024-10-01 LAB
ANION GAP SERPL CALCULATED.3IONS-SCNC: 7 MMOL/L (ref 4–13)
BACTERIA BLD CULT: NORMAL
BACTERIA BLD CULT: NORMAL
BUN SERPL-MCNC: 24 MG/DL (ref 5–25)
CALCIUM SERPL-MCNC: 9.1 MG/DL (ref 8.4–10.2)
CHLORIDE SERPL-SCNC: 101 MMOL/L (ref 96–108)
CO2 SERPL-SCNC: 26 MMOL/L (ref 21–32)
CREAT SERPL-MCNC: 0.93 MG/DL (ref 0.6–1.3)
ERYTHROCYTE [DISTWIDTH] IN BLOOD BY AUTOMATED COUNT: 14.2 % (ref 11.6–15.1)
GFR SERPL CREATININE-BSD FRML MDRD: 90 ML/MIN/1.73SQ M
GLUCOSE SERPL-MCNC: 149 MG/DL (ref 65–140)
GLUCOSE SERPL-MCNC: 168 MG/DL (ref 65–140)
GLUCOSE SERPL-MCNC: 272 MG/DL (ref 65–140)
HCT VFR BLD AUTO: 39.5 % (ref 36.5–49.3)
HGB BLD-MCNC: 12.5 G/DL (ref 12–17)
MCH RBC QN AUTO: 26.4 PG (ref 26.8–34.3)
MCHC RBC AUTO-ENTMCNC: 31.6 G/DL (ref 31.4–37.4)
MCV RBC AUTO: 84 FL (ref 82–98)
PLATELET # BLD AUTO: 272 THOUSANDS/UL (ref 149–390)
PMV BLD AUTO: 10.4 FL (ref 8.9–12.7)
POTASSIUM SERPL-SCNC: 4.3 MMOL/L (ref 3.5–5.3)
RBC # BLD AUTO: 4.73 MILLION/UL (ref 3.88–5.62)
SODIUM SERPL-SCNC: 134 MMOL/L (ref 135–147)
WBC # BLD AUTO: 9.72 THOUSAND/UL (ref 4.31–10.16)

## 2024-10-01 PROCEDURE — 85027 COMPLETE CBC AUTOMATED: CPT | Performed by: INTERNAL MEDICINE

## 2024-10-01 PROCEDURE — 82948 REAGENT STRIP/BLOOD GLUCOSE: CPT

## 2024-10-01 PROCEDURE — 99239 HOSP IP/OBS DSCHRG MGMT >30: CPT | Performed by: STUDENT IN AN ORGANIZED HEALTH CARE EDUCATION/TRAINING PROGRAM

## 2024-10-01 PROCEDURE — 80048 BASIC METABOLIC PNL TOTAL CA: CPT | Performed by: INTERNAL MEDICINE

## 2024-10-01 RX ORDER — LEVOFLOXACIN 750 MG/1
750 TABLET, FILM COATED ORAL EVERY 24 HOURS
Qty: 4 TABLET | Refills: 0 | Status: SHIPPED | OUTPATIENT
Start: 2024-10-02 | End: 2024-10-05

## 2024-10-01 RX ORDER — OXYCODONE HYDROCHLORIDE 5 MG/1
5 TABLET ORAL EVERY 4 HOURS PRN
Qty: 25 TABLET | Refills: 0 | Status: SHIPPED | OUTPATIENT
Start: 2024-10-01 | End: 2024-10-11

## 2024-10-01 RX ADMIN — ATORVASTATIN CALCIUM 40 MG: 40 TABLET, FILM COATED ORAL at 08:14

## 2024-10-01 RX ADMIN — CLOPIDOGREL 75 MG: 75 TABLET ORAL at 08:14

## 2024-10-01 RX ADMIN — INSULIN LISPRO 1 UNITS: 100 INJECTION, SOLUTION INTRAVENOUS; SUBCUTANEOUS at 08:14

## 2024-10-01 RX ADMIN — INSULIN LISPRO 4 UNITS: 100 INJECTION, SOLUTION INTRAVENOUS; SUBCUTANEOUS at 12:30

## 2024-10-01 RX ADMIN — ASPIRIN 81 MG: 81 TABLET, COATED ORAL at 08:14

## 2024-10-01 RX ADMIN — FUROSEMIDE 40 MG: 40 TABLET ORAL at 08:14

## 2024-10-01 RX ADMIN — LEVOFLOXACIN 750 MG: 500 TABLET, FILM COATED ORAL at 10:16

## 2024-10-01 RX ADMIN — METOPROLOL SUCCINATE 50 MG: 50 TABLET, EXTENDED RELEASE ORAL at 08:14

## 2024-10-01 RX ADMIN — ENOXAPARIN SODIUM 40 MG: 40 INJECTION SUBCUTANEOUS at 08:14

## 2024-10-01 NOTE — CASE MANAGEMENT
Case Management Discharge Planning Note    Patient name Zain Gayle  Location /-01 MRN [id number]  : 1965 Date 10/1/2024       Current Admission Date: 2024  Current Admission Diagnosis:Diabetic foot ulcer (HCC)   Patient Active Problem List    Diagnosis Date Noted Date Diagnosed    Cellulitis of left foot 2024     Foot osteomyelitis, left (HCC) 2024     Diabetic foot ulcer (HCC) 2024     CHF (congestive heart failure) ([facility]) 2024     Sepsis ([facility]) 2024     Diabetic foot infection  ([facility]) 2024     Acute exacerbation of CHF (congestive heart failure) ([facility]) 10/15/2023     Acute respiratory failure with hypoxia ([facility]) 10/15/2023     ODALYS (acute kidney injury) ([facility]) 10/15/2023     Aortic valve stenosis 10/15/2023     CAD (coronary artery disease) 10/15/2023     Diabetic neuropathy associated with type 2 diabetes mellitus ([facility]) 10/15/2023     Essential hypertension 10/15/2023     Type 2 diabetes mellitus with hyperglycemia ([facility]) 10/15/2023     Elevated troponin 01/15/2020       LOS (days): 3  Geometric Mean LOS (GMLOS) (days):   Days to GMLOS:     OBJECTIVE:  Risk of Unplanned Readmission Score: 13.9         Current admission status: Inpatient   Preferred Pharmacy:   [id number] - [place] [place] - [address]  [address]  [facility] [address]  Phone: [phone] Fax: [phone]    Primary Care Provider: No primary care provider on file.    Primary Insurance: AMBETTER  Secondary Insurance:     DISCHARGE DETAILS:    Discharge planning discussed with:: Patient  Freedom of Choice: Yes  Comments - Freedom of Choice: CM spoke with patient via bedside phone regarding his discharge plan. KARLA reported that [facility] is able to resume care at the time of discharge, and they will see him on 10/3 for resumption of care. Patient appreciated the update. He also reported that he will need a ride home and confirmed that  the address and phone number on his face sheet are correct. Patient prefers a text from [employer] once transportation is booked. Mike andradeiver reviewed, and he reported understanding of this service. Verbal consent given to ride. Patient denied any additional questions or concerns at this time.  CM contacted family/caregiver?: No- see comments (Patient declined.)  Were Treatment Team discharge recommendations reviewed with patient/caregiver?: Yes  Did patient/caregiver verbalize understanding of patient care needs?: Yes  Were patient/caregiver advised of the risks associated with not following Treatment Team discharge recommendations?: Yes    Requested Home Health Care         Is the patient interested in HHC at discharge?: Yes  Home Health Discipline requested:: Nursing  [facility] Reason (only applicable if external [facility] name selected): Patient has established relationship with provider  Home Health Follow-Up Provider:: PCP  Home Health Services Needed:: Evaluate Functional Status and Safety, Gait/ADL Training, Heart Failure Management, Strengthening/Theraputic Exercises to Improve Function, Wound/Ostomy Care  Homebound Criteria Met:: Requires the Assistance of Another Person for Safe Ambulation or to Leave the Home, Uses an Assist Device (i.e. cane, walker, etc)  Supporting Clincal Findings:: Fatigues Easliy in Short Distances, Limited Endurance    DME Referral Provided  Referral made for DME?: No    Other Referral/Resources/Interventions Provided:  Interventions: HHC, Transportation  Referral Comments: CM confirmed in [facility] on Thursday, 10/3 if he were to discharge home today. Mike waiver placed in medical records.    Would you like to participate in our [facility] service program?  : No - Declined    Treatment Team Recommendation: Home with Home Health Care  Discharge Destination Plan:: Home with Home Health  Care  Transport at Discharge : Other (Comment)

## 2024-10-01 NOTE — CASE MANAGEMENT
Case Management Discharge Planning Note    Patient name Zain Gayle  Location /-01 MRN [id number]  : 1965 Date 10/1/2024       Current Admission Date: 2024  Current Admission Diagnosis:Diabetic foot ulcer (HCC)   Patient Active Problem List    Diagnosis Date Noted Date Diagnosed    Cellulitis of left foot 2024     Foot osteomyelitis, left (HCC) 2024     Diabetic foot ulcer (HCC) 2024     CHF (congestive heart failure) ([facility]) 2024     Sepsis ([facility]) 2024     Diabetic foot infection  ([facility]) 2024     Acute exacerbation of CHF (congestive heart failure) ([facility]) 10/15/2023     Acute respiratory failure with hypoxia ([facility]) 10/15/2023     ODALYS (acute kidney injury) ([facility]) 10/15/2023     Aortic valve stenosis 10/15/2023     CAD (coronary artery disease) 10/15/2023     Diabetic neuropathy associated with type 2 diabetes mellitus ([facility]) 10/15/2023     Essential hypertension 10/15/2023     Type 2 diabetes mellitus with hyperglycemia ([facility]) 10/15/2023     Elevated troponin 01/15/2020       LOS (days): 3  Geometric Mean LOS (GMLOS) (days):   Days to GMLOS:     OBJECTIVE:  Risk of Unplanned Readmission Score: 13.9         Current admission status: Inpatient   Preferred Pharmacy:   [id number] [place] [place] - [address]  [address]  [facility] [address]  Phone: [phone] Fax: [phone]    Primary Care Provider: No primary care provider on file.    Primary Insurance: AMBETTER  Secondary Insurance:     DISCHARGE DETAILS:    Transport at Discharge : Free Local Transportation  Number/Name of Dispatcher: [employer]  Transported by (Company and Unit #): Mike  ETA of Transport (Date): 10/01/24  ETA of Transport (Time): 1200

## 2024-10-01 NOTE — NURSING NOTE
Pt aox4 on ra denies pain. BLE dressings completed. IV removed, AVS reviewed and questions answered, monitors disconnected, personal items collected, wound dressings provided. Pt dc by wheelchair to [employer] for home.

## 2024-10-02 PROCEDURE — 88305 TISSUE EXAM BY PATHOLOGIST: CPT | Performed by: PATHOLOGY

## 2024-10-02 PROCEDURE — 88311 DECALCIFY TISSUE: CPT | Performed by: PATHOLOGY

## 2024-10-03 NOTE — ASSESSMENT & PLAN NOTE
Patient with history of CAD s/p stents x 2  Patient currently without chest pain or shortness of breath  Continue statin  Continue home aspirin/plavix

## 2024-10-03 NOTE — ASSESSMENT & PLAN NOTE
Most recent A1c 9.8 indicating poor glycemic control  Resume homeTrulicity  Resume home meds on discharge    Recent Labs     09/30/24  2056 10/01/24  0741 10/01/24  1037   POCGLU 210* 168* 272*         Blood Sugar Average: Last 72 hrs:  (P) 194.8

## 2024-10-03 NOTE — ASSESSMENT & PLAN NOTE
Patient euvolemic on exam  Most recent echo September 2024 revealing LVEF 55%, no wall motion abnormality, nodiastolic dysfunction  Continue home Lasix  Wt Readings from Last 3 Encounters:   09/29/24 95.7 kg (210 lb 15.7 oz)   09/26/24 95.7 kg (211 lb)   09/11/24 95.8 kg (211 lb 3.2 oz)

## 2024-10-03 NOTE — ASSESSMENT & PLAN NOTE
Patient presents to ED from wound care center with wound dehiscence and bone visualization of left hallux amputation site.  Denies fever, chills, or pain, although patient is neuropathic.  Denies complications to right amputation site  X-ray left foot revealing soft tissue ulceration overlying the region of the prior great toe amputation. No evidence of osteomyelitis of the first metatarsal head. No soft tissue gas   MRI ordered. Reviewed.   S/p Left foot partial 1st ray resection (Incision of bone cortex for osteomyelitis, Sesamoidectomy and Left foot abscess incision & drainage on 9/27. Per podiatry, surgical cure achieved.   Continue Levaquin x 7 d. Appreciate ID input  Stable for discharge, follow up with podiatry as an outpatient    Lab Results   Component Value Date    HGBA1C 9.8 (H) 09/01/2024       Recent Labs     09/30/24 2056 10/01/24  0741 10/01/24  1037   POCGLU 210* 168* 272*         Blood Sugar Average: Last 72 hrs:  (P) 194.8

## 2024-10-06 DIAGNOSIS — I10 ESSENTIAL HYPERTENSION: ICD-10-CM

## 2024-10-07 PROBLEM — A41.9 SEPSIS (HCC): Status: RESOLVED | Noted: 2024-09-07 | Resolved: 2024-10-07

## 2024-10-07 NOTE — DISCHARGE SUMMARY
"Discharge Summary - Hospitalist   Name: Zain Gayle 59 y.o. male I MRN: [id number]  Unit/Bed#: -01 I Date of Admission: 9/26/2024   Date of Service: 10/7/2024 I Hospital Day: 3     Assessment & Plan  Diabetic foot ulcer (HCC)  Patient presents to ED from wound care center with wound dehiscence and bone visualization of left hallux amputation site.  Denies fever, chills, or pain, although patient is neuropathic.  Denies complications to right amputation site  X-ray left foot revealing soft tissue ulceration overlying the region of the prior great toe amputation. No evidence of osteomyelitis of the first metatarsal head. No soft tissue gas   MRI ordered. Reviewed.   S/p Left foot partial 1st ray resection (Incision of bone cortex for osteomyelitis, Sesamoidectomy and Left foot abscess incision & drainage on 9/27. Per podiatry, surgical cure achieved.   Continue Levaquin x 7 d. Appreciate ID input  Stable for discharge, follow up with podiatry as an outpatient    Lab Results   Component Value Date    HGBA1C 9.8 (H) 09/01/2024       No results for input(s): \"POCGLU\" in the last 72 hours.    Blood Sugar Average: Last 72 hrs:      Type 2 diabetes mellitus with hyperglycemia ([facility])  Most recent A1c 9.8 indicating poor glycemic control  Resume homeTrulicity  Resume home meds on discharge    No results for input(s): \"POCGLU\" in the last 72 hours.    Blood Sugar Average: Last 72 hrs:      Essential hypertension  Adequately controlled on home regimen for discharge   CAD (coronary artery disease)  Patient with history of CAD s/p stents x 2  Patient currently without chest pain or shortness of breath  Continue statin  Continue home aspirin/plavix   CHF (congestive heart failure) ([facility])  Patient euvolemic on exam  Most recent echo September 2024 revealing LVEF 55%, no wall motion abnormality, nodiastolic dysfunction  Continue home Lasix  Wt Readings from Last 3 Encounters:   09/29/24 95.7 kg (210 lb 15.7 oz)   09/26/24 95.7 " kg (211 lb)   09/11/24 95.8 kg (211 lb 3.2 oz)       Cellulitis of left foot  Management as above  Foot osteomyelitis, left (HCC)  Plan as above     Medical Problems       Resolved Problems  Date Reviewed: 9/27/2024   None       Discharging Physician / Practitioner: Amauri Watters MD  PCP: No primary care provider on file.  Admission Date:   Admission Orders (From admission, onward)       Ordered        09/28/24 0914  INPATIENT ADMISSION  Once            09/26/24 1328  Place in Observation  Once                          Discharge Date: 10/1/24    Consultations During Hospital Stay:  IP CONSULT TO INFECTIOUS DISEASES      Procedures Performed:   imaging    Significant Findings / Test Results:   Principal Problem:    Diabetic foot ulcer (HCC)  Active Problems:    CAD (coronary artery disease)    Essential hypertension    Type 2 diabetes mellitus with hyperglycemia (HCC)    CHF (congestive heart failure) (HCC)    Cellulitis of left foot    Foot osteomyelitis, left (HCC)  Resolved Problems:    * No resolved hospital problems. *        Incidental Findings:   See above    Test Results Pending at Discharge (will require follow up):   na     Outpatient Tests Requested:  Follow up with PCP and podiatry    Complications:  see above    Reason for Admission: infected diabetic foot ulcer    Hospital Course:   Zain Gayle is a 59 y.o. male patient who originally presented to the hospital on 9/26/2024 due to infected diabetic foot ulcer. Status post I&D by podiatry for noted abscess on imaging. Infectious disease was also consulted and patient received IV antibiotics with switch to PO for completion of total of 7 days. At this time patient is stable for discharge.       Condition at Discharge: good    Discharge Day Visit / Exam:   * Please refer to separate progress note for these details *    Discussion with Family: Patient declined call to .     Discharge instructions/Information to patient and family:   See  after visit summary for information provided to patient and family.      Provisions for Follow-Up Care:  See after visit summary for information related to follow-up care and any pertinent home health orders.      Mobility at time of Discharge:   Basic Mobility Inpatient Raw Score: 23  JH-HLM Goal: 7: Walk 25 feet or more  JH-HLM Achieved: 1: Laying in bed  HLM Goal achieved. Continue to encourage appropriate mobility.     Disposition:   Home    Planned Readmission: none    Discharge Medications:  See after visit summary for reconciled discharge medications provided to patient and/or family.      Administrative Statements   Discharge Statement:  I have spent a total time of 30+ minutes in caring for this patient on the day of the visit/encounter. >30 minutes of time was spent on: Counseling / Coordination of care, Documenting in the medical record, and Reviewing / ordering tests, medicine, procedures  .    **Please Note: This note may have been constructed using a voice recognition system**

## 2024-10-08 RX ORDER — LISINOPRIL 5 MG/1
5 TABLET ORAL DAILY
Qty: 100 TABLET | Refills: 3 | Status: SHIPPED | OUTPATIENT
Start: 2024-10-08

## 2024-10-16 ENCOUNTER — TELEPHONE (OUTPATIENT)
Dept: CARDIAC SURGERY | Facility: CLINIC | Age: 59
End: 2024-10-16

## 2024-10-16 NOTE — TELEPHONE ENCOUNTER
Spoke with patient and discussed AS findings on echo (findings identified via Egnite). I asked patient if he was following with [facility] team and he [facility]. He currently has appointments with podiatry scheduled to address recent surgery. Informed him I will call him tomorrow when he has a better idea of his schedule and offer him an appt in [facility].

## 2024-10-17 ENCOUNTER — TELEPHONE (OUTPATIENT)
Dept: CARDIAC SURGERY | Facility: CLINIC | Age: 59
End: 2024-10-17

## 2024-10-23 ENCOUNTER — TELEPHONE (OUTPATIENT)
Dept: CARDIAC SURGERY | Facility: CLINIC | Age: 59
End: 2024-10-23

## 2024-10-24 ENCOUNTER — TELEPHONE (OUTPATIENT)
Dept: CARDIAC SURGERY | Facility: CLINIC | Age: 59
End: 2024-10-24

## 2024-10-24 NOTE — TELEPHONE ENCOUNTER
Spoke with patient and offered an appointment in [facility]. Patient states he prefers to see Dr. Heredia. Informed him Dr. Heredia is currently away and I will call him back in two weeks with appointment availability

## (undated) DEVICE — GAUZE SPONGES,16 PLY: Brand: CURITY

## (undated) DEVICE — TUBING SUCTION 5MM X 12 FT

## (undated) DEVICE — SCD SEQUENTIAL COMPRESSION COMFORT SLEEVE MEDIUM KNEE LENGTH: Brand: KENDALL SCD

## (undated) DEVICE — CUFF TOURNIQUET 18 X 4 IN QUICK CONNECT DISP 1 BLADDER

## (undated) DEVICE — ABDOMINAL PAD: Brand: DERMACEA

## (undated) DEVICE — INTENDED FOR TISSUE SEPARATION, AND OTHER PROCEDURES THAT REQUIRE A SHARP SURGICAL BLADE TO PUNCTURE OR CUT.: Brand: BARD-PARKER ® CARBON RIB-BACK BLADES

## (undated) DEVICE — PAD GROUNDING DUAL ADULT

## (undated) DEVICE — GAUZE SPONGES,USP TYPE VII GAUZE, 12 PLY: Brand: CURITY

## (undated) DEVICE — MEDI-VAC YANKAUER SUCTION HANDLE W/STRAIGHT TIP & CONTROL VENT: Brand: CARDINAL HEALTH

## (undated) DEVICE — SUT VICRYL 2-0 SH 27 IN UNDYED J417H

## (undated) DEVICE — CURITY IDOFORM PACKING STRIP: Brand: CURITY

## (undated) DEVICE — PAD CAST 6 IN COTTON NON STERILE

## (undated) DEVICE — LIGHT HANDLE COVER SLEEVE DISP BLUE STELLAR

## (undated) DEVICE — CURITY NON-ADHERENT STRIPS: Brand: CURITY

## (undated) DEVICE — 4-PORT MANIFOLD: Brand: NEPTUNE 2

## (undated) DEVICE — ACE WRAP 6 IN UNSTERILE

## (undated) DEVICE — GLOVE SRG BIOGEL 7.5

## (undated) DEVICE — DRAPE EQUIPMENT RF WAND

## (undated) DEVICE — GLOVE INDICATOR PI UNDERGLOVE SZ 7.5 BLUE

## (undated) DEVICE — WET SKIN PREP TRAY: Brand: MEDLINE INDUSTRIES, INC.

## (undated) DEVICE — SUT ETHILON 3-0 PS-1 18 IN 1663H

## (undated) DEVICE — KERLIX BANDAGE ROLL: Brand: KERLIX

## (undated) DEVICE — STOCKINETTE REGULAR

## (undated) DEVICE — BETHLEHEM UNIVERSAL  MIONR EXT: Brand: CARDINAL HEALTH